# Patient Record
Sex: FEMALE | Race: WHITE | Employment: FULL TIME | URBAN - METROPOLITAN AREA
[De-identification: names, ages, dates, MRNs, and addresses within clinical notes are randomized per-mention and may not be internally consistent; named-entity substitution may affect disease eponyms.]

---

## 2017-01-10 ENCOUNTER — HOSPITAL ENCOUNTER (OUTPATIENT)
Dept: MRI IMAGING | Facility: HOSPITAL | Age: 47
Discharge: HOME | End: 2017-01-10
Attending: SURGERY
Payer: COMMERCIAL

## 2017-01-10 DIAGNOSIS — Z12.39 BREAST CANCER SCREENING, HIGH RISK PATIENT: ICD-10-CM

## 2017-01-10 DIAGNOSIS — Z91.89 AT HIGH RISK FOR BREAST CANCER: ICD-10-CM

## 2017-01-10 PROCEDURE — A9575 INJ GADOTERATE MEGLUMI 0.1ML: HCPCS | Performed by: SURGERY

## 2017-01-10 PROCEDURE — 77059 MRI BREAST (W+WO) W/CAD BILAT (CPT=77059/0159T): CPT

## 2017-01-10 PROCEDURE — 0159T MRI BREAST (W+WO) W/CAD BILAT (CPT=77059/0159T): CPT

## 2017-01-16 ENCOUNTER — TELEPHONE (OUTPATIENT)
Dept: SURGERY | Facility: CLINIC | Age: 47
End: 2017-01-16

## 2017-01-16 DIAGNOSIS — R92.8 ABNORMAL MRI, BREAST: Primary | ICD-10-CM

## 2017-01-20 ENCOUNTER — TELEPHONE (OUTPATIENT)
Dept: FAMILY MEDICINE CLINIC | Facility: CLINIC | Age: 47
End: 2017-01-20

## 2017-01-20 ENCOUNTER — TELEPHONE (OUTPATIENT)
Dept: SURGERY | Facility: CLINIC | Age: 47
End: 2017-01-20

## 2017-01-20 NOTE — TELEPHONE ENCOUNTER
Pt states that she gets shortness of breath when putting on her shoes. Per pt she has had gained weight and feels full. Pt states also at times it feels like she is being choked. Transferred call to [employer].

## 2017-01-20 NOTE — TELEPHONE ENCOUNTER
Pt states that she gained about 20 lbs within the last 2 weeks. She states that she has been eating better too. Denies swelling to extremities, constipation. She says her throat feels enlarged. She does get sob when putting her shoes on.  Pt declined SD ravinder

## 2017-01-23 ENCOUNTER — TELEPHONE (OUTPATIENT)
Dept: SURGERY | Facility: CLINIC | Age: 47
End: 2017-01-23

## 2017-01-23 ENCOUNTER — HOSPITAL ENCOUNTER (OUTPATIENT)
Dept: ULTRASOUND IMAGING | Facility: HOSPITAL | Age: 47
Discharge: HOME | End: 2017-01-23
Attending: SURGERY
Payer: COMMERCIAL

## 2017-01-23 DIAGNOSIS — R92.8 ABNORMAL MAMMOGRAM: ICD-10-CM

## 2017-01-23 PROCEDURE — 76642 ULTRASOUND BREAST LIMITED: CPT

## 2017-01-23 NOTE — TELEPHONE ENCOUNTER
Third attempt to reach pt.  [email or web address]    Need to notify pt of the following from Dr. Annalise Hussein: Please let the patient know that the MRI overall looks good but there is one area on the RIght that is hard to see so we need an US of the area to clarify moving for

## 2017-01-23 NOTE — TELEPHONE ENCOUNTER
Name & date of birth verified. Pt notified that MRI \"looks good,\" but area on the right \"hard to see. \" Pt states that she is in fact on the way to have us done at time of this call.  Pt verbalized understanding, and denies questions/concerns at this mauricio

## 2017-01-23 NOTE — TELEPHONE ENCOUNTER
Name & date of birth verified. Pt notified of benign ultrasound results, and plan of care is to return for follow-up w/ Dr. Castillo Rahman for clinical exam before next imaging is due in July 2017.  Pt verbalized understanding, and denies questions/concerns at this

## 2017-01-25 ENCOUNTER — OFFICE VISIT (OUTPATIENT)
Dept: FAMILY MEDICINE CLINIC | Facility: CLINIC | Age: 47
End: 2017-01-25

## 2017-01-25 VITALS
WEIGHT: 241 LBS | HEIGHT: 66.5 IN | BODY MASS INDEX: 38.28 KG/M2 | SYSTOLIC BLOOD PRESSURE: 129 MMHG | HEART RATE: 88 BPM | DIASTOLIC BLOOD PRESSURE: 81 MMHG | TEMPERATURE: 98 F

## 2017-01-25 DIAGNOSIS — F41.9 ANXIETY AND DEPRESSION: ICD-10-CM

## 2017-01-25 DIAGNOSIS — I73.00 RAYNAUD'S PHENOMENON WITHOUT GANGRENE: ICD-10-CM

## 2017-01-25 DIAGNOSIS — Z80.3 FH: BREAST CANCER: ICD-10-CM

## 2017-01-25 DIAGNOSIS — F32.A ANXIETY AND DEPRESSION: ICD-10-CM

## 2017-01-25 DIAGNOSIS — Z01.419 ENCOUNTER FOR GYNECOLOGICAL EXAMINATION: ICD-10-CM

## 2017-01-25 DIAGNOSIS — Z11.3 SCREENING FOR STD (SEXUALLY TRANSMITTED DISEASE): ICD-10-CM

## 2017-01-25 DIAGNOSIS — Z00.00 WELL ADULT EXAM: Primary | ICD-10-CM

## 2017-01-25 DIAGNOSIS — R42 VERTIGO: ICD-10-CM

## 2017-01-25 DIAGNOSIS — E66.9 OBESITY (BMI 30-39.9): ICD-10-CM

## 2017-01-25 DIAGNOSIS — E07.89 THYROID FULLNESS: ICD-10-CM

## 2017-01-25 PROCEDURE — 99396 PREV VISIT EST AGE 40-64: CPT | Performed by: FAMILY MEDICINE

## 2017-01-25 RX ORDER — ESCITALOPRAM OXALATE 20 MG/1
20 TABLET ORAL DAILY
Qty: 90 TABLET | Refills: 0 | Status: SHIPPED | OUTPATIENT
Start: 2017-01-25 | End: 2017-11-21

## 2017-01-25 RX ORDER — ESCITALOPRAM OXALATE 20 MG/1
TABLET ORAL
Qty: 90 TABLET | Refills: 0 | OUTPATIENT
Start: 2017-01-25

## 2017-01-25 RX ORDER — MECLIZINE HYDROCHLORIDE 25 MG/1
25 TABLET ORAL 3 TIMES DAILY PRN
Qty: 30 TABLET | Refills: 0 | Status: SHIPPED | OUTPATIENT
Start: 2017-01-25 | End: 2018-04-07

## 2017-01-25 NOTE — PROGRESS NOTES
HPI:   Helio Miller is a 40year old female who presents for a complete physical exam. Symptoms: periods are regular.    Vertigo back      Wt Readings from Last 6 Encounters:  01/25/17 : 241 lb (109.317 kg)  06/29/16 : 234 lb (106.142 kg)  11/07/15 : 221 l Smokeless Status: Never Used                        Alcohol Use: Yes                Comment: socially    Occ: . : n. Children: 2 daughters.       Immunization History   Administered Date(s) Administered   • Influenza Vaccine, No Preserv, 3YR + 11/10/ PLAN:   Tho Kent is a 40year old female who presents for a complete physical exam.  Pap and pelvic done. Self breast exam reviewed. Health maintenance, will check fasting Lipids, CMP, TSH and CBC.  Pt info handouts given for: exercise, low fat diet an

## 2017-01-25 NOTE — TELEPHONE ENCOUNTER
Pt was given script on 12/7/17 #90.  Pt will see  today  Refill Protocol Appointment Criteria  · Appointment scheduled in the past 6 months or in the next 3 months  Recent Visits       Provider Department Primary Dx    4 months ago Mayra Olsen MD E

## 2017-01-25 NOTE — PROGRESS NOTES
HPI:   Kerry Esquivel is a 40year old female who presents for a complete physical exam. Symptoms: {MPN GYNE ROS:29950}.      Wt Readings from Last 6 Encounters:  01/25/17 : 241 lb (109.317 kg)  06/29/16 : 234 lb (106.142 kg)  11/07/15 : 221 lb (100.245 kg) Never Used                        Alcohol Use: Yes                Comment: socially    Occ: ***. : ***. Children: ***.       Immunization History   Administered Date(s) Administered   • Influenza Vaccine, No Preserv, 3YR + 11/10/2014       Annual Phy is a 40year old female who presents for a complete physical exam. *** Pap and pelvic done. Self breast exam reviewed. Health maintenance, will check fasting Lipids, CMP, TSH and CBC.  Pt info handouts given for: exercise, low fat diet and breast self-exam.

## 2017-01-26 LAB
C TRACH DNA SPEC QL NAA+PROBE: NEGATIVE
N GONORRHOEA DNA SPEC QL NAA+PROBE: NEGATIVE

## 2017-01-27 LAB — LAST PAP RESULT: NORMAL

## 2017-01-28 ENCOUNTER — LAB ENCOUNTER (OUTPATIENT)
Dept: LAB | Age: 47
End: 2017-01-28
Attending: FAMILY MEDICINE
Payer: COMMERCIAL

## 2017-01-28 ENCOUNTER — HOSPITAL ENCOUNTER (OUTPATIENT)
Dept: ULTRASOUND IMAGING | Age: 47
Discharge: HOME | End: 2017-01-28
Attending: FAMILY MEDICINE
Payer: COMMERCIAL

## 2017-01-28 DIAGNOSIS — E07.89 THYROID FULLNESS: ICD-10-CM

## 2017-01-28 DIAGNOSIS — I73.00 RAYNAUD'S PHENOMENON WITHOUT GANGRENE: ICD-10-CM

## 2017-01-28 DIAGNOSIS — E66.9 OBESITY (BMI 30-39.9): ICD-10-CM

## 2017-01-28 DIAGNOSIS — Z00.00 WELL ADULT EXAM: ICD-10-CM

## 2017-01-28 LAB
ALT SERPL-CCNC: 16 U/L (ref 14–54)
ANION GAP SERPL CALC-SCNC: 7 MMOL/L (ref 0–18)
AST SERPL-CCNC: 15 U/L (ref 15–41)
BASOPHILS # BLD: 0.1 K/UL (ref 0–0.2)
BASOPHILS NFR BLD: 1 %
BUN SERPL-MCNC: 12 MG/DL (ref 8–20)
BUN/CREAT SERPL: 19.4 (ref 10–20)
CALCIUM SERPL-MCNC: 8.8 MG/DL (ref 8.5–10.5)
CHLORIDE SERPL-SCNC: 108 MMOL/L (ref 95–110)
CHOLEST SERPL-MCNC: 182 MG/DL (ref 110–200)
CO2 SERPL-SCNC: 24 MMOL/L (ref 22–32)
CREAT SERPL-MCNC: 0.62 MG/DL (ref 0.5–1.5)
EOSINOPHIL # BLD: 0.2 K/UL (ref 0–0.7)
EOSINOPHIL NFR BLD: 2 %
ERYTHROCYTE [DISTWIDTH] IN BLOOD BY AUTOMATED COUNT: 14.1 % (ref 11–15)
GLUCOSE SERPL-MCNC: 97 MG/DL (ref 70–99)
HCT VFR BLD AUTO: 39.4 % (ref 35–48)
HDLC SERPL-MCNC: 45 MG/DL
HGB BLD-MCNC: 13.2 G/DL (ref 12–16)
LDLC SERPL CALC-MCNC: 92 MG/DL (ref 0–99)
LYMPHOCYTES # BLD: 1.7 K/UL (ref 1–4)
LYMPHOCYTES NFR BLD: 19 %
MCH RBC QN AUTO: 30 PG (ref 27–32)
MCHC RBC AUTO-ENTMCNC: 33.5 G/DL (ref 32–37)
MCV RBC AUTO: 89.4 FL (ref 80–100)
MONOCYTES # BLD: 0.6 K/UL (ref 0–1)
MONOCYTES NFR BLD: 6 %
NEUTROPHILS # BLD AUTO: 6.4 K/UL (ref 1.8–7.7)
NEUTROPHILS NFR BLD: 71 %
NONHDLC SERPL-MCNC: 137 MG/DL
OSMOLALITY UR CALC.SUM OF ELEC: 288 MOSM/KG (ref 275–295)
PLATELET # BLD AUTO: 306 K/UL (ref 140–400)
PMV BLD AUTO: 7.9 FL (ref 7.4–10.3)
POTASSIUM SERPL-SCNC: 4.1 MMOL/L (ref 3.3–5.1)
RBC # BLD AUTO: 4.41 M/UL (ref 3.7–5.4)
SODIUM SERPL-SCNC: 139 MMOL/L (ref 136–144)
TRIGL SERPL-MCNC: 226 MG/DL (ref 1–149)
TSH SERPL-ACNC: 1.05 UIU/ML (ref 0.34–5.6)
VIT B12 SERPL-MCNC: 229 PG/ML (ref 181–914)
WBC # BLD AUTO: 8.9 K/UL (ref 4–11)

## 2017-01-28 PROCEDURE — 86038 ANTINUCLEAR ANTIBODIES: CPT

## 2017-01-28 PROCEDURE — 80048 BASIC METABOLIC PNL TOTAL CA: CPT

## 2017-01-28 PROCEDURE — 83036 HEMOGLOBIN GLYCOSYLATED A1C: CPT

## 2017-01-28 PROCEDURE — 76536 US EXAM OF HEAD AND NECK: CPT

## 2017-01-28 PROCEDURE — 82306 VITAMIN D 25 HYDROXY: CPT

## 2017-01-28 PROCEDURE — 80061 LIPID PANEL: CPT

## 2017-01-28 PROCEDURE — 84443 ASSAY THYROID STIM HORMONE: CPT

## 2017-01-28 PROCEDURE — 82607 VITAMIN B-12: CPT

## 2017-01-28 PROCEDURE — 36415 COLL VENOUS BLD VENIPUNCTURE: CPT

## 2017-01-28 PROCEDURE — 85025 COMPLETE CBC W/AUTO DIFF WBC: CPT

## 2017-01-28 PROCEDURE — 84450 TRANSFERASE (AST) (SGOT): CPT

## 2017-01-28 PROCEDURE — 84460 ALANINE AMINO (ALT) (SGPT): CPT

## 2017-01-29 LAB — HBA1C MFR BLD: 5 % (ref 4–6)

## 2017-01-30 LAB
25(OH)D3 SERPL-MCNC: 22.5 NG/ML
HPV I/H RISK 1 DNA SPEC QL NAA+PROBE: NEGATIVE

## 2017-01-31 LAB — ANA SER QL: NEGATIVE

## 2017-02-02 PROBLEM — E78.1 HYPERTRIGLYCERIDEMIA: Status: ACTIVE | Noted: 2017-02-02

## 2017-02-02 PROBLEM — E53.8 VITAMIN B12 DEFICIENCY: Status: ACTIVE | Noted: 2017-02-02

## 2017-02-02 PROBLEM — E55.9 VITAMIN D DEFICIENCY: Status: ACTIVE | Noted: 2017-02-02

## 2017-03-18 RX ORDER — ESCITALOPRAM OXALATE 20 MG/1
TABLET ORAL
Qty: 90 TABLET | Refills: 0 | Status: SHIPPED | OUTPATIENT
Start: 2017-03-18 | End: 2017-06-24

## 2017-03-18 NOTE — TELEPHONE ENCOUNTER
Pending Prescriptions Disp Refills    ESCITALOPRAM 20 MG Oral Tab [Pharmacy Med Name: ESCITALOPRAM 20 MG TABLET] 90 tablet 0     Sig: TAKE 1 TABLET BY MOUTH ONCE DAILY.            Refill Protocol Appointment Criteria  · Appointment scheduled in the past 6

## 2017-06-04 NOTE — TELEPHONE ENCOUNTER
rx needs to be phoned in if approved.   Last refilled on 9/8/16 #30 #1RF    ·   Recent Visits       Provider Department Primary Dx    4 months ago Niurka Morfin MD [facility] Sumeet OLIVARES Well adult exam    8 months ago Niurka Morfin MD E

## 2017-06-05 RX ORDER — ALPRAZOLAM 0.25 MG/1
TABLET ORAL
Qty: 30 TABLET | Refills: 0 | OUTPATIENT
Start: 2017-06-05 | End: 2017-08-30

## 2017-06-21 ENCOUNTER — HOSPITAL ENCOUNTER (OUTPATIENT)
Facility: HOSPITAL | Age: 47
Discharge: HOME | End: 2017-06-21
Attending: SURGERY
Payer: COMMERCIAL

## 2017-06-21 ENCOUNTER — OFFICE VISIT (OUTPATIENT)
Dept: SURGERY | Facility: CLINIC | Age: 47
End: 2017-06-21

## 2017-06-21 VITALS
BODY MASS INDEX: 39.8 KG/M2 | WEIGHT: 250.63 LBS | HEART RATE: 102 BPM | DIASTOLIC BLOOD PRESSURE: 79 MMHG | SYSTOLIC BLOOD PRESSURE: 133 MMHG | HEIGHT: 66.5 IN

## 2017-06-21 DIAGNOSIS — Z12.39 BREAST CANCER SCREENING, HIGH RISK PATIENT: Primary | ICD-10-CM

## 2017-06-21 DIAGNOSIS — Z91.89 AT HIGH RISK FOR BREAST CANCER: ICD-10-CM

## 2017-06-21 PROCEDURE — 99211 OFF/OP EST MAY X REQ PHY/QHP: CPT | Performed by: SURGERY

## 2017-06-21 PROCEDURE — 99214 OFFICE O/P EST MOD 30 MIN: CPT | Performed by: SURGERY

## 2017-06-27 RX ORDER — ESCITALOPRAM OXALATE 20 MG/1
TABLET ORAL
Qty: 90 TABLET | Refills: 0 | Status: SHIPPED | OUTPATIENT
Start: 2017-06-27 | End: 2017-11-21

## 2017-06-27 NOTE — TELEPHONE ENCOUNTER
Refill Protocol Appointment Criteria: Refilled per protocol    · Appointment scheduled in the past 6 months or in the next 3 months  Recent Outpatient Visits            6 days ago Breast cancer screening, high risk patient    [facility]

## 2017-07-06 ENCOUNTER — TELEPHONE (OUTPATIENT)
Dept: FAMILY MEDICINE CLINIC | Facility: CLINIC | Age: 47
End: 2017-07-06

## 2017-07-08 NOTE — TELEPHONE ENCOUNTER
Dr. Claudia Canas, please see below and advise. Patient was seen 1/25/17 for physical. No tdap administration on file.

## 2017-07-11 NOTE — TELEPHONE ENCOUNTER
Patient verbalized understanding of AMA message. Patient will check her work schedule and call our office to schedule.

## 2017-07-29 ENCOUNTER — HOSPITAL ENCOUNTER (OUTPATIENT)
Dept: MAMMOGRAPHY | Facility: HOSPITAL | Age: 47
Discharge: HOME | End: 2017-07-29
Attending: SURGERY
Payer: COMMERCIAL

## 2017-07-29 DIAGNOSIS — Z12.39 BREAST CANCER SCREENING, HIGH RISK PATIENT: ICD-10-CM

## 2017-08-05 NOTE — PROGRESS NOTES
Breast Surgery Follow-Up    History of Present Illness:    Ms. Robert Varner is a 40year old woman who presented last in February 2015 with multiple concerns related to her Left breast. She reports that several years ago, she developed sudden onset of Lef surveillance in light of the family history of breast cancer. Presently she has no new clinical concerns today. She had a mammogram performed on June 30, 2016 which was reported as benign.   She did undergo MRI on January 10, 2017 for high risk screening prior to visit.      Allergies:       Latex                   Rash    Family History    Problem  Relation  Age of Onset    •  Heart Disorder  Father      •  Diabetes  Father      •  Hypertension  Father      •  Breast Cancer  Paternal Grandmother  37        symptoms, vomiting, dark or bloody stools, constipation, yellowing of the skin, indigestion, nausea, change in bowel habits, diarrhea, abdominal pain or vomiting blood.      Genitourinary:  The patient denies frequent urination, needing to get up at night t trachea is in the midline. Conjunctiva are clear, non-icteric. Breasts:  Her breasts are asymmetrical (Left>Right) with a cup size 40D.   Right breast[de-identified] The skin, nipple ,and areola appear normal. There is no skin dimpling with movement of the pectoralis in the UOQ of the Left breast consistent with her previous imaging findings which has been stable over time. This has been biopsied in the past with benign pathology and has been stable and/or smaller on exam than previous.  On exam today I found no clinica today.  I anticipate that we will plan for MRI in January 2018 per high risk screening protocol. However, I will call her with the results of the mammogram when they are available to discuss the optimal surveillance plan.   She has been encouraged to conta

## 2017-09-01 NOTE — TELEPHONE ENCOUNTER
Rx needs to be phoned in if approved  Last refilled on 6-5-17 #30 0RF    Refill Protocol Appointment Criteria  · Appointment scheduled in the past 6 months or in the next 3 months  Recent Outpatient Visits            2 months ago Breast cancer screening, h

## 2017-09-02 RX ORDER — ALPRAZOLAM 0.25 MG/1
TABLET ORAL
Qty: 30 TABLET | Refills: 0 | OUTPATIENT
Start: 2017-09-02 | End: 2017-11-21

## 2017-09-23 ENCOUNTER — HOSPITAL ENCOUNTER (OUTPATIENT)
Dept: MAMMOGRAPHY | Facility: HOSPITAL | Age: 47
Discharge: HOME | End: 2017-09-23
Attending: SURGERY
Payer: COMMERCIAL

## 2017-09-23 PROCEDURE — 77063 BREAST TOMOSYNTHESIS BI: CPT | Performed by: SURGERY

## 2017-09-23 PROCEDURE — 77067 SCR MAMMO BI INCL CAD: CPT | Performed by: SURGERY

## 2017-09-26 DIAGNOSIS — Z12.39 BREAST CANCER SCREENING, HIGH RISK PATIENT: ICD-10-CM

## 2017-09-26 DIAGNOSIS — Z91.89 AT HIGH RISK FOR BREAST CANCER: Primary | ICD-10-CM

## 2017-11-21 ENCOUNTER — NURSE TRIAGE (OUTPATIENT)
Dept: OTHER | Age: 47
End: 2017-11-21

## 2017-11-21 ENCOUNTER — OFFICE VISIT (OUTPATIENT)
Dept: FAMILY MEDICINE CLINIC | Facility: CLINIC | Age: 47
End: 2017-11-21

## 2017-11-21 VITALS
DIASTOLIC BLOOD PRESSURE: 75 MMHG | TEMPERATURE: 99 F | HEIGHT: 66.5 IN | HEART RATE: 81 BPM | SYSTOLIC BLOOD PRESSURE: 113 MMHG

## 2017-11-21 DIAGNOSIS — F41.9 ANXIETY AND DEPRESSION: ICD-10-CM

## 2017-11-21 DIAGNOSIS — M62.838 MUSCLE SPASMS OF NECK: ICD-10-CM

## 2017-11-21 DIAGNOSIS — K52.9 GASTROENTERITIS: Primary | ICD-10-CM

## 2017-11-21 DIAGNOSIS — F32.A ANXIETY AND DEPRESSION: ICD-10-CM

## 2017-11-21 PROCEDURE — 99212 OFFICE O/P EST SF 10 MIN: CPT | Performed by: FAMILY MEDICINE

## 2017-11-21 PROCEDURE — 99214 OFFICE O/P EST MOD 30 MIN: CPT | Performed by: FAMILY MEDICINE

## 2017-11-21 RX ORDER — DIPHENOXYLATE HYDROCHLORIDE AND ATROPINE SULFATE 2.5; .025 MG/1; MG/1
1 TABLET ORAL 4 TIMES DAILY PRN
Qty: 20 TABLET | Refills: 0 | Status: SHIPPED | OUTPATIENT
Start: 2017-11-21 | End: 2017-12-02

## 2017-11-21 RX ORDER — CYCLOBENZAPRINE HCL 10 MG
10 TABLET ORAL 3 TIMES DAILY PRN
Qty: 30 TABLET | Refills: 0 | Status: SHIPPED | OUTPATIENT
Start: 2017-11-21 | End: 2018-04-07

## 2017-11-21 RX ORDER — ALPRAZOLAM 0.25 MG/1
0.25 TABLET ORAL 3 TIMES DAILY PRN
Qty: 30 TABLET | Refills: 0 | Status: SHIPPED | OUTPATIENT
Start: 2017-11-21 | End: 2018-04-07

## 2017-11-21 RX ORDER — DIPHENOXYLATE HYDROCHLORIDE AND ATROPINE SULFATE 2.5; .025 MG/1; MG/1
2 TABLET ORAL 4 TIMES DAILY PRN
Qty: 20 TABLET | Refills: 0 | Status: SHIPPED | OUTPATIENT
Start: 2017-11-21 | End: 2017-11-21

## 2017-11-21 NOTE — PROGRESS NOTES
Anxiety and depression  Now I can control my anger. Before I would get really upset. On the ecitralopram\"    C/o neck pain left sided  Woke up with it. Exam  Neck tender left side  Good rom no restirction  Pos muscle spasm to mid thoracic.     Dress wa

## 2017-11-21 NOTE — TELEPHONE ENCOUNTER
Action Requested: Summary for Provider     []  Critical Lab, Recommendations Needed  [x] Need Additional Advice  []   FYI    [x]   Need Orders  [x] Need Medications Sent to Pharmacy  []  Other     SUMMARY: Declines Md appointment, requesting any medication

## 2017-11-21 NOTE — TELEPHONE ENCOUNTER
She definitely should be seen. May need antibiotics.  To have diarrhea that many times and that long may not just be viral.

## 2017-12-02 ENCOUNTER — HOSPITAL ENCOUNTER (OUTPATIENT)
Dept: CT IMAGING | Age: 47
Discharge: HOME | End: 2017-12-02
Attending: FAMILY MEDICINE
Payer: COMMERCIAL

## 2017-12-02 ENCOUNTER — LAB ENCOUNTER (OUTPATIENT)
Dept: LAB | Age: 47
End: 2017-12-02
Attending: FAMILY MEDICINE
Payer: COMMERCIAL

## 2017-12-02 ENCOUNTER — OFFICE VISIT (OUTPATIENT)
Dept: FAMILY MEDICINE CLINIC | Facility: CLINIC | Age: 47
End: 2017-12-02

## 2017-12-02 VITALS
HEIGHT: 66.5 IN | BODY MASS INDEX: 39.7 KG/M2 | WEIGHT: 250 LBS | TEMPERATURE: 98 F | HEART RATE: 102 BPM | SYSTOLIC BLOOD PRESSURE: 107 MMHG | DIASTOLIC BLOOD PRESSURE: 75 MMHG

## 2017-12-02 DIAGNOSIS — R10.9 ABDOMINAL PAIN, UNSPECIFIED ABDOMINAL LOCATION: ICD-10-CM

## 2017-12-02 DIAGNOSIS — R10.10 PAIN OF UPPER ABDOMEN: ICD-10-CM

## 2017-12-02 DIAGNOSIS — R10.9 ABDOMINAL CRAMPS: ICD-10-CM

## 2017-12-02 DIAGNOSIS — R10.31 RIGHT LOWER QUADRANT PAIN: ICD-10-CM

## 2017-12-02 DIAGNOSIS — R10.9 ABDOMINAL PAIN, UNSPECIFIED ABDOMINAL LOCATION: Primary | ICD-10-CM

## 2017-12-02 DIAGNOSIS — R19.7 DIARRHEA, UNSPECIFIED TYPE: ICD-10-CM

## 2017-12-02 PROCEDURE — 83690 ASSAY OF LIPASE: CPT

## 2017-12-02 PROCEDURE — 74177 CT ABD & PELVIS W/CONTRAST: CPT | Performed by: FAMILY MEDICINE

## 2017-12-02 PROCEDURE — 99212 OFFICE O/P EST SF 10 MIN: CPT | Performed by: FAMILY MEDICINE

## 2017-12-02 PROCEDURE — 80053 COMPREHEN METABOLIC PANEL: CPT

## 2017-12-02 PROCEDURE — 82565 ASSAY OF CREATININE: CPT

## 2017-12-02 PROCEDURE — 85025 COMPLETE CBC W/AUTO DIFF WBC: CPT

## 2017-12-02 PROCEDURE — 36415 COLL VENOUS BLD VENIPUNCTURE: CPT

## 2017-12-02 PROCEDURE — 99214 OFFICE O/P EST MOD 30 MIN: CPT | Performed by: FAMILY MEDICINE

## 2017-12-02 PROCEDURE — 81000 URINALYSIS NONAUTO W/SCOPE: CPT | Performed by: FAMILY MEDICINE

## 2017-12-02 NOTE — PROGRESS NOTES
HPI:    Patient ID: Concetta Salter is a 56year old female. HPI     Patient with complains of abdominal pain that she has had for the last 2 weeks. Patient states she started with diarrhea about 2 weeks ago and would have abdominal cramps.   States sympt needed for Dizziness. Disp: 30 tablet Rfl: 0     Allergies:  Latex                   Rash   PHYSICAL EXAM:   Physical Exam   Constitutional: She appears well-developed and well-nourished.    Cardiovascular: Normal rate, regular rhythm and normal heart sound in this encounter    Imaging & Referrals:  CT ABDOMEN + PELVIS(CONTRAST ONLY) ([id number])       NR#[id number]

## 2018-01-02 ENCOUNTER — NURSE TRIAGE (OUTPATIENT)
Dept: OTHER | Age: 48
End: 2018-01-02

## 2018-01-02 NOTE — TELEPHONE ENCOUNTER
Action Requested: Summary for Provider     []  Critical Lab, Recommendations Needed  [] Need Additional Advice  []   FYI    []   Need Orders  [] Need Medications Sent to Pharmacy  []  Other     SUMMARY:  Pt is requesting antibiotics and steroids to pharmac

## 2018-01-02 NOTE — TELEPHONE ENCOUNTER
Advised patient of Dr. Rekha Kitchen note. Patient verbalized understanding. Offered patient last appointment available today, patient declined and will either [facility] or urgent care.

## 2018-04-07 ENCOUNTER — OFFICE VISIT (OUTPATIENT)
Dept: FAMILY MEDICINE CLINIC | Facility: CLINIC | Age: 48
End: 2018-04-07

## 2018-04-07 VITALS
SYSTOLIC BLOOD PRESSURE: 115 MMHG | BODY MASS INDEX: 39.54 KG/M2 | TEMPERATURE: 99 F | HEART RATE: 86 BPM | HEIGHT: 66.5 IN | WEIGHT: 249 LBS | DIASTOLIC BLOOD PRESSURE: 82 MMHG

## 2018-04-07 DIAGNOSIS — Z80.3 FH: BREAST CANCER IN FIRST DEGREE RELATIVE: ICD-10-CM

## 2018-04-07 DIAGNOSIS — F41.9 ANXIETY AND DEPRESSION: ICD-10-CM

## 2018-04-07 DIAGNOSIS — E66.9 OBESITY (BMI 30-39.9): ICD-10-CM

## 2018-04-07 DIAGNOSIS — D22.5 NEVUS OF BUTTOCK: Primary | ICD-10-CM

## 2018-04-07 DIAGNOSIS — F32.A ANXIETY AND DEPRESSION: ICD-10-CM

## 2018-04-07 DIAGNOSIS — M72.2 PLANTAR FASCIITIS, BILATERAL: ICD-10-CM

## 2018-04-07 DIAGNOSIS — Z00.00 WELL ADULT EXAM: ICD-10-CM

## 2018-04-07 PROCEDURE — 99212 OFFICE O/P EST SF 10 MIN: CPT | Performed by: FAMILY MEDICINE

## 2018-04-07 PROCEDURE — 99214 OFFICE O/P EST MOD 30 MIN: CPT | Performed by: FAMILY MEDICINE

## 2018-04-07 RX ORDER — ALPRAZOLAM 0.25 MG/1
0.25 TABLET ORAL NIGHTLY PRN
Qty: 30 TABLET | Refills: 3 | Status: SHIPPED
Start: 2018-04-07 | End: 2018-10-12

## 2018-04-07 NOTE — PROGRESS NOTES
HPI:    Patient ID: Rojelio Courser is a 56year old female. HPI     Patient presents with: Follow - Up: mole  Musculoskeletal Problem: X 6 months     Patient here with complains of feeling like a bump in between her buttocks.   She states in the past she and normal heart sounds. Pulmonary/Chest: Effort normal and breath sounds normal.   Musculoskeletal: She exhibits no edema, tenderness or deformity. Skin: Rash noted. Examined into her gluteal area. He does have a scar from a prior biopsy.   No mole Encounter      ALT(SGPT) [E]      AST (SGOT) [E]      Basic Metabolic Panel (8) [E]      CBC W Differential W Platelet [E]      Lipid Panel [E]      TSH W Reflex To Free T4 [E]      Vitamin D, 25-Hydroxy [E]    Meds This Visit:  Signed Prescriptions Disp R

## 2018-04-24 ENCOUNTER — NURSE TRIAGE (OUTPATIENT)
Dept: OTHER | Age: 48
End: 2018-04-24

## 2018-04-24 NOTE — TELEPHONE ENCOUNTER
Spoke with patient and informed of AMA message. Patient is interested in trying the Sertraline. Advised of AMA directions and to call back for any questions or concerns. Patient verbalized understanding and agreement. Rx sent as directed.

## 2018-04-24 NOTE — TELEPHONE ENCOUNTER
Can try sertraline if ok with patient  Start sertraline 50mg daily, disp 60, increase to 2 tabs daily if no improvement in 2 weeks  Follow up 3 weeks

## 2018-04-24 NOTE — TELEPHONE ENCOUNTER
Action Requested: Summary for Provider     []  Critical Lab, Recommendations Needed  [x] Need Additional Advice  []   FYI    []   Need Orders  [x] Need Medications Sent to Pharmacy  []  Other     SUMMARY:,Dr. Lyndsey Shelton, patient called and asked if you would cons

## 2018-05-21 NOTE — TELEPHONE ENCOUNTER
Pt called in to schedule her f/u appt (6/2). Pt also states that she had to increase her dosage to 100MG, per directions, and is now close to being out of medication. Pt requesting refill.

## 2018-05-22 NOTE — TELEPHONE ENCOUNTER
PLEASE ADVISE ON REFILL REQUEST    Refill Protocol Appointment Criteria  · Appointment scheduled in the past 6 months or in the next 3 months  Recent Outpatient Visits            1 month ago Dayo guevara [facility], [facility], [address], [address]

## 2018-06-30 ENCOUNTER — LAB ENCOUNTER (OUTPATIENT)
Dept: LAB | Age: 48
End: 2018-06-30
Attending: FAMILY MEDICINE
Payer: COMMERCIAL

## 2018-06-30 ENCOUNTER — APPOINTMENT (OUTPATIENT)
Dept: LAB | Age: 48
End: 2018-06-30
Attending: FAMILY MEDICINE
Payer: COMMERCIAL

## 2018-06-30 DIAGNOSIS — Z00.00 WELL ADULT EXAM: ICD-10-CM

## 2018-06-30 DIAGNOSIS — R42 DIZZINESS: ICD-10-CM

## 2018-06-30 DIAGNOSIS — R53.83 FATIGUE, UNSPECIFIED TYPE: ICD-10-CM

## 2018-06-30 LAB
ALT SERPL-CCNC: 21 U/L (ref 14–54)
ANION GAP SERPL CALC-SCNC: 6 MMOL/L (ref 0–18)
AST SERPL-CCNC: 19 U/L (ref 15–41)
BASOPHILS # BLD: 0.1 K/UL (ref 0–0.2)
BASOPHILS NFR BLD: 1 %
BUN SERPL-MCNC: 9 MG/DL (ref 8–20)
BUN/CREAT SERPL: 17.3 (ref 10–20)
CALCIUM SERPL-MCNC: 8.7 MG/DL (ref 8.5–10.5)
CHLORIDE SERPL-SCNC: 107 MMOL/L (ref 95–110)
CHOLEST SERPL-MCNC: 202 MG/DL (ref 110–200)
CO2 SERPL-SCNC: 23 MMOL/L (ref 22–32)
CREAT SERPL-MCNC: 0.52 MG/DL (ref 0.5–1.5)
EOSINOPHIL # BLD: 0.2 K/UL (ref 0–0.7)
EOSINOPHIL NFR BLD: 2 %
ERYTHROCYTE [DISTWIDTH] IN BLOOD BY AUTOMATED COUNT: 14.6 % (ref 11–15)
GLUCOSE SERPL-MCNC: 89 MG/DL (ref 70–99)
HCT VFR BLD AUTO: 39.7 % (ref 35–48)
HDLC SERPL-MCNC: 45 MG/DL
HGB BLD-MCNC: 13.2 G/DL (ref 12–16)
LDLC SERPL CALC-MCNC: 113 MG/DL (ref 0–99)
LYMPHOCYTES # BLD: 2.2 K/UL (ref 1–4)
LYMPHOCYTES NFR BLD: 22 %
MCH RBC QN AUTO: 28.6 PG (ref 27–32)
MCHC RBC AUTO-ENTMCNC: 33.4 G/DL (ref 32–37)
MCV RBC AUTO: 85.5 FL (ref 80–100)
MONOCYTES # BLD: 0.7 K/UL (ref 0–1)
MONOCYTES NFR BLD: 7 %
NEUTROPHILS # BLD AUTO: 6.8 K/UL (ref 1.8–7.7)
NEUTROPHILS NFR BLD: 68 %
NONHDLC SERPL-MCNC: 157 MG/DL
OSMOLALITY UR CALC.SUM OF ELEC: 280 MOSM/KG (ref 275–295)
PLATELET # BLD AUTO: 358 K/UL (ref 140–400)
PMV BLD AUTO: 7.7 FL (ref 7.4–10.3)
POTASSIUM SERPL-SCNC: 4.3 MMOL/L (ref 3.3–5.1)
RBC # BLD AUTO: 4.64 M/UL (ref 3.7–5.4)
SODIUM SERPL-SCNC: 136 MMOL/L (ref 136–144)
TRIGL SERPL-MCNC: 221 MG/DL (ref 1–149)
TSH SERPL-ACNC: 1.01 UIU/ML (ref 0.45–5.33)
VIT B12 SERPL-MCNC: 287 PG/ML (ref 181–914)
WBC # BLD AUTO: 10 K/UL (ref 4–11)

## 2018-06-30 PROCEDURE — 93005 ELECTROCARDIOGRAM TRACING: CPT

## 2018-06-30 PROCEDURE — 80061 LIPID PANEL: CPT

## 2018-06-30 PROCEDURE — 84450 TRANSFERASE (AST) (SGOT): CPT

## 2018-06-30 PROCEDURE — 84460 ALANINE AMINO (ALT) (SGPT): CPT

## 2018-06-30 PROCEDURE — 82306 VITAMIN D 25 HYDROXY: CPT

## 2018-06-30 PROCEDURE — 84443 ASSAY THYROID STIM HORMONE: CPT

## 2018-06-30 PROCEDURE — 36415 COLL VENOUS BLD VENIPUNCTURE: CPT

## 2018-06-30 PROCEDURE — 93010 ELECTROCARDIOGRAM REPORT: CPT | Performed by: FAMILY MEDICINE

## 2018-06-30 PROCEDURE — 85025 COMPLETE CBC W/AUTO DIFF WBC: CPT

## 2018-06-30 PROCEDURE — 80048 BASIC METABOLIC PNL TOTAL CA: CPT

## 2018-06-30 PROCEDURE — 82607 VITAMIN B-12: CPT

## 2018-06-30 NOTE — PROGRESS NOTES
HPI:    Patient ID: Taylor Tello is a 56year old female. HPI   Patient presents with: Follow - Up: anxiety and antidepression   Vertigo: X 1 week     feeling mood better. Taking sertraline 100 mg daily. Has hx of dizziness.   Feels her vertigo is ba work as well as EKG done today. Follow-up in 2 weeks for complete physical.  Patient has tried Antivert in the past for her vertigo but did not work. Encourage fluid intake and slow movements  If worsening call or follow-up or go to emergency room.

## 2018-07-02 LAB — 25(OH)D3 SERPL-MCNC: 22.7 NG/ML

## 2018-07-03 ENCOUNTER — NURSE TRIAGE (OUTPATIENT)
Dept: OTHER | Age: 48
End: 2018-07-03

## 2018-07-03 RX ORDER — CYCLOBENZAPRINE HCL 10 MG
10 TABLET ORAL 3 TIMES DAILY PRN
Qty: 30 TABLET | Refills: 0 | Status: SHIPPED | OUTPATIENT
Start: 2018-07-03 | End: 2018-12-18

## 2018-07-03 NOTE — TELEPHONE ENCOUNTER
Muscle relaxer sent in. Caution sedation with the medication. If symptoms do not improve recommend follow-up.

## 2018-07-03 NOTE — TELEPHONE ENCOUNTER
Spoke with patient and informed her of Dr. Mckenzie Rivers orders and plan of care and not to operate heavy machinery after taking the new medication. Patient voiced understanding and agreed with the plan of care.

## 2018-07-03 NOTE — TELEPHONE ENCOUNTER
Action Requested: Summary for Provider     []  Critical Lab, Recommendations Needed  [] Need Additional Advice  []   FYI    []   Need Orders  [] Need Medications Sent to Pharmacy  []  Other     SUMMARY:  Pt stated that she was in to see you on 6/30

## 2018-09-01 NOTE — TELEPHONE ENCOUNTER
To reception staff, pls call pt for f/u appt.             Pending Prescriptions Disp Refills    SERTRALINE HCL 50 MG Oral Tab [Pharmacy Med Name: SERTRALINE HCL 50 MG TABLET] 180 tablet 0     Sig: TAKE ONE TABLET BY MOUTH DAILY AND INCREASE TO TWO TABLETS D

## 2018-09-05 ENCOUNTER — TELEPHONE (OUTPATIENT)
Dept: FAMILY MEDICINE CLINIC | Facility: CLINIC | Age: 48
End: 2018-09-05

## 2018-09-05 NOTE — TELEPHONE ENCOUNTER
Medication Detail      Disp Refills Start End    Sertraline HCl 100 MG Oral Tab 90 tablet 1 6/30/2018     Sig - Route:  Take 1 tablet (100 mg total) by mouth daily. - Oral    E-Prescribing Status: Receipt confirmed by pharmacy (6/30/2018 10:55 AM CDT)

## 2018-09-05 NOTE — TELEPHONE ENCOUNTER
Current Outpatient Prescriptions:   •  Sertraline HCl 100 MG Oral Tab, Take 1 tablet (100 mg total) by mouth daily. , Disp: 90 tablet, Rfl: 1  •

## 2018-09-13 ENCOUNTER — OFFICE VISIT (OUTPATIENT)
Dept: FAMILY MEDICINE CLINIC | Facility: CLINIC | Age: 48
End: 2018-09-13
Payer: COMMERCIAL

## 2018-09-13 VITALS
DIASTOLIC BLOOD PRESSURE: 74 MMHG | BODY MASS INDEX: 39.86 KG/M2 | HEART RATE: 78 BPM | HEIGHT: 66.5 IN | WEIGHT: 251 LBS | SYSTOLIC BLOOD PRESSURE: 118 MMHG | TEMPERATURE: 98 F

## 2018-09-13 DIAGNOSIS — E53.8 VITAMIN B12 DEFICIENCY: ICD-10-CM

## 2018-09-13 DIAGNOSIS — Z01.419 ENCOUNTER FOR GYNECOLOGICAL EXAMINATION: ICD-10-CM

## 2018-09-13 DIAGNOSIS — Z00.00 WELL ADULT EXAM: Primary | ICD-10-CM

## 2018-09-13 DIAGNOSIS — E55.9 VITAMIN D DEFICIENCY: ICD-10-CM

## 2018-09-13 DIAGNOSIS — F32.A ANXIETY AND DEPRESSION: ICD-10-CM

## 2018-09-13 DIAGNOSIS — Z80.3 FH: BREAST CANCER: ICD-10-CM

## 2018-09-13 DIAGNOSIS — E66.9 OBESITY (BMI 30-39.9): ICD-10-CM

## 2018-09-13 DIAGNOSIS — F41.9 ANXIETY AND DEPRESSION: ICD-10-CM

## 2018-09-13 PROCEDURE — 90471 IMMUNIZATION ADMIN: CPT | Performed by: FAMILY MEDICINE

## 2018-09-13 PROCEDURE — 99396 PREV VISIT EST AGE 40-64: CPT | Performed by: FAMILY MEDICINE

## 2018-09-13 PROCEDURE — 90715 TDAP VACCINE 7 YRS/> IM: CPT | Performed by: FAMILY MEDICINE

## 2018-09-13 NOTE — PROGRESS NOTES
HPI:   Tushar Darling is a 56year old female who presents for a complete physical exam. Symptoms: periods are regular. C/o weight gain  Feels she is eating  But no time to exercise.   Feels tired    Wt Readings from Last 6 Encounters:  09/13/18 : 251 lb Former Smoker        Packs/day: 0.50        Years: 10.00        Pack years: 5        Types: Cigarettes      Smokeless tobacco: Never Used    Alcohol use: Yes      Comment: socially    Drug use: No      Comment: never    Occ: . : n. Children: 2. edema  NEURO: Oriented times three,cranial nerves are intact,motor and sensory are grossly intact    ASSESSMENT AND PLAN:   Rojelio Courser is a 56year old female who presents for a complete physical exam. Self breast exam reviewed.  Health maintenance, will

## 2018-09-18 ENCOUNTER — HOSPITAL ENCOUNTER (OUTPATIENT)
Dept: MRI IMAGING | Age: 48
Discharge: HOME | End: 2018-09-18
Attending: SURGERY
Payer: COMMERCIAL

## 2018-09-18 DIAGNOSIS — Z12.39 BREAST CANCER SCREENING, HIGH RISK PATIENT: ICD-10-CM

## 2018-09-18 DIAGNOSIS — Z91.89 AT HIGH RISK FOR BREAST CANCER: ICD-10-CM

## 2018-09-18 PROCEDURE — A9575 INJ GADOTERATE MEGLUMI 0.1ML: HCPCS | Performed by: SURGERY

## 2018-09-18 PROCEDURE — 0159T MRI BREAST (W+WO) W/CAD BILAT (CPT=77059/0159T): CPT | Performed by: SURGERY

## 2018-09-18 PROCEDURE — 77059 MRI BREAST (W+WO) W/CAD BILAT (CPT=77059/0159T): CPT | Performed by: SURGERY

## 2018-09-20 DIAGNOSIS — R92.8 ABNORMAL MRI, BREAST: Primary | ICD-10-CM

## 2018-09-21 ENCOUNTER — TELEPHONE (OUTPATIENT)
Dept: SURGERY | Facility: CLINIC | Age: 48
End: 2018-09-21

## 2018-09-21 NOTE — TELEPHONE ENCOUNTER
Reviewed Dr Amy Maher message regarding MRI findings, and recommendation for additional imaging, mammogram and US. Pt verbalized understanding, and will call to schedule.

## 2018-10-03 ENCOUNTER — HOSPITAL ENCOUNTER (OUTPATIENT)
Dept: MAMMOGRAPHY | Facility: HOSPITAL | Age: 48
Discharge: HOME | End: 2018-10-03
Attending: SURGERY
Payer: COMMERCIAL

## 2018-10-03 ENCOUNTER — HOSPITAL ENCOUNTER (OUTPATIENT)
Dept: ULTRASOUND IMAGING | Facility: HOSPITAL | Age: 48
Discharge: HOME | End: 2018-10-03
Attending: SURGERY
Payer: COMMERCIAL

## 2018-10-03 DIAGNOSIS — R92.8 ABNORMAL MRI, BREAST: Primary | ICD-10-CM

## 2018-10-03 DIAGNOSIS — R92.8 ABNORMAL MRI, BREAST: ICD-10-CM

## 2018-10-03 PROCEDURE — 77066 DX MAMMO INCL CAD BI: CPT | Performed by: SURGERY

## 2018-10-03 PROCEDURE — 77062 BREAST TOMOSYNTHESIS BI: CPT | Performed by: SURGERY

## 2018-10-03 PROCEDURE — 76642 ULTRASOUND BREAST LIMITED: CPT | Performed by: SURGERY

## 2018-10-03 NOTE — IMAGING NOTE
Discussed recommended breast biopsy with patient. Ms Marques Lowry was recommended for bilateral breast mri biopsy by Dr Scar Hickey  Reviewed pertinent patient history, family history of cancer, and patient medications.   Patient reports taking the following medication local numbing medication into the area and then use a needle to collect cells from the site, which will be sent to pathology. A marker, or clip, will then be placed in the biopsied area.   This marker is placed so the biopsy site is able to be accurately l themselves home if they wish. Educated patient that some soreness may occur after biopsy. Discussed use of a supportive bra and ice packs after procedure, to decrease soreness.     Discussed with patient no swimming, bathing, or submerging underwater  u

## 2018-10-04 ENCOUNTER — OFFICE VISIT (OUTPATIENT)
Dept: SURGERY | Facility: CLINIC | Age: 48
End: 2018-10-04
Payer: COMMERCIAL

## 2018-10-04 VITALS
HEIGHT: 66.5 IN | DIASTOLIC BLOOD PRESSURE: 90 MMHG | HEART RATE: 87 BPM | WEIGHT: 254.06 LBS | SYSTOLIC BLOOD PRESSURE: 140 MMHG | BODY MASS INDEX: 40.35 KG/M2 | OXYGEN SATURATION: 96 % | RESPIRATION RATE: 18 BRPM

## 2018-10-04 DIAGNOSIS — R53.82 CHRONIC FATIGUE: ICD-10-CM

## 2018-10-04 DIAGNOSIS — E66.01 MORBID OBESITY WITH BMI OF 40.0-44.9, ADULT (HCC): ICD-10-CM

## 2018-10-04 DIAGNOSIS — F32.9 REACTIVE DEPRESSION: ICD-10-CM

## 2018-10-04 DIAGNOSIS — E78.2 HYPERCHOLESTEROLEMIA WITH HYPERTRIGLYCERIDEMIA: Primary | ICD-10-CM

## 2018-10-04 DIAGNOSIS — R60.0 LOWER EXTREMITY EDEMA: ICD-10-CM

## 2018-10-04 DIAGNOSIS — E53.8 VITAMIN B12 DEFICIENCY: ICD-10-CM

## 2018-10-04 DIAGNOSIS — E55.9 VITAMIN D DEFICIENCY: ICD-10-CM

## 2018-10-04 DIAGNOSIS — R63.2 BINGE EATING: ICD-10-CM

## 2018-10-04 PROCEDURE — 99244 OFF/OP CNSLTJ NEW/EST MOD 40: CPT | Performed by: INTERNAL MEDICINE

## 2018-10-04 RX ORDER — HYDROCHLOROTHIAZIDE 12.5 MG/1
12.5 CAPSULE, GELATIN COATED ORAL DAILY
Qty: 30 CAPSULE | Refills: 1 | Status: SHIPPED | OUTPATIENT
Start: 2018-10-04 | End: 2018-11-01

## 2018-10-04 NOTE — PROGRESS NOTES
[facility] Consultation Note       Date of Consult:  10/4/2018    Patient:  Sammie Fox  :      1970  MRN:      [id number]    Referring Provider: Dr. Ileana Armendariz       Chief Complaint:  Patient presents with:   Follow - Up: non s History    Socioeconomic History      Marital status: Single      Spouse name: Not on file      Number of children: Not on file      Years of education: Not on file      Highest education level: Not on file    Social Needs      Financial resource strain: N Dinner   [employer], diet, pb sandwich, ham sandwich  sweet Chips, [place].  Dew, lean cuisine, salads  FF, pizza, chips   +sweet tooth  +salty  +eats quickly  +guilty  +etoh    Soda Drinker?: Yes  If yes, how much?:  diet    Number of restaurant or fast food neetu her current medication.     Lab Results   Component Value Date/Time    CHOLEST 202 (H) 06/30/2018 11:12 AM     (H) 06/30/2018 11:12 AM    HDL 45 06/30/2018 11:12 AM    TRIG 221 (H) 06/30/2018 11:12 AM         Encounter Diagnosis(ses):   Hypercholeste

## 2018-10-08 ENCOUNTER — HOSPITAL ENCOUNTER (OUTPATIENT)
Dept: MRI IMAGING | Facility: HOSPITAL | Age: 48
Discharge: HOME | End: 2018-10-08
Attending: SURGERY
Payer: COMMERCIAL

## 2018-10-08 ENCOUNTER — HOSPITAL ENCOUNTER (OUTPATIENT)
Dept: MAMMOGRAPHY | Facility: HOSPITAL | Age: 48
Discharge: HOME | End: 2018-10-08
Attending: SURGERY
Payer: COMMERCIAL

## 2018-10-08 VITALS
HEART RATE: 82 BPM | SYSTOLIC BLOOD PRESSURE: 129 MMHG | DIASTOLIC BLOOD PRESSURE: 91 MMHG | OXYGEN SATURATION: 98 % | RESPIRATION RATE: 16 BRPM

## 2018-10-08 DIAGNOSIS — R92.8 ABNORMAL MRI, BREAST: ICD-10-CM

## 2018-10-08 PROCEDURE — 88305 TISSUE EXAM BY PATHOLOGIST: CPT | Performed by: SURGERY

## 2018-10-08 PROCEDURE — A9575 INJ GADOTERATE MEGLUMI 0.1ML: HCPCS | Performed by: SURGERY

## 2018-10-08 PROCEDURE — 77066 DX MAMMO INCL CAD BI: CPT | Performed by: SURGERY

## 2018-10-08 PROCEDURE — 19085 BX BREAST 1ST LESION MR IMAG: CPT | Performed by: SURGERY

## 2018-10-08 PROCEDURE — 88360 TUMOR IMMUNOHISTOCHEM/MANUAL: CPT | Performed by: SURGERY

## 2018-10-08 PROCEDURE — 88341 IMHCHEM/IMCYTCHM EA ADD ANTB: CPT | Performed by: SURGERY

## 2018-10-08 PROCEDURE — 88342 IMHCHEM/IMCYTCHM 1ST ANTB: CPT | Performed by: SURGERY

## 2018-10-08 PROCEDURE — 19086 BX BREAST ADD LESION MR IMAG: CPT | Performed by: SURGERY

## 2018-10-08 RX ORDER — LIDOCAINE HYDROCHLORIDE 10 MG/ML
INJECTION, SOLUTION EPIDURAL; INFILTRATION; INTRACAUDAL; PERINEURAL
Status: COMPLETED
Start: 2018-10-08 | End: 2018-10-08

## 2018-10-08 RX ORDER — LIDOCAINE HYDROCHLORIDE 10 MG/ML
1 INJECTION, SOLUTION EPIDURAL; INFILTRATION; INTRACAUDAL; PERINEURAL ONCE
Status: COMPLETED | OUTPATIENT
Start: 2018-10-08 | End: 2018-10-08

## 2018-10-08 RX ORDER — SODIUM CHLORIDE 9 MG/ML
INJECTION, SOLUTION INTRAVENOUS
Status: DISPENSED
Start: 2018-10-08 | End: 2018-10-09

## 2018-10-08 RX ORDER — LIDOCAINE HYDROCHLORIDE AND EPINEPHRINE 10; 10 MG/ML; UG/ML
INJECTION, SOLUTION INFILTRATION; PERINEURAL
Status: DISPENSED
Start: 2018-10-08 | End: 2018-10-09

## 2018-10-08 RX ORDER — LIDOCAINE HYDROCHLORIDE AND EPINEPHRINE 10; 10 MG/ML; UG/ML
10 INJECTION, SOLUTION INFILTRATION; PERINEURAL ONCE
Status: COMPLETED | OUTPATIENT
Start: 2018-10-08 | End: 2018-10-08

## 2018-10-08 NOTE — IMAGING NOTE
1300: Patient to Radiology holding area. IV started by MRI Tech Pawan,Rt Antecubital.    1346: Consent verified and obtained. 1410:   here procedure explained questions answered .     1412: Sites marked, Lateral Right and Lateral Left  Breas

## 2018-10-11 ENCOUNTER — TELEPHONE (OUTPATIENT)
Dept: SURGERY | Facility: CLINIC | Age: 48
End: 2018-10-11

## 2018-10-11 ENCOUNTER — OFFICE VISIT (OUTPATIENT)
Dept: SURGERY | Facility: CLINIC | Age: 48
End: 2018-10-11
Payer: COMMERCIAL

## 2018-10-11 VITALS
DIASTOLIC BLOOD PRESSURE: 86 MMHG | HEART RATE: 92 BPM | BODY MASS INDEX: 40 KG/M2 | TEMPERATURE: 99 F | SYSTOLIC BLOOD PRESSURE: 153 MMHG | RESPIRATION RATE: 20 BRPM | WEIGHT: 254 LBS

## 2018-10-11 DIAGNOSIS — D05.12 NEOPLASM OF LEFT BREAST, PRIMARY TUMOR STAGING CATEGORY TIS: DUCTAL CARCINOMA IN SITU (DCIS): Primary | ICD-10-CM

## 2018-10-11 PROCEDURE — 99215 OFFICE O/P EST HI 40 MIN: CPT | Performed by: SURGERY

## 2018-10-11 RX ORDER — CHOLECALCIFEROL (VITAMIN D3) 125 MCG
CAPSULE ORAL
COMMUNITY
End: 2019-01-05

## 2018-10-11 NOTE — TELEPHONE ENCOUNTER
LIA for patient regarding pathology results. Told patient that she needs to call back to discuss further management. Also conveyed that we would potentially be able to see her this afternoon in the [facility] office to discuss this in person.

## 2018-10-11 NOTE — PROGRESS NOTES
Breast Surgery Follow-Up    History of Present Illness:    Ms. Isra Pacheco is a 40year old woman who presented last in June 2017 with multiple concerns related to her Left breast. She reports that several years ago, she developed sudden onset of Left br in light of the family history of breast cancer. Presently she has no new clinical concerns today. She had a mammogram performed on June 30, 2016 which was reported as benign.   She did undergo MRI on January 10, 2017 for high risk screening protocol [facility])    • Vitamin D deficiency      Past Surgical History:   Procedure Laterality Date   •      • MARISOL BIOPSY STEREOTACTIC NODULE 2 SITE BILAT      negative   • TONSILLECTOMY     Left Breast Stereotactic biopsy in 3/2014-Benign    Gynecologica [facility] from [place] in July 2013 with her 2 daughters. She is  and was living in [place] previously.      Review of Systems:  General:    The patient denies, fever, chills, night sweats, fatigue, generalized weakness, change in appetite pain, stiff joints, neck pain, back pain or bone pain.     Neuropsychiatric:  There is no history of migraines or severe headaches, seizure/epilepsy, speech problems, coordination problems, trembling/tremors, fainting/black outs, dizziness, memory problems, breast but with obvious palpable dense tissue that is asymmetrical compared to the contralateral breast. The axillary tail is normal.    Abdomen:  The abdomen is soft, flat and non tender. The liver is not enlarged. There are no palpable masses.     Lymph N explained. I explained that for pure DCIS, systemic therapy is not required, although endocrine agents such as tamoxifen can be used in women with hormone sensitive disease in order to reduce the risk of local recurrence and future new  Primaries.     We re

## 2018-10-12 NOTE — TELEPHONE ENCOUNTER
Controlled medication pending for review. If approved needs to be called in or faxed by on-site staff.     Last Rx: 04/07/18  Patient is also requesting for Dr. Claudio Palma to take a look at the recent pathology results since that is the reason why she needs the

## 2018-10-14 RX ORDER — ALPRAZOLAM 0.25 MG/1
0.25 TABLET ORAL NIGHTLY PRN
Qty: 30 TABLET | Refills: 0 | OUTPATIENT
Start: 2018-10-14 | End: 2018-12-14

## 2018-10-20 RX ORDER — ALPRAZOLAM 0.25 MG/1
TABLET ORAL
Qty: 30 TABLET | OUTPATIENT
Start: 2018-10-20

## 2018-10-22 ENCOUNTER — TELEPHONE (OUTPATIENT)
Dept: HEMATOLOGY/ONCOLOGY | Facility: HOSPITAL | Age: 48
End: 2018-10-22

## 2018-10-22 DIAGNOSIS — D05.12 NEOPLASM OF LEFT BREAST, PRIMARY TUMOR STAGING CATEGORY TIS: DUCTAL CARCINOMA IN SITU (DCIS): Primary | ICD-10-CM

## 2018-10-22 DIAGNOSIS — D05.12 DUCTAL CARCINOMA IN SITU (DCIS) OF LEFT BREAST: Primary | ICD-10-CM

## 2018-10-22 NOTE — TELEPHONE ENCOUNTER
Phoned patient for care coordination and reinforcement of preoperative education. Patient is scheduled for a wire localized lumpectomy of her left breast on 10/29/18.   Reinforced to patient rationale for bracketed wire localization, as well as what to e

## 2018-10-24 RX ORDER — MULTIVITAMIN
1 CAPSULE ORAL DAILY
COMMUNITY

## 2018-10-25 ENCOUNTER — TELEPHONE (OUTPATIENT)
Dept: SURGERY | Facility: CLINIC | Age: 48
End: 2018-10-25

## 2018-10-25 NOTE — TELEPHONE ENCOUNTER
Pt phoned in to report that is is getting over a URI infection, but still has a productive cough.  The phlegm is now more clear/white, getting over the infection, but is worried about the MAC anesthesia if she has to cough during the procedure scheduled on

## 2018-10-29 ENCOUNTER — HOSPITAL ENCOUNTER (OUTPATIENT)
Facility: HOSPITAL | Age: 48
Setting detail: HOSPITAL OUTPATIENT SURGERY
Discharge: HOME | End: 2018-10-29
Attending: SURGERY | Admitting: SURGERY
Payer: COMMERCIAL

## 2018-10-29 ENCOUNTER — HOSPITAL ENCOUNTER (OUTPATIENT)
Dept: MAMMOGRAPHY | Facility: HOSPITAL | Age: 48
Discharge: HOME | End: 2018-10-29
Attending: SURGERY
Payer: COMMERCIAL

## 2018-10-29 ENCOUNTER — ANESTHESIA EVENT (OUTPATIENT)
Dept: SURGERY | Facility: HOSPITAL | Age: 48
End: 2018-10-29
Payer: COMMERCIAL

## 2018-10-29 ENCOUNTER — APPOINTMENT (OUTPATIENT)
Dept: MAMMOGRAPHY | Facility: HOSPITAL | Age: 48
End: 2018-10-29
Attending: SURGERY
Payer: COMMERCIAL

## 2018-10-29 ENCOUNTER — ANESTHESIA (OUTPATIENT)
Dept: SURGERY | Facility: HOSPITAL | Age: 48
End: 2018-10-29
Payer: COMMERCIAL

## 2018-10-29 VITALS
HEART RATE: 70 BPM | RESPIRATION RATE: 15 BRPM | SYSTOLIC BLOOD PRESSURE: 127 MMHG | BODY MASS INDEX: 40.65 KG/M2 | WEIGHT: 259 LBS | OXYGEN SATURATION: 100 % | HEIGHT: 67 IN | DIASTOLIC BLOOD PRESSURE: 75 MMHG | TEMPERATURE: 98 F

## 2018-10-29 VITALS
OXYGEN SATURATION: 97 % | HEART RATE: 73 BPM | SYSTOLIC BLOOD PRESSURE: 119 MMHG | DIASTOLIC BLOOD PRESSURE: 81 MMHG | RESPIRATION RATE: 18 BRPM

## 2018-10-29 DIAGNOSIS — D05.92 NEOPLASM OF LEFT BREAST WITH PRIMARY TUMOR STAGING CATEGORY TIS: ICD-10-CM

## 2018-10-29 DIAGNOSIS — D05.12 NEOPLASM OF LEFT BREAST, PRIMARY TUMOR STAGING CATEGORY TIS: DUCTAL CARCINOMA IN SITU (DCIS): ICD-10-CM

## 2018-10-29 PROCEDURE — 76098 X-RAY EXAM SURGICAL SPECIMEN: CPT | Performed by: SURGERY

## 2018-10-29 PROCEDURE — 10036 PLMT SFT TISS LOCLZJ DEV EA: CPT | Performed by: SURGERY

## 2018-10-29 PROCEDURE — 0HBU0ZZ EXCISION OF LEFT BREAST, OPEN APPROACH: ICD-10-PCS | Performed by: SURGERY

## 2018-10-29 PROCEDURE — 19281 PERQ DEVICE BREAST 1ST IMAG: CPT | Performed by: SURGERY

## 2018-10-29 PROCEDURE — A4216 STERILE WATER/SALINE, 10 ML: HCPCS

## 2018-10-29 PROCEDURE — 81025 URINE PREGNANCY TEST: CPT

## 2018-10-29 PROCEDURE — 88360 TUMOR IMMUNOHISTOCHEM/MANUAL: CPT | Performed by: SURGERY

## 2018-10-29 PROCEDURE — 19282 PERQ DEVICE BREAST EA IMAG: CPT | Performed by: SURGERY

## 2018-10-29 PROCEDURE — 88307 TISSUE EXAM BY PATHOLOGIST: CPT | Performed by: SURGERY

## 2018-10-29 PROCEDURE — 10035 PLMT SFT TISS LOCLZJ DEV 1ST: CPT | Performed by: SURGERY

## 2018-10-29 RX ORDER — MORPHINE SULFATE 4 MG/ML
4 INJECTION, SOLUTION INTRAMUSCULAR; INTRAVENOUS EVERY 10 MIN PRN
Status: DISCONTINUED | OUTPATIENT
Start: 2018-10-29 | End: 2018-10-29

## 2018-10-29 RX ORDER — CEFAZOLIN SODIUM/WATER 2 G/20 ML
2 SYRINGE (ML) INTRAVENOUS ONCE
Status: COMPLETED | OUTPATIENT
Start: 2018-10-29 | End: 2018-10-29

## 2018-10-29 RX ORDER — ACETAMINOPHEN 500 MG
1000 TABLET ORAL ONCE
Status: COMPLETED | OUTPATIENT
Start: 2018-10-29 | End: 2018-10-29

## 2018-10-29 RX ORDER — HYDROCODONE BITARTRATE AND ACETAMINOPHEN 5; 325 MG/1; MG/1
2 TABLET ORAL AS NEEDED
Status: DISCONTINUED | OUTPATIENT
Start: 2018-10-29 | End: 2018-10-29

## 2018-10-29 RX ORDER — LIDOCAINE HYDROCHLORIDE 10 MG/ML
10 INJECTION, SOLUTION EPIDURAL; INFILTRATION; INTRACAUDAL; PERINEURAL ONCE
Status: DISCONTINUED | OUTPATIENT
Start: 2018-10-29 | End: 2018-10-31

## 2018-10-29 RX ORDER — METOCLOPRAMIDE 10 MG/1
10 TABLET ORAL ONCE
Status: DISCONTINUED | OUTPATIENT
Start: 2018-10-29 | End: 2018-10-29 | Stop reason: HOSPADM

## 2018-10-29 RX ORDER — HYDROCODONE BITARTRATE AND ACETAMINOPHEN 5; 325 MG/1; MG/1
1 TABLET ORAL AS NEEDED
Status: DISCONTINUED | OUTPATIENT
Start: 2018-10-29 | End: 2018-10-29

## 2018-10-29 RX ORDER — BUPIVACAINE HYDROCHLORIDE 5 MG/ML
INJECTION, SOLUTION EPIDURAL; INTRACAUDAL AS NEEDED
Status: DISCONTINUED | OUTPATIENT
Start: 2018-10-29 | End: 2018-10-29 | Stop reason: HOSPADM

## 2018-10-29 RX ORDER — FAMOTIDINE 20 MG/1
20 TABLET ORAL ONCE
Status: COMPLETED | OUTPATIENT
Start: 2018-10-29 | End: 2018-10-29

## 2018-10-29 RX ORDER — NALOXONE HYDROCHLORIDE 0.4 MG/ML
80 INJECTION, SOLUTION INTRAMUSCULAR; INTRAVENOUS; SUBCUTANEOUS AS NEEDED
Status: DISCONTINUED | OUTPATIENT
Start: 2018-10-29 | End: 2018-10-29

## 2018-10-29 RX ORDER — SODIUM CHLORIDE, SODIUM LACTATE, POTASSIUM CHLORIDE, CALCIUM CHLORIDE 600; 310; 30; 20 MG/100ML; MG/100ML; MG/100ML; MG/100ML
INJECTION, SOLUTION INTRAVENOUS CONTINUOUS
Status: DISCONTINUED | OUTPATIENT
Start: 2018-10-29 | End: 2018-10-29

## 2018-10-29 RX ORDER — LIDOCAINE HYDROCHLORIDE AND EPINEPHRINE 10; 10 MG/ML; UG/ML
INJECTION, SOLUTION INFILTRATION; PERINEURAL AS NEEDED
Status: DISCONTINUED | OUTPATIENT
Start: 2018-10-29 | End: 2018-10-29 | Stop reason: HOSPADM

## 2018-10-29 RX ORDER — HYDROCODONE BITARTRATE AND ACETAMINOPHEN 5; 325 MG/1; MG/1
1-2 TABLET ORAL EVERY 6 HOURS PRN
Qty: 35 TABLET | Refills: 0 | Status: SHIPPED | OUTPATIENT
Start: 2018-10-29 | End: 2018-11-07

## 2018-10-29 RX ORDER — MORPHINE SULFATE 4 MG/ML
2 INJECTION, SOLUTION INTRAMUSCULAR; INTRAVENOUS EVERY 10 MIN PRN
Status: DISCONTINUED | OUTPATIENT
Start: 2018-10-29 | End: 2018-10-29

## 2018-10-29 RX ORDER — MORPHINE SULFATE 10 MG/ML
6 INJECTION, SOLUTION INTRAMUSCULAR; INTRAVENOUS EVERY 10 MIN PRN
Status: DISCONTINUED | OUTPATIENT
Start: 2018-10-29 | End: 2018-10-29

## 2018-10-29 RX ORDER — LIDOCAINE HYDROCHLORIDE 10 MG/ML
INJECTION, SOLUTION EPIDURAL; INFILTRATION; INTRACAUDAL; PERINEURAL
Status: DISCONTINUED
Start: 2018-10-29 | End: 2018-10-29

## 2018-10-29 RX ORDER — MIDAZOLAM HYDROCHLORIDE 1 MG/ML
INJECTION INTRAMUSCULAR; INTRAVENOUS AS NEEDED
Status: DISCONTINUED | OUTPATIENT
Start: 2018-10-29 | End: 2018-10-29 | Stop reason: SURG

## 2018-10-29 RX ORDER — ONDANSETRON 2 MG/ML
4 INJECTION INTRAMUSCULAR; INTRAVENOUS ONCE AS NEEDED
Status: DISCONTINUED | OUTPATIENT
Start: 2018-10-29 | End: 2018-10-29

## 2018-10-29 RX ADMIN — CEFAZOLIN SODIUM/WATER 2 G: 2 G/20 ML SYRINGE (ML) INTRAVENOUS at 11:54:00

## 2018-10-29 RX ADMIN — SODIUM CHLORIDE, SODIUM LACTATE, POTASSIUM CHLORIDE, CALCIUM CHLORIDE: 600; 310; 30; 20 INJECTION, SOLUTION INTRAVENOUS at 12:54:00

## 2018-10-29 RX ADMIN — MIDAZOLAM HYDROCHLORIDE 2 MG: 1 INJECTION INTRAMUSCULAR; INTRAVENOUS at 11:54:00

## 2018-10-29 NOTE — H&P
History of Present Illness:    Ms. Isra Pacheco is a 40year old woman who presented last in June 2017 with multiple concerns related to her Left breast. She reports that several years ago, she developed sudden onset of Left breast swelling.  This was asso history of breast cancer. Presently she has no new clinical concerns today. She had a mammogram performed on June 30, 2016 which was reported as benign.   She did undergo MRI on January 10, 2017 for high risk screening protocol which demonstrated what ravinder deficiency              Past Surgical History:   Procedure Laterality Date   •        • MARISOL BIOPSY STEREOTACTIC NODULE 2 SITE BILAT        negative   • TONSILLECTOMY       Left Breast Stereotactic biopsy in 3/2014-Benign     Gynecological Hist She moved to [facility] from [place] in July 2013 with her 2 daughters.  She is  and was living in [place] previously.      Review of Systems:  General:    The patient denies, fever, chills, night sweats, fatigue, generalized weakness, boudreaux muscle aches/pain, joint pain, stiff joints, neck pain, back pain or bone pain.     Neuropsychiatric:  There is no history of migraines or severe headaches, seizure/epilepsy, speech problems, coordination problems, trembling/tremors, fainting/black outs, d are no dominant masses in the breast but with obvious palpable dense tissue that is asymmetrical compared to the contralateral breast. The axillary tail is normal.     Abdomen:  The abdomen is soft, flat and non tender. The liver is not enlarged.  There are treatment. The reasons for this were explained.  I explained that for pure DCIS, systemic therapy is not required, although endocrine agents such as tamoxifen can be used in women with hormone sensitive disease in order to reduce the risk of local recurrenc

## 2018-10-29 NOTE — ANESTHESIA POSTPROCEDURE EVALUATION
Patient: Phyllistine Norden    Procedure Summary     Date:  10/29/18 Room / Location:  [address] [place] [address] MAIN OR 17 / [address] [place] [address] MAIN OR    Anesthesia Start:  4868 Anesthesia Stop:  8679    Procedure:  BREAST BIOPSY NEEDLE LOCALIZATION (Left Breast) Diagnosis:       Neoplasm o

## 2018-10-29 NOTE — IMAGING NOTE
Pt  To mammography department  Arrived late talked with Sunny crawley rn Pt was not ready and transport was sent away  At [facility] scouts completed by  [facility] mammography technologist 1liter lr with 850 ml noted in bag  Iv lr with     [phone]

## 2018-10-29 NOTE — ANESTHESIA PREPROCEDURE EVALUATION
Anesthesia PreOp Note    HPI:     Ryan Wolf is a 53year old female who presents for preoperative consultation requested by: Kathi Humphries MD    Date of Surgery: 10/29/2018    Procedure(s):  BREAST BIOPSY NEEDLE LOCALIZATION  Indication: Neop 2014    negative   • SINUS SURGERY       • TONSILLECTOMY           Medications Prior to Admission:  Multiple Vitamin (MULTIVITAMINS) Oral Cap Take 1 capsule by mouth daily.  Disp:  Rfl:  10/25/2018   ALPRAZolam 0.25 MG Oral Tab Take 1 tablet (0.25 mg total) file      Highest education level: Not on file    Social Needs      Financial resource strain: Not on file      Food insecurity - worry: Not on file      Food insecurity - inability: Not on file      Transportation needs - medical: Not on file      Transpo history of anesthetic complications   Airway   Mallampati: I  TM distance: >3 FB  Neck ROM: full  Dental      Pulmonary - normal exam   Cardiovascular   Exercise tolerance: good    Rhythm: regular  Rate: normal    Neuro/Psych      GI/Hepatic/Renal      End

## 2018-10-29 NOTE — BRIEF OP NOTE
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 1

## 2018-10-29 NOTE — PROCEDURES
[facility] HOSP - [facility]  Procedure Note    Dallas Olszewski Patient Status:  Outpatient    1970 MRN [id number]   Location [address] [facility] Attending Apple Mckeon MD   Hosp Day # 0 PCP Artis Bell MD     Procedure:

## 2018-10-30 NOTE — OPERATIVE REPORT
[facility]    PATIENT'S NAME: Rosario Villalobos JULI   ATTENDING PHYSICIAN: Coleman Valdovinos. Chantal Gil MD   OPERATING PHYSICIAN: Coleman Valdovinos.  Chantal Gil MD   PATIENT ACCOUNT#:   [id number]    LOCATION:  [facility]  MEDICAL RECORD #:   [id number] breast.  She was then transferred to the operating room and placed in supine position. She was properly padded and secured. She was given a dose of IV antibiotics, and sequential compression devices were applied to her legs for DVT prophylaxis.   Monitore to the level of pectoralis fascia with a running 3-0 PDS suture. The wound was irrigated with warm sterile saline. Hemostasis assured with an interrupted 3-0 Vicryl for deep layer and a running 4-0 subcuticular Monocryl for the skin.   Mastisol and Steri-

## 2018-10-31 NOTE — PROGRESS NOTES
Pathology results noted. Patient calculated for likelihood of positive SLN as 19% based on the [facility] algorithm.

## 2018-11-01 ENCOUNTER — OFFICE VISIT (OUTPATIENT)
Dept: SURGERY | Facility: CLINIC | Age: 48
End: 2018-11-01
Payer: COMMERCIAL

## 2018-11-01 ENCOUNTER — TELEPHONE (OUTPATIENT)
Dept: SURGERY | Facility: CLINIC | Age: 48
End: 2018-11-01

## 2018-11-01 ENCOUNTER — APPOINTMENT (OUTPATIENT)
Dept: RADIATION ONCOLOGY | Facility: HOSPITAL | Age: 48
End: 2018-11-01
Attending: RADIOLOGY
Payer: COMMERCIAL

## 2018-11-01 VITALS
WEIGHT: 258.06 LBS | BODY MASS INDEX: 40.98 KG/M2 | HEART RATE: 79 BPM | SYSTOLIC BLOOD PRESSURE: 134 MMHG | OXYGEN SATURATION: 97 % | DIASTOLIC BLOOD PRESSURE: 82 MMHG | HEIGHT: 66.5 IN | RESPIRATION RATE: 18 BRPM

## 2018-11-01 DIAGNOSIS — E66.01 MORBID OBESITY WITH BMI OF 40.0-44.9, ADULT (HCC): ICD-10-CM

## 2018-11-01 DIAGNOSIS — R60.0 LOWER EXTREMITY EDEMA: ICD-10-CM

## 2018-11-01 DIAGNOSIS — R63.2 BINGE EATING: ICD-10-CM

## 2018-11-01 DIAGNOSIS — R53.82 CHRONIC FATIGUE: ICD-10-CM

## 2018-11-01 DIAGNOSIS — E78.2 HYPERCHOLESTEROLEMIA WITH HYPERTRIGLYCERIDEMIA: Primary | ICD-10-CM

## 2018-11-01 DIAGNOSIS — F43.9 STRESS: ICD-10-CM

## 2018-11-01 PROCEDURE — 99214 OFFICE O/P EST MOD 30 MIN: CPT | Performed by: INTERNAL MEDICINE

## 2018-11-01 RX ORDER — TOPIRAMATE 25 MG/1
25 TABLET ORAL 2 TIMES DAILY
Qty: 60 TABLET | Refills: 1 | Status: SHIPPED | OUTPATIENT
Start: 2018-11-01 | End: 2018-12-13

## 2018-11-01 RX ORDER — HYDROCHLOROTHIAZIDE 12.5 MG/1
12.5 CAPSULE, GELATIN COATED ORAL DAILY
Qty: 30 CAPSULE | Refills: 1 | Status: SHIPPED | OUTPATIENT
Start: 2018-11-01 | End: 2019-01-23

## 2018-11-01 NOTE — PROGRESS NOTES
[address], [address]  Dept: [phone]       Patient:  Olvin Gorman  :      1970  MRN:      [id number]    Chief Complaint:  Patient presents hours as needed for Pain. Disp: 35 tablet Rfl: 0   Multiple Vitamin (MULTIVITAMINS) Oral Cap Take 1 capsule by mouth daily. Disp:  Rfl:    ALPRAZolam 0.25 MG Oral Tab Take 1 tablet (0.25 mg total) by mouth nightly as needed for Anxiety.  Disp: 30 tablet Rfl Procedure Laterality Date   • BENIGN BIOPSY LEFT     • BENIGN BIOPSY RIGHT     • BREAST BIOPSY NEEDLE LOCALIZATION Left 10/29/2018    Performed by Cristino Reid MD at [facility] OR   •      • MARISOL BIOPSY STEREOTACTIC NODULE 2 SITE BILAT  2014 Constitutional: positive for fatigue  Respiratory: positive for dyspnea on exertion  Cardiovascular: negative  Gastrointestinal: positive for reflux symptoms  Musculoskeletal:positive for arthralgias and back pain  Neurological: negative  Behavioral/Psyc of 40.0-44.9, adult ([facility]  -     PSYCHOLOGY - INTERNAL    Stress  -     PSYCHOLOGY - INTERNAL    Binge eating    Lower extremity edema  -     hydrochlorothiazide 12.5 MG Oral Cap; Take 1 capsule (12.5 mg total) by mouth daily.     Other orders  -     topira

## 2018-11-01 NOTE — TELEPHONE ENCOUNTER
I contacted the patient to review Dr Vgea Solid email regarding her pathology. Pt reports that she is feeling better today, with still some pain. Using the norco and advil.  Reviewed to monitor for constipation, with taking the norco.  She did have a bad

## 2018-11-07 ENCOUNTER — OFFICE VISIT (OUTPATIENT)
Dept: HEMATOLOGY/ONCOLOGY | Facility: HOSPITAL | Age: 48
End: 2018-11-07
Attending: INTERNAL MEDICINE
Payer: COMMERCIAL

## 2018-11-07 ENCOUNTER — OFFICE VISIT (OUTPATIENT)
Dept: SURGERY | Facility: CLINIC | Age: 48
End: 2018-11-07
Payer: COMMERCIAL

## 2018-11-07 VITALS
TEMPERATURE: 98 F | BODY MASS INDEX: 40.18 KG/M2 | WEIGHT: 253 LBS | SYSTOLIC BLOOD PRESSURE: 133 MMHG | DIASTOLIC BLOOD PRESSURE: 83 MMHG | HEART RATE: 99 BPM | RESPIRATION RATE: 16 BRPM | HEIGHT: 66.5 IN

## 2018-11-07 VITALS
TEMPERATURE: 98 F | HEIGHT: 66.5 IN | HEART RATE: 99 BPM | BODY MASS INDEX: 40.18 KG/M2 | DIASTOLIC BLOOD PRESSURE: 83 MMHG | WEIGHT: 253 LBS | SYSTOLIC BLOOD PRESSURE: 133 MMHG | RESPIRATION RATE: 16 BRPM

## 2018-11-07 DIAGNOSIS — C50.412 MALIGNANT NEOPLASM OF UPPER-OUTER QUADRANT OF LEFT BREAST IN FEMALE, ESTROGEN RECEPTOR POSITIVE (HCC): Primary | ICD-10-CM

## 2018-11-07 DIAGNOSIS — Z17.0 MALIGNANT NEOPLASM OF UPPER-OUTER QUADRANT OF LEFT BREAST IN FEMALE, ESTROGEN RECEPTOR POSITIVE (HCC): Primary | ICD-10-CM

## 2018-11-07 DIAGNOSIS — D05.12 NEOPLASM OF LEFT BREAST, PRIMARY TUMOR STAGING CATEGORY TIS: DUCTAL CARCINOMA IN SITU (DCIS): ICD-10-CM

## 2018-11-07 PROBLEM — C50.912 INFILTRATING DUCTAL CARCINOMA OF LEFT BREAST (HCC): Status: ACTIVE | Noted: 2018-11-07

## 2018-11-07 PROCEDURE — 99024 POSTOP FOLLOW-UP VISIT: CPT | Performed by: SURGERY

## 2018-11-07 PROCEDURE — 99245 OFF/OP CONSLTJ NEW/EST HI 55: CPT | Performed by: INTERNAL MEDICINE

## 2018-11-07 RX ORDER — HYDROCODONE BITARTRATE AND ACETAMINOPHEN 5; 325 MG/1; MG/1
1-2 TABLET ORAL EVERY 6 HOURS PRN
Qty: 35 TABLET | Refills: 0 | Status: SHIPPED | OUTPATIENT
Start: 2018-11-07 | End: 2018-11-20

## 2018-11-07 NOTE — PROGRESS NOTES
Breast Surgery Post-Operative Visit    Diagnosis: Left breast cancer status post lumpectomy on October 29, 2018.     Stage: Cancer Staging  Malignant neoplasm of upper-outer quadrant of left breast in female, estrogen receptor positive ([facility]  Staging form: symptoms and was consistent with dense breast tissue corresponding to the asymmetry seen on mammogram with a visible biopsy tract for which a 3 month surveillance was recommended to document stability.  She returned on February 6, 2015 for the Left breast s focus of ductal carcinoma in situ that was ER/RI positive. Of note, the patient did undergo a comprehensive genetic evaluation in May 2015 that was negative. She elected for breast conserving therapy which took place last week without complication.   She Tab Take 1 tablet (0.25 mg total) by mouth nightly as needed for Anxiety. Disp: 30 tablet Rfl: 0   Melatonin 5 MG Oral Tab Take by mouth. Disp:  Rfl:    Sertraline HCl 100 MG Oral Tab Take 1 tablet (100 mg total) by mouth daily.  Disp: 90 tablet Rfl: 1 irregular heartbeat, fainting or near-fainting, difficulty breathing when lying flat, SOB/Coughing at night, swelling of the legs or chest pain while walking.     Breasts:  See history of present illness    Gastrointestinal:      There is no history of diff Physical Examination:   Examination shows that the surgical sites are clean, dry, and healing well.       Assessment:  52 y/o F with h/o benign Left breast biopsy and an increased risk for breast cancer based on multiple risk assessment models with new

## 2018-11-07 NOTE — PROGRESS NOTES
VU       Alanna Thomas is a 53year old female who is here today due to new diagnosis of Malignant neoplasm of upper-outer quadrant of left breast in female, estrogen receptor positive (hcc)  (primary encounter diagnosis)       Method of detection: 6, 2015 for the Left breast surveillance US which confirmed that the area related to her biopsy which was smaller compared to prior and thought to be representative of post-biopsy changes.       Since that time, she has been followed closely for high risk s elected for breast conservation therapy for the DCIS. She underwent a lumpectomy on 10/29/2018. Final pathology showed that the DCIS was associated with an incidental 4 mm focus of invasive ductal carcinoma, ER 60%, LA 35%, HER-2/josefa 1+, Ki-67 12%. intolerance. Genitourinary: Negative for dysuria, frequency, menstrual problem and urgency. Musculoskeletal: Positive for back pain and neck pain. Negative for arthralgias. Below chronic   Skin: Negative for rash.    Neurological: Positive for he History:   Procedure Laterality Date   • BENIGN BIOPSY LEFT     • BENIGN BIOPSY RIGHT     • BREAST BIOPSY NEEDLE LOCALIZATION Left 10/29/2018    Performed by Kerline Dalton MD at [facility] OR   •      • MARISOL BIOPSY STEREOTACTIC NODULE 2 SITE VALERIE primary   • Cancer Maternal Grandfather 79        prostate/bladder ca   • Heart Disorder Father    • Diabetes Father    • Hypertension Father    • Other (Other) Mother         cushings syndrome         PHYSICAL EXAM:    /83 (BP Location: Left arm, Pa person, place, and time. Gait normal.   Skin: No rash noted. No erythema. Psychiatric: She has a normal mood and affect. Her behavior is normal. Judgment and thought content normal.   Nursing note and vitals reviewed.           ASSESSMENT/PLAN:   Alyce reassured and will discuss further treatment plan upon next  appointment after she completes her sentinel lymph node biopsy. All questions answered to the best of my abilities. No orders of the defined types were placed in this encounter.       Results performed in 2017 in the right breast at 9 o'clock demonstrated 1.1 cm and 0.9 cm benign-appearing intramammary lymph nodes for which followup MRI was recommended given patient's high risk.  Documented family   history of breast carcinoma in sister age 52, ultrasound and provide an additional report                                  PLEASE NOTE: NORMAL MAMMOGRAM DOES NOT EXCLUDE THE POSSIBILITY OF BREAST CANCER. A CLINICALLY SUSPICIOUS PALPABLE LUMP SHOULD BE BIOPSIED.        For patients over the age of 36, area in the right axilla with pain and swelling. MRI demonstrated   subcutaneous and skin enhancement, question represent a ruptured sebaceous cyst, without suspicious mass like lesion.  A second-look ultrasound was performed which demonstrated no suspicio nodes   maintain fatty hilum but appear slightly more prominent in size compared to prior examination.      At 9 o'clock, a 9.1 cm posterior to the nipple, there is an oval circumscribed enhancing mass measuring 1.3 x 0.9 x 1.0 cm (74525/58, 107/471), large short-term followup breast MRI is recommended in 6 months. Continued subtle curvilinear enhancement without suspicious mass in the low right axilla, similar to prior exam, over the patient's palpable area of concern.   If the patient has no ongoing infe Roberto Henning MD on 10/03/2018 at 8:05          Addended by Roberto Henning MD on 10/3/2018  8:05 AM   Surgical Pathology                                Case: [id number]                                 Authorizing Provider:  Norman Burciaga MD      Co Positive, 85% (strong intensity)  Progesterone receptor (Leica, monoclonal, clone 16) status: Positive, 85% (strong intensity)     ASCO/CAP Scoring Criteria:  ER/PgR:    > 1% (Positive), Intensity of staining (weak, moderate, strong)  <1% (Negative) Intern left, 3. LEFT BREAST LUMPECTOMY MARGIN, DEEP MARGIN, CLIP MATOS TRUE                MARGIN                                                                                            D) - Breast, left, 4.   LEFT BREAST LUMPECTOMY MARGIN, MEDIAL MARGIN, CLIP or malignancy is identified. C. Left breast; deep margin; reexcision:   · Fibroadipose tissue with no significant pathologic changes. · No evidence of ductal epithelial atypia or malignancy is identified.      D. Left breast, medial margin; reexcision: paraffin embedded tissue (block A8):      Estrogen receptor (Leica, monoclonal, clone 6 F11) status: 68% (Positive, strong intensity)  Progesterone receptor (Leica, monoclonal, clone 16) status: 35% (Positive, moderate intensity)  HER-2/josefa (Leica, monoclo Nuclear Pleomorphism  Score 2    Mitotic Rate  Score 2 (4-7 mitoses per mm2)    Number of Mitoses per 10 High-Power Fields  4 mitotic figures   Diameter of Microscope Field in Millimeters (mm)  0.51 Millimeters (mm)   Overall Grade  Grade 2 (scores of 6

## 2018-11-08 ENCOUNTER — TELEPHONE (OUTPATIENT)
Dept: FAMILY MEDICINE CLINIC | Facility: CLINIC | Age: 48
End: 2018-11-08

## 2018-11-08 ENCOUNTER — TELEPHONE (OUTPATIENT)
Dept: HEMATOLOGY/ONCOLOGY | Facility: HOSPITAL | Age: 48
End: 2018-11-08

## 2018-11-08 ENCOUNTER — TELEPHONE (OUTPATIENT)
Dept: SURGERY | Facility: CLINIC | Age: 48
End: 2018-11-08

## 2018-11-08 DIAGNOSIS — Z12.11 COLON CANCER SCREENING: Primary | ICD-10-CM

## 2018-11-08 DIAGNOSIS — Z83.71 FH: COLON POLYPS: ICD-10-CM

## 2018-11-08 RX ORDER — VENLAFAXINE HYDROCHLORIDE 150 MG/1
150 CAPSULE, EXTENDED RELEASE ORAL DAILY
Qty: 30 CAPSULE | Refills: 0 | Status: SHIPPED | OUTPATIENT
Start: 2018-11-08 | End: 2018-12-08

## 2018-11-08 NOTE — TELEPHONE ENCOUNTER
Received a call from Dr. Pat Pineda. She discussed that patient would likely need to get on tamoxifen. She discussed that patient is currently on sertraline and this may decrease the effectiveness of tamoxifen.   She recommended Effexor which may also help with

## 2018-11-08 NOTE — TELEPHONE ENCOUNTER
I contacted the patient to let her know we are working on a surgery date for her. I asked if she was open to consider [facility] for location. Pt prefers [place].   I let her know we would reach out when we had more info on surgery dates.

## 2018-11-08 NOTE — TELEPHONE ENCOUNTER
After appointment yesterday with Dr. Katia Acosta pt asked for a referral for a MD for a colonoscopy as discussed in appointment. Pt was informed that would call back after discussed with Dr. Katia Acosta. Pt called and notified that Dr. Katia Acosta will be calling her PCP regarding

## 2018-11-09 RX ORDER — SERTRALINE HYDROCHLORIDE 100 MG/1
100 TABLET, FILM COATED ORAL DAILY
COMMUNITY
End: 2019-01-05 | Stop reason: ALTCHOICE

## 2018-11-12 ENCOUNTER — HOSPITAL ENCOUNTER (OUTPATIENT)
Dept: NUCLEAR MEDICINE | Facility: HOSPITAL | Age: 48
Discharge: HOME | End: 2018-11-12
Attending: SURGERY
Payer: COMMERCIAL

## 2018-11-12 ENCOUNTER — HOSPITAL ENCOUNTER (OUTPATIENT)
Facility: HOSPITAL | Age: 48
Setting detail: HOSPITAL OUTPATIENT SURGERY
Discharge: HOME | End: 2018-11-12
Attending: SURGERY | Admitting: SURGERY
Payer: COMMERCIAL

## 2018-11-12 ENCOUNTER — ANESTHESIA EVENT (OUTPATIENT)
Dept: SURGERY | Facility: HOSPITAL | Age: 48
End: 2018-11-12
Payer: COMMERCIAL

## 2018-11-12 ENCOUNTER — ANESTHESIA (OUTPATIENT)
Dept: SURGERY | Facility: HOSPITAL | Age: 48
End: 2018-11-12
Payer: COMMERCIAL

## 2018-11-12 VITALS
OXYGEN SATURATION: 95 % | HEIGHT: 67 IN | HEART RATE: 80 BPM | TEMPERATURE: 97 F | BODY MASS INDEX: 39.46 KG/M2 | DIASTOLIC BLOOD PRESSURE: 76 MMHG | SYSTOLIC BLOOD PRESSURE: 130 MMHG | WEIGHT: 251.44 LBS | RESPIRATION RATE: 16 BRPM

## 2018-11-12 DIAGNOSIS — Z17.0 MALIGNANT NEOPLASM OF UPPER-OUTER QUADRANT OF LEFT BREAST IN FEMALE, ESTROGEN RECEPTOR POSITIVE (HCC): ICD-10-CM

## 2018-11-12 DIAGNOSIS — C50.412 MALIGNANT NEOPLASM OF UPPER-OUTER QUADRANT OF LEFT BREAST IN FEMALE, ESTROGEN RECEPTOR POSITIVE (HCC): ICD-10-CM

## 2018-11-12 PROCEDURE — 88342 IMHCHEM/IMCYTCHM 1ST ANTB: CPT | Performed by: SURGERY

## 2018-11-12 PROCEDURE — 07B60ZX EXCISION OF LEFT AXILLARY LYMPHATIC, OPEN APPROACH, DIAGNOSTIC: ICD-10-PCS | Performed by: SURGERY

## 2018-11-12 PROCEDURE — 88307 TISSUE EXAM BY PATHOLOGIST: CPT | Performed by: SURGERY

## 2018-11-12 PROCEDURE — 81025 URINE PREGNANCY TEST: CPT

## 2018-11-12 PROCEDURE — 78195 LYMPH SYSTEM IMAGING: CPT | Performed by: SURGERY

## 2018-11-12 PROCEDURE — 88341 IMHCHEM/IMCYTCHM EA ADD ANTB: CPT | Performed by: SURGERY

## 2018-11-12 RX ORDER — MIDAZOLAM HYDROCHLORIDE 1 MG/ML
INJECTION INTRAMUSCULAR; INTRAVENOUS AS NEEDED
Status: DISCONTINUED | OUTPATIENT
Start: 2018-11-12 | End: 2018-11-12 | Stop reason: SURG

## 2018-11-12 RX ORDER — ONDANSETRON 2 MG/ML
4 INJECTION INTRAMUSCULAR; INTRAVENOUS ONCE AS NEEDED
Status: DISCONTINUED | OUTPATIENT
Start: 2018-11-12 | End: 2018-11-12

## 2018-11-12 RX ORDER — NALOXONE HYDROCHLORIDE 0.4 MG/ML
80 INJECTION, SOLUTION INTRAMUSCULAR; INTRAVENOUS; SUBCUTANEOUS AS NEEDED
Status: ACTIVE | OUTPATIENT
Start: 2018-11-12 | End: 2018-11-12

## 2018-11-12 RX ORDER — SODIUM CHLORIDE, SODIUM LACTATE, POTASSIUM CHLORIDE, CALCIUM CHLORIDE 600; 310; 30; 20 MG/100ML; MG/100ML; MG/100ML; MG/100ML
INJECTION, SOLUTION INTRAVENOUS CONTINUOUS
Status: DISCONTINUED | OUTPATIENT
Start: 2018-11-12 | End: 2018-11-12

## 2018-11-12 RX ORDER — LIDOCAINE HYDROCHLORIDE 10 MG/ML
INJECTION, SOLUTION EPIDURAL; INFILTRATION; INTRACAUDAL; PERINEURAL AS NEEDED
Status: DISCONTINUED | OUTPATIENT
Start: 2018-11-12 | End: 2018-11-12 | Stop reason: SURG

## 2018-11-12 RX ORDER — ACETAMINOPHEN 500 MG
1000 TABLET ORAL ONCE
Status: COMPLETED | OUTPATIENT
Start: 2018-11-12 | End: 2018-11-12

## 2018-11-12 RX ORDER — METHYLENE BLUE 10 MG/ML
INJECTION INTRAVENOUS AS NEEDED
Status: DISCONTINUED | OUTPATIENT
Start: 2018-11-12 | End: 2018-11-12 | Stop reason: HOSPADM

## 2018-11-12 RX ORDER — ONDANSETRON 2 MG/ML
INJECTION INTRAMUSCULAR; INTRAVENOUS AS NEEDED
Status: DISCONTINUED | OUTPATIENT
Start: 2018-11-12 | End: 2018-11-12 | Stop reason: SURG

## 2018-11-12 RX ORDER — DEXAMETHASONE SODIUM PHOSPHATE 4 MG/ML
VIAL (ML) INJECTION AS NEEDED
Status: DISCONTINUED | OUTPATIENT
Start: 2018-11-12 | End: 2018-11-12 | Stop reason: SURG

## 2018-11-12 RX ORDER — HYDROCODONE BITARTRATE AND ACETAMINOPHEN 5; 325 MG/1; MG/1
1 TABLET ORAL AS NEEDED
Status: COMPLETED | OUTPATIENT
Start: 2018-11-12 | End: 2018-11-12

## 2018-11-12 RX ORDER — LIDOCAINE HYDROCHLORIDE AND EPINEPHRINE 10; 10 MG/ML; UG/ML
INJECTION, SOLUTION INFILTRATION; PERINEURAL AS NEEDED
Status: DISCONTINUED | OUTPATIENT
Start: 2018-11-12 | End: 2018-11-12 | Stop reason: HOSPADM

## 2018-11-12 RX ORDER — MORPHINE SULFATE 4 MG/ML
4 INJECTION, SOLUTION INTRAMUSCULAR; INTRAVENOUS EVERY 10 MIN PRN
Status: DISCONTINUED | OUTPATIENT
Start: 2018-11-12 | End: 2018-11-12

## 2018-11-12 RX ORDER — BUPIVACAINE HYDROCHLORIDE 5 MG/ML
INJECTION, SOLUTION EPIDURAL; INTRACAUDAL AS NEEDED
Status: DISCONTINUED | OUTPATIENT
Start: 2018-11-12 | End: 2018-11-12 | Stop reason: HOSPADM

## 2018-11-12 RX ORDER — MORPHINE SULFATE 4 MG/ML
2 INJECTION, SOLUTION INTRAMUSCULAR; INTRAVENOUS EVERY 10 MIN PRN
Status: DISCONTINUED | OUTPATIENT
Start: 2018-11-12 | End: 2018-11-12

## 2018-11-12 RX ORDER — CEFAZOLIN SODIUM/WATER 2 G/20 ML
2 SYRINGE (ML) INTRAVENOUS ONCE
Status: COMPLETED | OUTPATIENT
Start: 2018-11-12 | End: 2018-11-12

## 2018-11-12 RX ORDER — FAMOTIDINE 20 MG/1
20 TABLET ORAL ONCE
Status: COMPLETED | OUTPATIENT
Start: 2018-11-12 | End: 2018-11-12

## 2018-11-12 RX ORDER — HALOPERIDOL 5 MG/ML
0.25 INJECTION INTRAMUSCULAR ONCE AS NEEDED
Status: DISCONTINUED | OUTPATIENT
Start: 2018-11-12 | End: 2018-11-12

## 2018-11-12 RX ORDER — HYDROCODONE BITARTRATE AND ACETAMINOPHEN 5; 325 MG/1; MG/1
2 TABLET ORAL AS NEEDED
Status: COMPLETED | OUTPATIENT
Start: 2018-11-12 | End: 2018-11-12

## 2018-11-12 RX ORDER — MORPHINE SULFATE 10 MG/ML
6 INJECTION, SOLUTION INTRAMUSCULAR; INTRAVENOUS EVERY 10 MIN PRN
Status: DISCONTINUED | OUTPATIENT
Start: 2018-11-12 | End: 2018-11-12

## 2018-11-12 RX ADMIN — CEFAZOLIN SODIUM/WATER 2 G: 2 G/20 ML SYRINGE (ML) INTRAVENOUS at 10:25:00

## 2018-11-12 RX ADMIN — SODIUM CHLORIDE, SODIUM LACTATE, POTASSIUM CHLORIDE, CALCIUM CHLORIDE: 600; 310; 30; 20 INJECTION, SOLUTION INTRAVENOUS at 10:50:00

## 2018-11-12 RX ADMIN — SODIUM CHLORIDE, SODIUM LACTATE, POTASSIUM CHLORIDE, CALCIUM CHLORIDE: 600; 310; 30; 20 INJECTION, SOLUTION INTRAVENOUS at 10:05:00

## 2018-11-12 RX ADMIN — MIDAZOLAM HYDROCHLORIDE 2 MG: 1 INJECTION INTRAMUSCULAR; INTRAVENOUS at 10:06:00

## 2018-11-12 RX ADMIN — DEXAMETHASONE SODIUM PHOSPHATE 4 MG: 4 MG/ML VIAL (ML) INJECTION at 10:14:00

## 2018-11-12 RX ADMIN — MIDAZOLAM HYDROCHLORIDE 2 MG: 1 INJECTION INTRAMUSCULAR; INTRAVENOUS at 10:53:00

## 2018-11-12 RX ADMIN — LIDOCAINE HYDROCHLORIDE 50 MG: 10 INJECTION, SOLUTION EPIDURAL; INFILTRATION; INTRACAUDAL; PERINEURAL at 10:11:00

## 2018-11-12 RX ADMIN — ONDANSETRON 4 MG: 2 INJECTION INTRAMUSCULAR; INTRAVENOUS at 10:14:00

## 2018-11-12 NOTE — ANESTHESIA PREPROCEDURE EVALUATION
Anesthesia PreOp Note    HPI:     Eyal Murray is a 53year old female who presents for preoperative consultation requested by: Ellen Zapata MD    Date of Surgery: 11/12/2018    Procedure(s):  BREAST SENTINEL LYMPH NODE BIOPSY  Indication: [facility] [place] • Vitamin D deficiency        Past Surgical History:   Procedure Laterality Date   • BENIGN BIOPSY LEFT     • BENIGN BIOPSY RIGHT     • BREAST BIOPSY NEEDLE LOCALIZATION Left 10/29/2018    Performed by Ca Fernandez MD at [address] [place] [address] OR   •  Onset   • Breast Cancer Paternal Grandmother 37        d.  79   • Breast Cancer Sister 52   • Cancer Maternal Uncle [de-identified]        unknown primary   • Cancer Maternal Grandfather 79        prostate/bladder ca   • Heart Disorder Father    • Diabetes Father    • H is 5' 7\" and weight is 251 lb 7 oz. Her oral temperature is 98.6 °F (37 °C). Her blood pressure is 123/77 and her pulse is 69. Her respiration is 16 and oxygen saturation is 98%.     11/09/18  1529 11/12/18  0700   BP:  123/77   BP Location:  Right arm   P

## 2018-11-12 NOTE — OPERATIVE REPORT
[facility]    PATIENT'S NAME: Summer BUSTOS   ATTENDING PHYSICIAN: Carmella Duval. Gato Barger MD   OPERATING PHYSICIAN: Carmella Duval.  Gato Barger MD   PATIENT ACCOUNT#:   [id number]    LOCATION:  [facility] [address] PACU 11 [facility] 10  MEDICAL RECORD #:   [id number] node identification. She was then transferred to the operating room, placed in the supine position. She was properly padded and secured.   She was given a dose of IV antibiotics, and sequential compression devices were applied to her legs for DVT prophyla

## 2018-11-12 NOTE — ANESTHESIA PROCEDURE NOTES
ANESTHESIA INTUBATION  Date/Time: 11/12/2018 10:14 AM  Urgency: elective      General Information and Staff    Patient location during procedure: OR  Anesthesiologist: Lendel Habermann, MD  Resident/CRNA: Tyler Hall CRNA  Performed: CRNA     Indications

## 2018-11-12 NOTE — ANESTHESIA POSTPROCEDURE EVALUATION
Patient: Dallas Olszewski    Procedure Summary     Date:  11/12/18 Room / Location:  [address] [place] [address] MAIN OR 08 / 300 [facility] MAIN OR    Anesthesia Start:  6141 Anesthesia Stop:  1101    Procedure:  BREAST SENTINEL LYMPH NODE BIOPSY (Left Breast) Diagnosis:       Malignant

## 2018-11-12 NOTE — H&P
Diagnosis: Left breast cancer status post lumpectomy on October 29, 2018.     Stage: Cancer Staging  Malignant neoplasm of upper-outer quadrant of left breast in female, estrogen receptor positive ([facility]  Staging form: Breast, AJCC 8th Edition  - Pathologic dense breast tissue corresponding to the asymmetry seen on mammogram with a visible biopsy tract for which a 3 month surveillance was recommended to document stability.  She returned on February 6, 2015 for the Left breast surveillance US which confirmed th situ that was ER/LA positive. Of note, the patient did undergo a comprehensive genetic evaluation in May 2015 that was negative. She elected for breast conserving therapy which took place last week without complication.   She denies any fevers, chills, er Rfl: ALPRAZolam 0.25 MG Oral Tab Take 1 tablet (0.25 mg total) by mouth nightly as needed for Anxiety. Disp: 30 tablet Rfl: 0   Melatonin 5 MG Oral Tab Take by mouth.  Disp:  Rfl:    Sertraline HCl 100 MG Oral Tab Take 1 tablet (100 mg total) by mouth da chest pain, chest pressure/discomfort, palpitations, irregular heartbeat, fainting or near-fainting, difficulty breathing when lying flat, SOB/Coughing at night, swelling of the legs or chest pain while walking.     Breasts:  See history of present illness 6.5\")   Wt 114.8 kg (253 lb)   LMP 10/10/2018   BMI 40.23 kg/m²      Physical Examination:   Examination shows that the surgical sites are clean, dry, and healing well.       Assessment:  52 y/o F with h/o benign Left breast biopsy and an increased risk f estrogen receptor positive ([facility] [C50.412, Z17.0]    The above referenced H&P was reviewed by Mami Eddy MD on 11/12/2018, the patient was examined and no significant changes have occurred in the patient's condition since the H&P was performed.   I d

## 2018-11-12 NOTE — BRIEF OP NOTE
Pre-Operative Diagnosis: Malignant neoplasm of upper-outer quadrant of left breast in female, estrogen receptor positive ([facility] [C50.412, Z17.0]     Post-Operative Diagnosis: Malignant neoplasm of upper-outer quadrant of left breast in female, estrogen rece

## 2018-11-13 ENCOUNTER — TELEPHONE (OUTPATIENT)
Dept: SURGERY | Facility: CLINIC | Age: 48
End: 2018-11-13

## 2018-11-13 NOTE — TELEPHONE ENCOUNTER
I contacted the patient to let her know the pathology from SLN, was benign. Pt verbalized understanding. She is taking the vicodin for pain management. Confirmed her post op visit, and will call sooner if needed.

## 2018-11-20 ENCOUNTER — OFFICE VISIT (OUTPATIENT)
Dept: SURGERY | Facility: CLINIC | Age: 48
End: 2018-11-20
Payer: COMMERCIAL

## 2018-11-20 VITALS
SYSTOLIC BLOOD PRESSURE: 134 MMHG | TEMPERATURE: 99 F | HEART RATE: 90 BPM | OXYGEN SATURATION: 99 % | WEIGHT: 251.44 LBS | BODY MASS INDEX: 39 KG/M2 | RESPIRATION RATE: 16 BRPM | DIASTOLIC BLOOD PRESSURE: 88 MMHG

## 2018-11-20 DIAGNOSIS — Z17.0 MALIGNANT NEOPLASM OF UPPER-OUTER QUADRANT OF LEFT BREAST IN FEMALE, ESTROGEN RECEPTOR POSITIVE (HCC): Primary | ICD-10-CM

## 2018-11-20 DIAGNOSIS — C50.412 MALIGNANT NEOPLASM OF UPPER-OUTER QUADRANT OF LEFT BREAST IN FEMALE, ESTROGEN RECEPTOR POSITIVE (HCC): Primary | ICD-10-CM

## 2018-11-20 PROCEDURE — 99024 POSTOP FOLLOW-UP VISIT: CPT | Performed by: SURGERY

## 2018-11-20 RX ORDER — HYDROCODONE BITARTRATE AND ACETAMINOPHEN 5; 325 MG/1; MG/1
1-2 TABLET ORAL EVERY 6 HOURS PRN
Qty: 35 TABLET | Refills: 0 | Status: SHIPPED | OUTPATIENT
Start: 2018-11-20 | End: 2019-01-05 | Stop reason: ALTCHOICE

## 2018-11-21 ENCOUNTER — TELEPHONE (OUTPATIENT)
Dept: HEMATOLOGY/ONCOLOGY | Facility: HOSPITAL | Age: 48
End: 2018-11-21

## 2018-12-01 NOTE — PROGRESS NOTES
Breast Surgery Post-Operative Visit    Diagnosis: Left breast cancer status post lumpectomy on October 29, 2018 and now status post sentinel lymph node biopsy in November 12, 2018.     Stage: Cancer Staging  Malignant neoplasm of upper-outer quadrant of lef light of the patient's pain symptoms and was consistent with dense breast tissue corresponding to the asymmetry seen on mammogram with a visible biopsy tract for which a 3 month surveillance was recommended to document stability.  She returned on February 6 left breast demonstrated a 4 mm focus of ductal carcinoma in situ that was ER/WA positive. Of note, the patient did undergo a comprehensive genetic evaluation in May 2015 that was negative.   She elected for breast conserving therapy which took place witho Venlafaxine HCl ER (EFFEXOR XR) 150 MG Oral Capsule SR 24 Hr Take 1 capsule (150 mg total) by mouth daily. Disp: 30 capsule Rfl: 0   topiramate 25 MG Oral Tab Take 1 tablet (25 mg total) by mouth 2 (two) times daily.  Disp: 60 tablet Rfl: 1   Lisdexamfeta ringing in the ears, ear drainage, earaches, nasal congestion, nose bleeds, snoring, pain in mouth/throat, hoarseness, change in voice, facial trauma.     Respiratory:  The patient denies chronic cough, phlegm, hemoptysis, pleurisy/chest pain, pneumonia, as feeling of despair. +anxiety    Endocrine:     There is no history of poor/slow wound healing, weight loss/gain, fertility or hormone problems, cold intolerance, thyroid disease.      Allergic/Immunologic:  There is no history of hives, hay fever, angioedem

## 2018-12-06 ENCOUNTER — OFFICE VISIT (OUTPATIENT)
Dept: RADIATION ONCOLOGY | Facility: HOSPITAL | Age: 48
End: 2018-12-06
Attending: RADIOLOGY
Payer: COMMERCIAL

## 2018-12-06 VITALS
WEIGHT: 252.81 LBS | HEART RATE: 103 BPM | TEMPERATURE: 98 F | DIASTOLIC BLOOD PRESSURE: 94 MMHG | SYSTOLIC BLOOD PRESSURE: 155 MMHG | BODY MASS INDEX: 40 KG/M2 | RESPIRATION RATE: 16 BRPM | OXYGEN SATURATION: 96 %

## 2018-12-06 PROCEDURE — 99212 OFFICE O/P EST SF 10 MIN: CPT

## 2018-12-06 NOTE — PROGRESS NOTES
Nursing Consultation Note  Patient: Iban Arnett  YOB: 1970  Age: 53year old  Radiation Oncologist: Dr. Karen Landrum  Referring Physician: Kristofer Mauricio  Diagnosis:No diagnosis found.   Consult Date: 12/6/2018      Chemotherapy: no  Labs: (150 mg total) by mouth daily. Disp: 30 capsule Rfl: 0   topiramate 25 MG Oral Tab Take 1 tablet (25 mg total) by mouth 2 (two) times daily. Disp: 60 tablet Rfl: 1   hydrochlorothiazide 12.5 MG Oral Cap Take 1 capsule (12.5 mg total) by mouth daily.  Disp: Transportation needs - medical: Not on file      Transportation needs - non-medical: Not on file    Occupational History      Not on file    Tobacco Use      Smoking status: Former Smoker        Packs/day: 0.50        Years: 10.00        Pack years: 5 infection    Progress Toward Outcome:  Making progress    Pamphlets/Handouts Given to Patient:  Basic skin care.          Knowledge Deficit Plan Of Care:    Problem:  Knowledge Deficit    Problems related to:    Radiation therapy  Disease process    Interve

## 2018-12-06 NOTE — CONSULTS
RADIATION ONCOLOGY NOTE    DATE OF VISIT: 12/6/2018    DIAGNOSIS :  Stage IA pT1a, NO(sn)M0, G2, ER: Positive, KS: Positive, HER2: Negative), s/p lumpectomy for adjuvant hormonal therapy and XRT.     Dear Daniel East and colleagues,    Per Breast tumor additional previous biopsy site reaction.   · All specimen inked margins are free of malignancy (infiltrating tumor is seen at approximately 5 mm from the closest inferior margin) (ductal carcinoma in situ is seen focally at 2 mm from the closest inferior m evidence of atypical lymphoid infiltrates or metastatic carcinoma is identified. · Immunohistochemical stain for cytokeratin AE1/AE3 (with appropriate control reactivity) is negative, supporting the above diagnosis.      Comment:  The patient had a previou 100 MG Oral Tab Take 100 mg by mouth daily. Disp:  Rfl:    Venlafaxine HCl ER (EFFEXOR XR) 150 MG Oral Capsule SR 24 Hr Take 1 capsule (150 mg total) by mouth daily.  Disp: 30 capsule Rfl: 0   topiramate 25 MG Oral Tab Take 1 tablet (25 mg total) by mouth 2 mother. PHYSICAL EXAM  Blood pressure (!) 155/94, pulse 103, temperature 98.4 °F (36.9 °C), temperature source Oral, resp. rate 16, weight 114.7 kg (252 lb 12.8 oz), SpO2 96 %, not currently breastfeeding.   PERFORMANCE STATUS  ,  PAIN  GENERAL:  Pt is a Hospital          Received:            10/29/2018 01:56 PM                               Operating Room                                                             Pathologist:           Curtis Goldstein MD measuring 6 mm in greatest dimension. · Previous biopsy site area is seen adjacent to main tumor area with infiltrating carcinoma and ductal carcinoma in situ.   · Remaining breast tissue with fibrocystic changes, multifocal calcification, and additional p the presence of multifocal high-grade ductal carcinoma in situ with small focal area of invasive ductal carcinoma, grade 2, measuring 4 mm in greatest dimension and seen at approximately 5 mm from the closest inferior margin.   Multifocal areas of ductal ca controls are examined and showed appropriate reactivity.   Electronically signed

## 2018-12-08 NOTE — TELEPHONE ENCOUNTER
Dr Colleen Raza, please review refill request.  Per Med Record pt is taking Venlafaxine, per office notes she is taking Sertraline. Please advise.

## 2018-12-08 NOTE — TELEPHONE ENCOUNTER
Refill Protocol Appointment Criteria  · Appointment scheduled in the past 6 months or in the next 3 months  Recent Outpatient Visits            2 days ago     Reinaldo Sarah MD    Office Visit    2 weeks ago [facility] the [place]

## 2018-12-09 RX ORDER — VENLAFAXINE HYDROCHLORIDE 150 MG/1
CAPSULE, EXTENDED RELEASE ORAL
Qty: 90 CAPSULE | Refills: 0 | Status: SHIPPED | OUTPATIENT
Start: 2018-12-09 | End: 2019-01-05

## 2018-12-10 PROCEDURE — 77290 THER RAD SIMULAJ FIELD CPLX: CPT | Performed by: RADIOLOGY

## 2018-12-10 PROCEDURE — 77334 RADIATION TREATMENT AID(S): CPT | Performed by: RADIOLOGY

## 2018-12-12 RX ORDER — VENLAFAXINE HYDROCHLORIDE 150 MG/1
CAPSULE, EXTENDED RELEASE ORAL
Qty: 90 CAPSULE | Refills: 0 | OUTPATIENT
Start: 2018-12-12

## 2018-12-13 RX ORDER — TOPIRAMATE 25 MG/1
TABLET ORAL
Qty: 60 TABLET | Refills: 1 | Status: SHIPPED | OUTPATIENT
Start: 2018-12-13 | End: 2018-12-24

## 2018-12-13 NOTE — TELEPHONE ENCOUNTER
[id number] - [place], [place] - [facility] [address]  AT [address] [place] [address] 59, [phone], [phone]   Medication Detail      Disp Refills Start End    VENLAFAXINE HCL  MG Oral Capsule SR 24 Hr 90 capsule 0 12/9/2018     Sig: TAKE 1 CAPSULE B

## 2018-12-14 PROCEDURE — 77295 3-D RADIOTHERAPY PLAN: CPT | Performed by: RADIOLOGY

## 2018-12-14 PROCEDURE — 77334 RADIATION TREATMENT AID(S): CPT | Performed by: RADIOLOGY

## 2018-12-14 PROCEDURE — 77300 RADIATION THERAPY DOSE PLAN: CPT | Performed by: RADIOLOGY

## 2018-12-14 RX ORDER — ALPRAZOLAM 0.25 MG/1
0.25 TABLET ORAL NIGHTLY PRN
Qty: 30 TABLET | Refills: 0 | OUTPATIENT
Start: 2018-12-14 | End: 2019-02-16

## 2018-12-14 NOTE — TELEPHONE ENCOUNTER
Leigh Hernandez, for Dr. Amanda Stevens, please advise on refill. Patient asking for refill for anxiety medicine, last script 10/14/18, she says she needs it today please.     Please respond to pool: EM FM ADO LPN/CMA    Refill Protocol Appointment Criteria  · Appoint

## 2018-12-18 ENCOUNTER — TELEPHONE (OUTPATIENT)
Dept: OTHER | Age: 48
End: 2018-12-18

## 2018-12-18 RX ORDER — CYCLOBENZAPRINE HCL 10 MG
10 TABLET ORAL 3 TIMES DAILY PRN
Qty: 30 TABLET | Refills: 0 | Status: SHIPPED | OUTPATIENT
Start: 2018-12-18 | End: 2019-01-05 | Stop reason: ALTCHOICE

## 2018-12-18 NOTE — TELEPHONE ENCOUNTER
Spoke with patient--requesting muscle relaxer to pharmacy--\"I threw my back out this morning. I've been sick lately and between the coughing fits and bending over to pick something up, I hurt my back. She sent in a prescription before when this happened. \

## 2018-12-19 ENCOUNTER — TELEPHONE (OUTPATIENT)
Dept: OTHER | Age: 48
End: 2018-12-19

## 2018-12-19 NOTE — TELEPHONE ENCOUNTER
Sister Michelle Melton calling and states that MD discontinued Zoloft and was replaced with another medication but not working at all, patient is a breast cancer and requesting either put her back to Zoloft or change her medication. Sister not CHRIS.  States that she

## 2018-12-20 NOTE — TELEPHONE ENCOUNTER
I called patient to follow-up and see how patient was doing with the switch in medication. Left a message to call back regarding progress.

## 2018-12-21 NOTE — TELEPHONE ENCOUNTER
Dr Beverly Pacheco for Dr Diane Alaniz, please advise. See message below (Birdie's summary). Patient tried the flexeril, did not help. Redaya Situ is on med list, but she does not take it, it was for surgery. She is asking for something to help, muscle relaxant.

## 2018-12-21 NOTE — TELEPHONE ENCOUNTER
Dr Jordan Howard, please note. Patient has not yet switched the medication, she will after Stepan day. Current medication is working so well that she wanted to wait until after the holiday, she will on Wednesday.

## 2018-12-26 PROCEDURE — 77412 RADIATION TX DELIVERY LVL 3: CPT | Performed by: RADIOLOGY

## 2018-12-26 PROCEDURE — 77280 THER RAD SIMULAJ FIELD SMPL: CPT | Performed by: RADIOLOGY

## 2018-12-27 ENCOUNTER — OFFICE VISIT (OUTPATIENT)
Dept: SURGERY | Facility: CLINIC | Age: 48
End: 2018-12-27
Payer: COMMERCIAL

## 2018-12-27 VITALS
BODY MASS INDEX: 41.13 KG/M2 | RESPIRATION RATE: 16 BRPM | WEIGHT: 259 LBS | DIASTOLIC BLOOD PRESSURE: 88 MMHG | HEIGHT: 66.5 IN | HEART RATE: 85 BPM | SYSTOLIC BLOOD PRESSURE: 137 MMHG

## 2018-12-27 DIAGNOSIS — E53.8 VITAMIN B12 DEFICIENCY: ICD-10-CM

## 2018-12-27 DIAGNOSIS — R53.82 CHRONIC FATIGUE: ICD-10-CM

## 2018-12-27 DIAGNOSIS — E55.9 VITAMIN D DEFICIENCY: ICD-10-CM

## 2018-12-27 DIAGNOSIS — E66.01 MORBID OBESITY WITH BMI OF 40.0-44.9, ADULT (HCC): ICD-10-CM

## 2018-12-27 DIAGNOSIS — E78.2 HYPERCHOLESTEROLEMIA WITH HYPERTRIGLYCERIDEMIA: Primary | ICD-10-CM

## 2018-12-27 PROCEDURE — 99214 OFFICE O/P EST MOD 30 MIN: CPT | Performed by: INTERNAL MEDICINE

## 2018-12-27 PROCEDURE — 77412 RADIATION TX DELIVERY LVL 3: CPT | Performed by: RADIOLOGY

## 2018-12-27 RX ORDER — TOPIRAMATE 25 MG/1
25 TABLET ORAL 2 TIMES DAILY
Qty: 180 TABLET | Refills: 0 | Status: SHIPPED | OUTPATIENT
Start: 2018-12-27 | End: 2019-12-17 | Stop reason: ALTCHOICE

## 2018-12-28 PROCEDURE — 77336 RADIATION PHYSICS CONSULT: CPT | Performed by: RADIOLOGY

## 2018-12-28 PROCEDURE — 77331 SPECIAL RADIATION DOSIMETRY: CPT | Performed by: RADIOLOGY

## 2018-12-28 PROCEDURE — 77412 RADIATION TX DELIVERY LVL 3: CPT | Performed by: RADIOLOGY

## 2018-12-28 NOTE — TELEPHONE ENCOUNTER
Unable to leave voicemail (mailbox not set up), please transfer to Triage RN if pt returns call. Thank you.

## 2018-12-28 NOTE — PROGRESS NOTES
[address] [place] [address],[address]  Dept: [phone]       Patient:  Olvin Gorman  :      1970  MRN:      [id number]    Chief Complaint:  Patient presents HYDROcodone-acetaminophen 5-325 MG Oral Tab Take 1-2 tablets by mouth every 6 (six) hours as needed for Pain. Disp: 35 tablet Rfl: 0   Sertraline HCl 100 MG Oral Tab Take 100 mg by mouth daily.  Disp:  Rfl:    hydrochlorothiazide 12.5 MG Oral Cap Take 1 c file    Surgical History:    Past Surgical History:   Procedure Laterality Date   • BENIGN BIOPSY LEFT     • BENIGN BIOPSY RIGHT     • BREAST BIOPSY NEEDLE LOCALIZATION Left 10/29/2018    Performed by Birdie Buitrago MD at [facility] OR   • Reuben  22. intake, Identify triggers for eating and manage cues and Eat slowly and take 20 to 30 minutes to complete each meal    Exercise Goals Reviewed and Discussed    Needs to start walking    ROS:    Constitutional: positive for fatigue  Respiratory: positive fo Vyvanse  Refill at current dose    Tolerating topiramate, helps with cravings    Currently going through radiation therapy at [facility]      Diagnoses and all orders for this visit:    Hypercholesterolemia with hypertriglyceridemia    Morbid obesity with BM

## 2018-12-28 NOTE — TELEPHONE ENCOUNTER
Spoke with patient who reports due to the back pain she went to the ER on 12/25/18 and was prescribed prednisone, Tylenol #3, and diazepam. Patient reports the prednisone has helped and she will be seeing a chiropractor tomorrow 12/29/18.  Patient is reques

## 2018-12-29 NOTE — TELEPHONE ENCOUNTER
Pt informed of  MD recommendation, pt stated understanding. To reception staff, pls add pt on 1-5-19 at 9:30 am as I'm unable to double book, thanks. Laura Pearl

## 2018-12-31 ENCOUNTER — APPOINTMENT (OUTPATIENT)
Dept: RADIATION ONCOLOGY | Facility: HOSPITAL | Age: 48
End: 2018-12-31
Attending: RADIOLOGY
Payer: COMMERCIAL

## 2019-01-01 ENCOUNTER — APPOINTMENT (OUTPATIENT)
Dept: RADIATION ONCOLOGY | Facility: HOSPITAL | Age: 49
End: 2019-01-01
Attending: RADIOLOGY
Payer: COMMERCIAL

## 2019-01-02 ENCOUNTER — OFFICE VISIT (OUTPATIENT)
Dept: RADIATION ONCOLOGY | Facility: HOSPITAL | Age: 49
End: 2019-01-02
Attending: RADIOLOGY
Payer: COMMERCIAL

## 2019-01-02 PROCEDURE — 77412 RADIATION TX DELIVERY LVL 3: CPT | Performed by: RADIOLOGY

## 2019-01-02 NOTE — PROGRESS NOTES
[facility] Radiation Treatment Management Note 1-5    Patient:  Kristen Webb  Age:  53year old  Visit Diagnosis:  No diagnosis found.   Primary Rad/Onc:  Dr. Nora Alejandre Cross    Site Delivered Dose (Gy) Prescribed Dose (Gy) Fraction #

## 2019-01-03 PROCEDURE — 77412 RADIATION TX DELIVERY LVL 3: CPT | Performed by: RADIOLOGY

## 2019-01-04 PROCEDURE — 77336 RADIATION PHYSICS CONSULT: CPT | Performed by: RADIOLOGY

## 2019-01-04 PROCEDURE — 77417 THER RADIOLOGY PORT IMAGE(S): CPT | Performed by: RADIOLOGY

## 2019-01-04 PROCEDURE — 77412 RADIATION TX DELIVERY LVL 3: CPT | Performed by: RADIOLOGY

## 2019-01-05 ENCOUNTER — OFFICE VISIT (OUTPATIENT)
Dept: FAMILY MEDICINE CLINIC | Facility: CLINIC | Age: 49
End: 2019-01-05
Payer: COMMERCIAL

## 2019-01-05 VITALS
DIASTOLIC BLOOD PRESSURE: 92 MMHG | SYSTOLIC BLOOD PRESSURE: 142 MMHG | HEIGHT: 66.5 IN | HEART RATE: 91 BPM | WEIGHT: 259 LBS | BODY MASS INDEX: 41.13 KG/M2 | TEMPERATURE: 98 F

## 2019-01-05 DIAGNOSIS — R03.0 ELEVATED BLOOD PRESSURE READING: ICD-10-CM

## 2019-01-05 DIAGNOSIS — F32.A ANXIETY AND DEPRESSION: ICD-10-CM

## 2019-01-05 DIAGNOSIS — E66.01 MORBID OBESITY WITH BMI OF 40.0-44.9, ADULT (HCC): ICD-10-CM

## 2019-01-05 DIAGNOSIS — C50.412 MALIGNANT NEOPLASM OF UPPER-OUTER QUADRANT OF LEFT BREAST IN FEMALE, ESTROGEN RECEPTOR POSITIVE (HCC): Primary | ICD-10-CM

## 2019-01-05 DIAGNOSIS — S33.5XXD SPRAIN OF LOW BACK, SUBSEQUENT ENCOUNTER: ICD-10-CM

## 2019-01-05 DIAGNOSIS — Z17.0 MALIGNANT NEOPLASM OF UPPER-OUTER QUADRANT OF LEFT BREAST IN FEMALE, ESTROGEN RECEPTOR POSITIVE (HCC): Primary | ICD-10-CM

## 2019-01-05 DIAGNOSIS — F41.9 ANXIETY AND DEPRESSION: ICD-10-CM

## 2019-01-05 PROCEDURE — 99212 OFFICE O/P EST SF 10 MIN: CPT | Performed by: FAMILY MEDICINE

## 2019-01-05 PROCEDURE — 99214 OFFICE O/P EST MOD 30 MIN: CPT | Performed by: FAMILY MEDICINE

## 2019-01-05 RX ORDER — VENLAFAXINE HYDROCHLORIDE 150 MG/1
150 CAPSULE, EXTENDED RELEASE ORAL
Qty: 90 CAPSULE | Refills: 0 | Status: SHIPPED | OUTPATIENT
Start: 2019-01-05 | End: 2019-02-16

## 2019-01-05 RX ORDER — METAXALONE 800 MG/1
800 TABLET ORAL 3 TIMES DAILY
Qty: 60 TABLET | Refills: 0 | Status: SHIPPED | OUTPATIENT
Start: 2019-01-05 | End: 2019-04-18 | Stop reason: ALTCHOICE

## 2019-01-05 NOTE — PROGRESS NOTES
HPI:    Patient ID: Racquel Peace is a 53year old female. HPI   Patient presents with:  ER F/U: lower back pain pt states it feels better     Patient here following up from the ER.   She states she had thrown out her back and feels her back pain is Current Outpatient Medications:  Metaxalone 800 MG Oral Tab Take 1 tablet (800 mg total) by mouth 3 (three) times daily. Disp: 60 tablet Rfl: 0   Venlafaxine HCl  MG Oral Capsule SR 24 Hr Take 1 capsule (150 mg total) by mouth once daily.  Disp: 9 low-salt diet. Recommend follow-up in 1 month    5. Sprain of low back, subsequent encounter  Refilled muscle relaxer to be taken as needed. If no better will get x-ray of the back. No orders of the defined types were placed in this encounter.

## 2019-01-07 ENCOUNTER — OFFICE VISIT (OUTPATIENT)
Dept: RADIATION ONCOLOGY | Facility: HOSPITAL | Age: 49
End: 2019-01-07
Attending: RADIOLOGY
Payer: COMMERCIAL

## 2019-01-07 VITALS
RESPIRATION RATE: 16 BRPM | DIASTOLIC BLOOD PRESSURE: 88 MMHG | HEART RATE: 81 BPM | TEMPERATURE: 98 F | SYSTOLIC BLOOD PRESSURE: 135 MMHG

## 2019-01-07 PROCEDURE — 77412 RADIATION TX DELIVERY LVL 3: CPT | Performed by: RADIOLOGY

## 2019-01-07 NOTE — PROGRESS NOTES
[facility] Radiation Treatment Management Note 6-10    Patient:  Yohan Macias  Age:  53year old  Visit Diagnosis:  No diagnosis found.   Primary Rad/Onc:  Dr. Alireza Adkins Cross    Site Delivered Dose (Gy) Prescribed Dose (Gy) Fraction #

## 2019-01-08 PROCEDURE — 77412 RADIATION TX DELIVERY LVL 3: CPT | Performed by: RADIOLOGY

## 2019-01-09 ENCOUNTER — NURSE TRIAGE (OUTPATIENT)
Dept: OTHER | Age: 49
End: 2019-01-09

## 2019-01-09 DIAGNOSIS — R30.0 DYSURIA: Primary | ICD-10-CM

## 2019-01-09 PROCEDURE — 77412 RADIATION TX DELIVERY LVL 3: CPT | Performed by: RADIOLOGY

## 2019-01-09 RX ORDER — SULFAMETHOXAZOLE AND TRIMETHOPRIM 800; 160 MG/1; MG/1
1 TABLET ORAL 2 TIMES DAILY
Qty: 6 TABLET | Refills: 0 | Status: SHIPPED | OUTPATIENT
Start: 2019-01-09 | End: 2019-01-12

## 2019-01-09 NOTE — TELEPHONE ENCOUNTER
Action Requested: Summary for Provider     []  Critical Lab, Recommendations Needed  [] Need Additional Advice  []   FYI    []   Need Orders  [] Need Medications Sent to Pharmacy  []  Other     SUMMARY:Pt requesting RX-s/s burning with urination,odor, freq

## 2019-01-09 NOTE — TELEPHONE ENCOUNTER
Will send in antibiotics. Please have patient to give us a urine sample at the lab. If no better recommend follow-up in the clinic.

## 2019-01-10 PROCEDURE — 77412 RADIATION TX DELIVERY LVL 3: CPT | Performed by: RADIOLOGY

## 2019-01-10 PROCEDURE — 77307 TELETHX ISODOSE PLAN CPLX: CPT | Performed by: RADIOLOGY

## 2019-01-10 PROCEDURE — 77334 RADIATION TREATMENT AID(S): CPT | Performed by: RADIOLOGY

## 2019-01-10 PROCEDURE — 77290 THER RAD SIMULAJ FIELD CPLX: CPT | Performed by: RADIOLOGY

## 2019-01-11 ENCOUNTER — APPOINTMENT (OUTPATIENT)
Dept: LAB | Facility: HOSPITAL | Age: 49
End: 2019-01-11
Attending: FAMILY MEDICINE
Payer: COMMERCIAL

## 2019-01-11 LAB
BACTERIA UR QL AUTO: NEGATIVE /HPF
BILIRUB UR QL: NEGATIVE
CLARITY UR: CLEAR
COLOR UR: YELLOW
GLUCOSE UR-MCNC: NEGATIVE MG/DL
KETONES UR-MCNC: NEGATIVE MG/DL
LEUKOCYTE ESTERASE UR QL STRIP.AUTO: NEGATIVE
NITRITE UR QL STRIP.AUTO: POSITIVE
PH UR: 7 [PH] (ref 5–8)
PROT UR-MCNC: 30 MG/DL
RBC #/AREA URNS AUTO: 10 /HPF
SP GR UR STRIP: 1.02 (ref 1–1.03)
UROBILINOGEN UR STRIP-ACNC: <2
VIT C UR-MCNC: NEGATIVE MG/DL
WBC #/AREA URNS AUTO: 2 /HPF

## 2019-01-11 PROCEDURE — 81001 URINALYSIS AUTO W/SCOPE: CPT | Performed by: FAMILY MEDICINE

## 2019-01-11 PROCEDURE — 77412 RADIATION TX DELIVERY LVL 3: CPT | Performed by: RADIOLOGY

## 2019-01-11 PROCEDURE — 77417 THER RADIOLOGY PORT IMAGE(S): CPT | Performed by: RADIOLOGY

## 2019-01-11 PROCEDURE — 77336 RADIATION PHYSICS CONSULT: CPT | Performed by: RADIOLOGY

## 2019-01-14 ENCOUNTER — OFFICE VISIT (OUTPATIENT)
Dept: RADIATION ONCOLOGY | Facility: HOSPITAL | Age: 49
End: 2019-01-14
Attending: RADIOLOGY
Payer: COMMERCIAL

## 2019-01-14 VITALS
RESPIRATION RATE: 18 BRPM | DIASTOLIC BLOOD PRESSURE: 91 MMHG | HEART RATE: 80 BPM | SYSTOLIC BLOOD PRESSURE: 136 MMHG | TEMPERATURE: 98 F

## 2019-01-14 PROCEDURE — 77412 RADIATION TX DELIVERY LVL 3: CPT | Performed by: RADIOLOGY

## 2019-01-14 NOTE — PROGRESS NOTES
[facility] Radiation Treatment Management Note 11-15    Patient:  Nicolás Armstrong  Age:  53year old  Visit Diagnosis:  No diagnosis found.   Primary Rad/Onc:  Dr. Denis Marquis Cross    Site Delivered Dose (Gy) Prescribed Dose (Gy) Fraction #

## 2019-01-15 PROCEDURE — 77412 RADIATION TX DELIVERY LVL 3: CPT | Performed by: RADIOLOGY

## 2019-01-16 PROCEDURE — 77412 RADIATION TX DELIVERY LVL 3: CPT | Performed by: RADIOLOGY

## 2019-01-17 ENCOUNTER — HOSPITAL ENCOUNTER (EMERGENCY)
Facility: HOSPITAL | Age: 49
Discharge: HOME | End: 2019-01-17
Attending: EMERGENCY MEDICINE
Payer: COMMERCIAL

## 2019-01-17 VITALS
SYSTOLIC BLOOD PRESSURE: 124 MMHG | RESPIRATION RATE: 16 BRPM | BODY MASS INDEX: 39.24 KG/M2 | OXYGEN SATURATION: 96 % | TEMPERATURE: 98 F | HEART RATE: 93 BPM | WEIGHT: 250 LBS | DIASTOLIC BLOOD PRESSURE: 80 MMHG | HEIGHT: 67 IN

## 2019-01-17 DIAGNOSIS — L50.9 URTICARIA: Primary | ICD-10-CM

## 2019-01-17 LAB
ANION GAP SERPL CALC-SCNC: 8 MMOL/L (ref 0–18)
BASOPHILS # BLD: 0 K/UL (ref 0–0.2)
BASOPHILS NFR BLD: 0 %
BUN SERPL-MCNC: 7 MG/DL (ref 8–20)
BUN/CREAT SERPL: 11.3 (ref 10–20)
CALCIUM SERPL-MCNC: 8.5 MG/DL (ref 8.5–10.5)
CHLORIDE SERPL-SCNC: 105 MMOL/L (ref 95–110)
CO2 SERPL-SCNC: 21 MMOL/L (ref 22–32)
CREAT SERPL-MCNC: 0.62 MG/DL (ref 0.5–1.5)
EOSINOPHIL # BLD: 0.1 K/UL (ref 0–0.7)
EOSINOPHIL NFR BLD: 1 %
ERYTHROCYTE [DISTWIDTH] IN BLOOD BY AUTOMATED COUNT: 14.8 % (ref 11–15)
GLUCOSE SERPL-MCNC: 90 MG/DL (ref 70–99)
HCT VFR BLD AUTO: 39 % (ref 35–48)
HGB BLD-MCNC: 13.1 G/DL (ref 12–16)
LYMPHOCYTES # BLD: 1.3 K/UL (ref 1–4)
LYMPHOCYTES NFR BLD: 13 %
MCH RBC QN AUTO: 29 PG (ref 27–32)
MCHC RBC AUTO-ENTMCNC: 33.6 G/DL (ref 32–37)
MCV RBC AUTO: 86.2 FL (ref 80–100)
MONOCYTES # BLD: 0.4 K/UL (ref 0–1)
MONOCYTES NFR BLD: 5 %
NEUTROPHILS # BLD AUTO: 8 K/UL (ref 1.8–7.7)
NEUTROPHILS NFR BLD: 81 %
OSMOLALITY UR CALC.SUM OF ELEC: 276 MOSM/KG (ref 275–295)
PLATELET # BLD AUTO: 329 K/UL (ref 140–400)
PMV BLD AUTO: 6.6 FL (ref 7.4–10.3)
POTASSIUM SERPL-SCNC: 4 MMOL/L (ref 3.3–5.1)
RBC # BLD AUTO: 4.53 M/UL (ref 3.7–5.4)
SODIUM SERPL-SCNC: 134 MMOL/L (ref 136–144)
WBC # BLD AUTO: 9.9 K/UL (ref 4–11)

## 2019-01-17 PROCEDURE — 77412 RADIATION TX DELIVERY LVL 3: CPT | Performed by: RADIOLOGY

## 2019-01-17 PROCEDURE — 80048 BASIC METABOLIC PNL TOTAL CA: CPT | Performed by: EMERGENCY MEDICINE

## 2019-01-17 PROCEDURE — 99284 EMERGENCY DEPT VISIT MOD MDM: CPT

## 2019-01-17 PROCEDURE — 85025 COMPLETE CBC W/AUTO DIFF WBC: CPT | Performed by: EMERGENCY MEDICINE

## 2019-01-17 PROCEDURE — 96374 THER/PROPH/DIAG INJ IV PUSH: CPT

## 2019-01-17 RX ORDER — DIPHENHYDRAMINE HYDROCHLORIDE 50 MG/ML
25 INJECTION INTRAMUSCULAR; INTRAVENOUS ONCE
Status: COMPLETED | OUTPATIENT
Start: 2019-01-17 | End: 2019-01-17

## 2019-01-17 RX ORDER — PREDNISONE 20 MG/1
60 TABLET ORAL ONCE
Status: COMPLETED | OUTPATIENT
Start: 2019-01-17 | End: 2019-01-17

## 2019-01-17 RX ORDER — PREDNISONE 20 MG/1
20 TABLET ORAL DAILY
Qty: 5 TABLET | Refills: 0 | Status: SHIPPED | OUTPATIENT
Start: 2019-01-17 | End: 2019-01-22

## 2019-01-17 NOTE — ED PROVIDER NOTES
Patient Seen in: [facility] Emergency Department    History   No chief complaint on file.     Stated Complaint: Hives/ rash currently under radiation     HPI    64-year-old female with history of breast cancer status post lumpectomy and presently r SURGERY       • TONSILLECTOMY             Social History    Tobacco Use      Smoking status: Former Smoker        Packs/day: 0.50        Years: 10.00        Pack years: 5        Types: Cigarettes        Quit date: 10/24/2001        Years since quittin (*)     All other components within normal limits   CBC W/ DIFFERENTIAL - Abnormal; Notable for the following components:    MPV 6.6 (*)     Neutrophil Absolute 8.0 (*)     All other components within normal limits   CBC WITH DIFFERENTIAL WITH PLATELET

## 2019-01-17 NOTE — ED NOTES
Patient 10+ treatments into radiation treatment. Rash started yesterday above left eye. Rash is now present throughout body, Rash is raised, reddened and itchy. Patient is worried about rash spreading to throat or affect her vision.

## 2019-01-17 NOTE — ED INITIAL ASSESSMENT (HPI)
Pt c/o rash all over body, itchy, puffiness around eyes since 5pm yesterday. Pt is currently in tx with radiation to left breast.  Pt states she is [facility] through tx and goes M-F since December. Pt sees Dr. Rani Fregoso and Yuliet Palacios.    Denies fever, denies n/v/d  Pt

## 2019-01-18 PROCEDURE — 77412 RADIATION TX DELIVERY LVL 3: CPT | Performed by: RADIOLOGY

## 2019-01-18 PROCEDURE — 77336 RADIATION PHYSICS CONSULT: CPT | Performed by: RADIOLOGY

## 2019-01-19 ENCOUNTER — TELEPHONE (OUTPATIENT)
Dept: FAMILY MEDICINE CLINIC | Facility: CLINIC | Age: 49
End: 2019-01-19

## 2019-01-19 DIAGNOSIS — T78.40XD ALLERGIC REACTION, SUBSEQUENT ENCOUNTER: Primary | ICD-10-CM

## 2019-01-19 DIAGNOSIS — C50.412 MALIGNANT NEOPLASM OF UPPER-OUTER QUADRANT OF LEFT BREAST IN FEMALE, ESTROGEN RECEPTOR POSITIVE (HCC): ICD-10-CM

## 2019-01-19 DIAGNOSIS — Z17.0 MALIGNANT NEOPLASM OF UPPER-OUTER QUADRANT OF LEFT BREAST IN FEMALE, ESTROGEN RECEPTOR POSITIVE (HCC): ICD-10-CM

## 2019-01-19 RX ORDER — EPINEPHRINE 0.3 MG/.3ML
0.3 INJECTION SUBCUTANEOUS ONCE
Qty: 1 EACH | Refills: 1 | Status: SHIPPED | OUTPATIENT
Start: 2019-01-19 | End: 2019-01-19

## 2019-01-19 RX ORDER — EPINEPHRINE 0.15 MG/.3ML
0.15 INJECTION INTRAMUSCULAR AS NEEDED
Qty: 1 EACH | Refills: 1 | Status: SHIPPED | OUTPATIENT
Start: 2019-01-19 | End: 2019-01-19 | Stop reason: DRUGHIGH

## 2019-01-19 RX ORDER — RANITIDINE 150 MG/1
150 TABLET ORAL 2 TIMES DAILY
Qty: 60 TABLET | Refills: 0 | Status: SHIPPED | OUTPATIENT
Start: 2019-01-19 | End: 2019-02-12

## 2019-01-19 NOTE — TELEPHONE ENCOUNTER
Dr Billie Haynes, please advise. [facility] pharmacist stated that the dose on the EPI pen of 0.15 is usually for person 66 lbs or less (child), and for adult the dose is usually 0.3. Pharmacy     [facility]/PHARMACY [address]  AT [facility]

## 2019-01-21 ENCOUNTER — OFFICE VISIT (OUTPATIENT)
Dept: RADIATION ONCOLOGY | Facility: HOSPITAL | Age: 49
End: 2019-01-21
Attending: RADIOLOGY
Payer: COMMERCIAL

## 2019-01-21 VITALS
TEMPERATURE: 98 F | DIASTOLIC BLOOD PRESSURE: 88 MMHG | SYSTOLIC BLOOD PRESSURE: 134 MMHG | HEART RATE: 79 BPM | RESPIRATION RATE: 18 BRPM

## 2019-01-21 DIAGNOSIS — C50.412 MALIGNANT NEOPLASM OF UPPER-OUTER QUADRANT OF LEFT BREAST IN FEMALE, ESTROGEN RECEPTOR POSITIVE (HCC): Primary | ICD-10-CM

## 2019-01-21 DIAGNOSIS — Z17.0 MALIGNANT NEOPLASM OF UPPER-OUTER QUADRANT OF LEFT BREAST IN FEMALE, ESTROGEN RECEPTOR POSITIVE (HCC): Primary | ICD-10-CM

## 2019-01-21 PROCEDURE — 77412 RADIATION TX DELIVERY LVL 3: CPT | Performed by: RADIOLOGY

## 2019-01-21 PROCEDURE — 77280 THER RAD SIMULAJ FIELD SMPL: CPT | Performed by: RADIOLOGY

## 2019-01-21 NOTE — PROGRESS NOTES
[facility] Radiation Treatment Management Note 16-20    Patient:  Josephine Henriquez  Age:  53year old  Visit Diagnosis:    1.  Malignant neoplasm of upper-outer quadrant of left breast in female, estrogen receptor positive ([facility] [address]

## 2019-01-22 ENCOUNTER — TELEPHONE (OUTPATIENT)
Dept: OTHER | Age: 49
End: 2019-01-22

## 2019-01-22 PROCEDURE — 77387 GUIDANCE FOR RADJ TX DLVR: CPT | Performed by: RADIOLOGY

## 2019-01-22 PROCEDURE — 77412 RADIATION TX DELIVERY LVL 3: CPT | Performed by: RADIOLOGY

## 2019-01-22 NOTE — TELEPHONE ENCOUNTER
Called patient back  Seen in ER for rash that is ongoing.   Switched from Effexor to zoloft Dec 25  Has appointment to see allergist in 2 days  Recommend keeping appointment  Unsure if related to effexor as rash started 3 weeks after starting medication   P

## 2019-01-22 NOTE — TELEPHONE ENCOUNTER
Pt was in ER for rash and hives. Pt believes it my be from the Effexor because it is the only new medication she is taking. Pt asking if she can discontinue Effexor for now. Pt has an appointment with allergist on Thursday.  Pt currently taking prednisone a

## 2019-01-23 PROCEDURE — 77387 GUIDANCE FOR RADJ TX DLVR: CPT | Performed by: RADIOLOGY

## 2019-01-23 PROCEDURE — 77412 RADIATION TX DELIVERY LVL 3: CPT | Performed by: RADIOLOGY

## 2019-01-24 ENCOUNTER — OFFICE VISIT (OUTPATIENT)
Dept: ALLERGY | Facility: CLINIC | Age: 49
End: 2019-01-24
Payer: COMMERCIAL

## 2019-01-24 ENCOUNTER — TELEPHONE (OUTPATIENT)
Dept: FAMILY MEDICINE CLINIC | Facility: CLINIC | Age: 49
End: 2019-01-24

## 2019-01-24 ENCOUNTER — DOCUMENTATION ONLY (OUTPATIENT)
Dept: RADIATION ONCOLOGY | Facility: HOSPITAL | Age: 49
End: 2019-01-24

## 2019-01-24 ENCOUNTER — LAB ENCOUNTER (OUTPATIENT)
Dept: LAB | Age: 49
End: 2019-01-24
Attending: ALLERGY & IMMUNOLOGY
Payer: COMMERCIAL

## 2019-01-24 VITALS
WEIGHT: 253.38 LBS | BODY MASS INDEX: 42.21 KG/M2 | HEART RATE: 87 BPM | HEIGHT: 65 IN | DIASTOLIC BLOOD PRESSURE: 84 MMHG | RESPIRATION RATE: 18 BRPM | SYSTOLIC BLOOD PRESSURE: 121 MMHG | TEMPERATURE: 99 F

## 2019-01-24 DIAGNOSIS — L50.8 ACUTE URTICARIA: Primary | ICD-10-CM

## 2019-01-24 DIAGNOSIS — L50.8 ACUTE URTICARIA: ICD-10-CM

## 2019-01-24 LAB
BASOPHILS # BLD: 0.1 K/UL (ref 0–0.2)
BASOPHILS NFR BLD: 1 %
C4 SERPL-MCNC: 22 MG/DL (ref 18–55)
EOSINOPHIL # BLD: 0.1 K/UL (ref 0–0.7)
EOSINOPHIL NFR BLD: 1 %
ERYTHROCYTE [DISTWIDTH] IN BLOOD BY AUTOMATED COUNT: 15.1 % (ref 11–15)
ERYTHROCYTE [SEDIMENTATION RATE] IN BLOOD: 11 MM/HR (ref 0–20)
HCT VFR BLD AUTO: 43.4 % (ref 35–48)
HGB BLD-MCNC: 14.2 G/DL (ref 12–16)
LYMPHOCYTES # BLD: 2.3 K/UL (ref 1–4)
LYMPHOCYTES NFR BLD: 21 %
MCH RBC QN AUTO: 28.8 PG (ref 27–32)
MCHC RBC AUTO-ENTMCNC: 32.8 G/DL (ref 32–37)
MCV RBC AUTO: 87.8 FL (ref 80–100)
MONOCYTES # BLD: 0.6 K/UL (ref 0–1)
MONOCYTES NFR BLD: 5 %
NEUTROPHILS # BLD AUTO: 8 K/UL (ref 1.8–7.7)
NEUTROPHILS NFR BLD: 73 %
PLATELET # BLD AUTO: 472 K/UL (ref 140–400)
PMV BLD AUTO: 6.9 FL (ref 7.4–10.3)
RBC # BLD AUTO: 4.94 M/UL (ref 3.7–5.4)
WBC # BLD AUTO: 11 K/UL (ref 4–11)

## 2019-01-24 PROCEDURE — 86160 COMPLEMENT ANTIGEN: CPT

## 2019-01-24 PROCEDURE — 99244 OFF/OP CNSLTJ NEW/EST MOD 40: CPT | Performed by: ALLERGY & IMMUNOLOGY

## 2019-01-24 PROCEDURE — 82785 ASSAY OF IGE: CPT | Performed by: ALLERGY & IMMUNOLOGY

## 2019-01-24 PROCEDURE — 99212 OFFICE O/P EST SF 10 MIN: CPT | Performed by: ALLERGY & IMMUNOLOGY

## 2019-01-24 PROCEDURE — 77387 GUIDANCE FOR RADJ TX DLVR: CPT | Performed by: RADIOLOGY

## 2019-01-24 PROCEDURE — 85025 COMPLETE CBC W/AUTO DIFF WBC: CPT

## 2019-01-24 PROCEDURE — 77412 RADIATION TX DELIVERY LVL 3: CPT | Performed by: RADIOLOGY

## 2019-01-24 PROCEDURE — 96372 THER/PROPH/DIAG INJ SC/IM: CPT | Performed by: ALLERGY & IMMUNOLOGY

## 2019-01-24 PROCEDURE — 36415 COLL VENOUS BLD VENIPUNCTURE: CPT

## 2019-01-24 PROCEDURE — 85652 RBC SED RATE AUTOMATED: CPT

## 2019-01-24 PROCEDURE — 86003 ALLG SPEC IGE CRUDE XTRC EA: CPT | Performed by: ALLERGY & IMMUNOLOGY

## 2019-01-24 RX ORDER — PREDNISONE 10 MG/1
TABLET ORAL
Qty: 38 TABLET | Refills: 0 | Status: SHIPPED | OUTPATIENT
Start: 2019-01-24 | End: 2019-02-16 | Stop reason: ALTCHOICE

## 2019-01-24 RX ORDER — METHYLPREDNISOLONE ACETATE 80 MG/ML
80 INJECTION, SUSPENSION INTRA-ARTICULAR; INTRALESIONAL; INTRAMUSCULAR; SOFT TISSUE ONCE
Status: COMPLETED | OUTPATIENT
Start: 2019-01-24 | End: 2019-01-24

## 2019-01-24 RX ORDER — LEVOCETIRIZINE DIHYDROCHLORIDE 5 MG/1
5 TABLET, FILM COATED ORAL EVERY 12 HOURS PRN
Qty: 60 TABLET | Refills: 0 | Status: SHIPPED | OUTPATIENT
Start: 2019-01-24 | End: 2019-09-25

## 2019-01-24 RX ADMIN — METHYLPREDNISOLONE ACETATE 80 MG: 80 INJECTION, SUSPENSION INTRA-ARTICULAR; INTRALESIONAL; INTRAMUSCULAR; SOFT TISSUE at 10:34:00

## 2019-01-24 NOTE — PROGRESS NOTES
Pt completed radiation this am. Moderate fatigue Grade 1 erythema with healing dry desquamation L axilla. Pt presents today with generalized hives with pruritis. Seeing an allergist this am. No respiratory compromise. Seen by Dr Litzy Siegel this week.  Notified Dr Valentine العلي

## 2019-01-24 NOTE — PATIENT INSTRUCTIONS
Recs: Handouts on acute urticaria provided and reviewed including most episode resolving within 4-6 weeks of onset  Depo-Medrol 80 mg given IM in office today  Start Xyzal, levocetirizine 5 mg once at night at bedtime up to every 12 hours if needed  Zantac

## 2019-01-24 NOTE — PROGRESS NOTES
Mary Huffman is a 53year old female. HPI:   Patient presents with: Allergies: Referred by Dr. Jen Esteves, presents with itchy rash on bilateral arms, lower extremities and abd x 1wk.  States eyes swelled up. was given Benadryl and Prednisone in ER, sta Last benadryl was last night       HISTORY:  Past Medical History:   Diagnosis Date   • Anxiety state, unspecified    • Breast mass, left     has had mammo, ultrasound, biopsy   • Cancer ([facility] [address] 10/29/2018    left breast   • Depression    • Hypercholeste mouth 3 (three) times daily. Disp: 60 tablet Rfl: 0   Venlafaxine HCl  MG Oral Capsule SR 24 Hr Take 1 capsule (150 mg total) by mouth once daily.  Disp: 90 capsule Rfl: 0   topiramate 25 MG Oral Tab Take 1 tablet (25 mg total) by mouth 2 (two) times cervical, supraclavicular or axillary adenopathy is noted  Respiratory: normal to inspection lungs are clear to auscultation bilaterally normal respiratory effort   Cardiovascular: regular rate and rhythm no murmurs, gallups, or rubs  Abdomen: soft non-ten self administration of these medications. Teaching, instruction and sample was provided to the patient by myself. Teaching included  a review of potential adverse side effects as well as potential efficacy.   Patient's questions were answered in regards t

## 2019-01-24 NOTE — TELEPHONE ENCOUNTER
Called pharmacy and spoke to pharmacist Mk. He confirmed the clarification as BID dosing. Will prepare for .

## 2019-01-24 NOTE — PROGRESS NOTES
RADIATION ONCOLOGY COMPLETION SUMMARY NOTE    DIAGNOSIS :  Stage IA pT1a, NO(sn)M0, G2, ER: Positive, MD: Positive, HER2: Negative), s/p lumpectomy s/p adjuvant XRT on 1/24/2019.     Dear Jen Chavez and colleagues,    As you recall, pt is a 53 fe skin reactions to take 1-2 weeks to heal.      Of note, towards the end of tx, she did develop generalized hives/rash of unclear etiology and she will f/u with allergist and Dr. Nubia Andrade shortly.      Pt will continue to follow closely with Dr. Marni Stephens for long ter

## 2019-01-24 NOTE — TELEPHONE ENCOUNTER
•  Levocetirizine Dihydrochloride (XYZAL) 5 MG Oral Tab, Take 1 tablet (5 mg total) by mouth every 12 (twelve) hours as needed for Allergies. , Disp: 60 tablet, Rfl: 0      Script Clarification: Dose seems high, recommended dose is once a day

## 2019-01-25 PROCEDURE — 77336 RADIATION PHYSICS CONSULT: CPT | Performed by: RADIOLOGY

## 2019-01-29 LAB
A ALTERNATA IGE QN: <0.1 KUA/L (ref ?–0.1)
A FUMIGATUS IGE QN: 0.14 KUA/L (ref ?–0.1)
AMER SYCAMORE IGE QN: <0.1 KUA/L (ref ?–0.1)
BERMUDA GRASS IGE QN: 0.21 KUA/L (ref ?–0.1)
BOXELDER IGE QN: 0.19 KUA/L (ref ?–0.1)
C HERBARUM IGE QN: <0.1 KUA/L (ref ?–0.1)
CALIF WALNUT IGE QN: 0.18 KUA/L (ref ?–0.1)
CAT DANDER IGE QN: 0.6 KUA/L (ref ?–0.1)
CMN PIGWEED IGE QN: <0.1 KUA/L (ref ?–0.1)
COMMON RAGWEED IGE QN: 0.19 KUA/L (ref ?–0.1)
COTTONWOOD IGE QN: <0.1 KUA/L (ref ?–0.1)
D FARINAE IGE QN: 0.19 KUA/L (ref ?–0.1)
D PTERONYSS IGE QN: 0.19 KUA/L (ref ?–0.1)
DOG DANDER IGE QN: 0.8 KUA/L (ref ?–0.1)
IGE SERPL-ACNC: 188 KU/L (ref 2–214)
M RACEMOSUS IGE QN: <0.1 KUA/L (ref ?–0.1)
MARSH ELDER IGE QN: <0.1 KUA/L (ref ?–0.1)
MOUSE EPITH IGE QN: <0.1 KUA/L (ref ?–0.1)
MT JUNIPER IGE QN: <0.1 KUA/L (ref ?–0.1)
P NOTATUM IGE QN: <0.1 KUA/L (ref ?–0.1)
PECAN/HICK TREE IGE QN: 0.15 KUA/L (ref ?–0.1)
ROACH IGE QN: <0.1 KUA/L (ref ?–0.1)
SALTWORT IGE QN: <0.1 KUA/L (ref ?–0.1)
TIMOTHY IGE QN: 0.54 KUA/L (ref ?–0.1)
WHITE ASH IGE QN: <0.1 KUA/L (ref ?–0.1)
WHITE ELM IGE QN: <0.1 KUA/L (ref ?–0.1)
WHITE MULBERRY IGE QN: <0.1 KUA/L (ref ?–0.1)
WHITE OAK IGE QN: <0.1 KUA/L (ref ?–0.1)

## 2019-02-01 ENCOUNTER — NURSE TRIAGE (OUTPATIENT)
Dept: OTHER | Age: 49
End: 2019-02-01

## 2019-02-01 NOTE — TELEPHONE ENCOUNTER
Action Requested: Summary for Provider     []  Critical Lab, Recommendations Needed  [x] Need Additional Advice  []   FYI    []   Need Orders  [] Need Medications Sent to Pharmacy  []  Other     SUMMARY: Pt seen Dr Yasir Loco on 1/24/19 for urticaria and prescr complaint               Reason for Disposition  • Patient wants to be seen    Protocols used: HIVES-A-OH

## 2019-02-02 ENCOUNTER — OFFICE VISIT (OUTPATIENT)
Dept: ALLERGY | Facility: CLINIC | Age: 49
End: 2019-02-02
Payer: COMMERCIAL

## 2019-02-02 VITALS
HEART RATE: 103 BPM | OXYGEN SATURATION: 97 % | SYSTOLIC BLOOD PRESSURE: 160 MMHG | DIASTOLIC BLOOD PRESSURE: 92 MMHG | TEMPERATURE: 100 F | RESPIRATION RATE: 20 BRPM

## 2019-02-02 DIAGNOSIS — L50.8 ACUTE URTICARIA: Primary | ICD-10-CM

## 2019-02-02 PROCEDURE — 99212 OFFICE O/P EST SF 10 MIN: CPT | Performed by: ALLERGY & IMMUNOLOGY

## 2019-02-02 PROCEDURE — 99214 OFFICE O/P EST MOD 30 MIN: CPT | Performed by: ALLERGY & IMMUNOLOGY

## 2019-02-02 RX ORDER — PREDNISONE 10 MG/1
TABLET ORAL
Qty: 38 TABLET | Refills: 0 | Status: SHIPPED | OUTPATIENT
Start: 2019-02-02 | End: 2019-02-16 | Stop reason: ALTCHOICE

## 2019-02-02 NOTE — PROGRESS NOTES
Hilton Cruz is a 53year old female. HPI:   No chief complaint on file. Patient is 7 minutes late for her appointment     patient is a 60-year-old female who presents for follow-up with a chief complaint of hives.       Patient last seen by me normal     To see dr godoy on Wednesday.       No hives in office today           HISTORY:  Past Medical History:   Diagnosis Date   • Anxiety state, unspecified    • Breast mass, left     has had mammo, ultrasound, biopsy   • [facility]    left (twelve) hours as needed for Allergies. Disp: 60 tablet Rfl: 0   hydrochlorothiazide 12.5 MG Oral Cap Take 1 capsule (12.5 mg total) by mouth daily.  Disp: 90 capsule Rfl: 0   RaNITidine HCl 150 MG Oral Tab Take 1 tablet (150 mg total) by mouth 2 (two) time normal respiratory effort   Cardiovascular: regular rate and rhythm no murmurs, gallups, or rubs  Abdomen: soft non-tender non-distended  Skin/Hair: no unusual rashes present   Extremities: no edema, cyanosis, or clubbing     ASSESSMENT/PLAN:   Assessment

## 2019-02-02 NOTE — PATIENT INSTRUCTIONS
Recs:  cpm with xyzal and zantac twice a day   Complete current prednisone 20 mg for today and then 10 mg once a day for the next 3 days  Benadryl 25 mg every 4-6 hours as needed  Additional course of prednisone times 2 weeks with taper provided in the corky

## 2019-02-06 ENCOUNTER — OFFICE VISIT (OUTPATIENT)
Dept: HEMATOLOGY/ONCOLOGY | Facility: HOSPITAL | Age: 49
End: 2019-02-06
Attending: INTERNAL MEDICINE
Payer: COMMERCIAL

## 2019-02-06 VITALS
WEIGHT: 256 LBS | SYSTOLIC BLOOD PRESSURE: 132 MMHG | HEART RATE: 89 BPM | RESPIRATION RATE: 16 BRPM | HEIGHT: 66.5 IN | DIASTOLIC BLOOD PRESSURE: 84 MMHG | TEMPERATURE: 99 F | BODY MASS INDEX: 40.66 KG/M2

## 2019-02-06 DIAGNOSIS — C50.412 MALIGNANT NEOPLASM OF UPPER-OUTER QUADRANT OF LEFT BREAST IN FEMALE, ESTROGEN RECEPTOR POSITIVE (HCC): Primary | ICD-10-CM

## 2019-02-06 DIAGNOSIS — Z17.0 MALIGNANT NEOPLASM OF UPPER-OUTER QUADRANT OF LEFT BREAST IN FEMALE, ESTROGEN RECEPTOR POSITIVE (HCC): Primary | ICD-10-CM

## 2019-02-06 PROCEDURE — 99215 OFFICE O/P EST HI 40 MIN: CPT | Performed by: INTERNAL MEDICINE

## 2019-02-06 RX ORDER — TAMOXIFEN CITRATE 20 MG/1
20 TABLET ORAL DAILY
Qty: 30 TABLET | Refills: 6 | Status: SHIPPED | OUTPATIENT
Start: 2019-02-06 | End: 2019-08-10

## 2019-02-06 NOTE — PROGRESS NOTES
VU     Emmy Jane is a 53year old female here for f/u of Malignant neoplasm of upper-outer quadrant of left breast in female, estrogen receptor positive ([facility])  (primary encounter diagnosis)    She is here today for f/u. Completed RT.      Darcy Pennington predniSONE 10 MG Oral Tab Take 40mg q day x 5 days,then 30mg x 3 days,then 20 mg x 3 days,then10 mg x 3 days with food Disp: 38 tablet Rfl: 0   Levocetirizine Dihydrochloride (XYZAL) 5 MG Oral Tab Take 1 tablet (5 mg total) by mouth every 12 (twelve) hours • SINUS SURGERY       • TONSILLECTOMY       Social History    Socioeconomic History      Marital status: Single      Spouse name: Not on file      Number of children: Not on file      Years of education: Not on file      Highest education level: Not on martha Temp 98.5 °F (36.9 °C) (Oral)   Resp 16   Ht 1.689 m (5' 6.5\")   Wt 116.1 kg (256 lb)   BMI 40.70 kg/m²   Wt Readings from Last 6 Encounters:  02/06/19 : 116.1 kg (256 lb)  01/24/19 : 114.9 kg (253 lb 6.4 oz)  01/17/19 : 113.4 kg (250 lb)  01/05/19 : 117 Patient to have initiation of adjuvant hormonal therapy with tamoxifen and is premenopausal.  Discussed that the duration of treatment will be for a period of 5 years.   This is based on tumor being less than 5 mm, and node negative, therefore low risk of r No orders of the defined types were placed in this encounter.

## 2019-02-06 NOTE — PATIENT INSTRUCTIONS
Tamoxifen oral tablet  Brand Name: Nolvadex  What is this medicine? TAMOXIFEN (ta MOX i fen) blocks the effects of estrogen. It is commonly used to treat breast cancer.  It is also used to decrease the chance of breast cancer coming back in women who hav Side effects that usually do not require medical attention (report to your doctor or health care professional if they continue or are bothersome):  · fatigue  · hair loss, although uncommon and is usually mild  · headache  · hot flashes  · impotence (in me Visit your doctor or health care professional for regular checks on your progress. You will need regular pelvic exams, breast exams, and mammograms.  If you are taking this medicine to reduce your risk of getting breast cancer, you should know that this med

## 2019-02-12 RX ORDER — RANITIDINE 150 MG/1
150 TABLET ORAL 2 TIMES DAILY
Qty: 60 TABLET | Refills: 0 | Status: SHIPPED | OUTPATIENT
Start: 2019-02-12 | End: 2019-02-22

## 2019-02-12 NOTE — TELEPHONE ENCOUNTER
Pt is requesting 90 day supply for Ranitidine 150MG tablet. Please advise. Quantity: 180.0  Directions: TAKE 1 TABLET BY MOUTH TWICE DAILY.

## 2019-02-16 ENCOUNTER — OFFICE VISIT (OUTPATIENT)
Dept: FAMILY MEDICINE CLINIC | Facility: CLINIC | Age: 49
End: 2019-02-16
Payer: COMMERCIAL

## 2019-02-16 VITALS
TEMPERATURE: 99 F | WEIGHT: 256 LBS | HEART RATE: 96 BPM | DIASTOLIC BLOOD PRESSURE: 93 MMHG | SYSTOLIC BLOOD PRESSURE: 143 MMHG | BODY MASS INDEX: 40.66 KG/M2 | HEIGHT: 66.5 IN

## 2019-02-16 DIAGNOSIS — R03.0 ELEVATED BLOOD PRESSURE READING: ICD-10-CM

## 2019-02-16 DIAGNOSIS — F43.9 STRESS AT HOME: ICD-10-CM

## 2019-02-16 DIAGNOSIS — R30.0 DYSURIA: Primary | ICD-10-CM

## 2019-02-16 DIAGNOSIS — Z12.11 COLON CANCER SCREENING: ICD-10-CM

## 2019-02-16 DIAGNOSIS — Z12.4 PAP SMEAR FOR CERVICAL CANCER SCREENING: ICD-10-CM

## 2019-02-16 DIAGNOSIS — Z56.6 STRESS AT WORK: ICD-10-CM

## 2019-02-16 DIAGNOSIS — Z80.0 FH: COLON CANCER: ICD-10-CM

## 2019-02-16 DIAGNOSIS — F43.23 ADJUSTMENT REACTION WITH ANXIETY AND DEPRESSION: ICD-10-CM

## 2019-02-16 LAB
APPEARANCE: CLEAR
BILIRUBIN: NEGATIVE
GLUCOSE (URINE DIPSTICK): NEGATIVE MG/DL
KETONES (URINE DIPSTICK): NEGATIVE MG/DL
MULTISTIX LOT#: NORMAL NUMERIC
NITRITE, URINE: NEGATIVE
PH, URINE: 7.5 (ref 4.5–8)
PROTEIN (URINE DIPSTICK): NEGATIVE MG/DL
SPECIFIC GRAVITY: 1.01 (ref 1–1.03)
URINE-COLOR: YELLOW
UROBILINOGEN,SEMI-QN: 0.2 MG/DL (ref 0–1.9)

## 2019-02-16 PROCEDURE — 87086 URINE CULTURE/COLONY COUNT: CPT | Performed by: FAMILY MEDICINE

## 2019-02-16 PROCEDURE — 81002 URINALYSIS NONAUTO W/O SCOPE: CPT | Performed by: FAMILY MEDICINE

## 2019-02-16 PROCEDURE — 99214 OFFICE O/P EST MOD 30 MIN: CPT | Performed by: FAMILY MEDICINE

## 2019-02-16 PROCEDURE — 87186 SC STD MICRODIL/AGAR DIL: CPT | Performed by: FAMILY MEDICINE

## 2019-02-16 PROCEDURE — 87088 URINE BACTERIA CULTURE: CPT | Performed by: FAMILY MEDICINE

## 2019-02-16 PROCEDURE — 99212 OFFICE O/P EST SF 10 MIN: CPT | Performed by: FAMILY MEDICINE

## 2019-02-16 RX ORDER — ALPRAZOLAM 0.25 MG/1
0.25 TABLET ORAL NIGHTLY PRN
Qty: 30 TABLET | Refills: 3 | Status: SHIPPED
Start: 2019-02-16 | End: 2019-10-07

## 2019-02-16 RX ORDER — VENLAFAXINE HYDROCHLORIDE 75 MG/1
75 CAPSULE, EXTENDED RELEASE ORAL DAILY
Qty: 90 CAPSULE | Refills: 0 | Status: SHIPPED | OUTPATIENT
Start: 2019-02-16 | End: 2019-05-04 | Stop reason: ALTCHOICE

## 2019-02-16 RX ORDER — VENLAFAXINE HYDROCHLORIDE 150 MG/1
150 CAPSULE, EXTENDED RELEASE ORAL
Qty: 90 CAPSULE | Refills: 0 | Status: SHIPPED | OUTPATIENT
Start: 2019-02-16 | End: 2019-05-04 | Stop reason: ALTCHOICE

## 2019-02-16 RX ORDER — EPINEPHRINE 0.3 MG/.3ML
INJECTION SUBCUTANEOUS
Refills: 1 | COMMUNITY
Start: 2019-01-21 | End: 2020-08-11

## 2019-02-16 RX ORDER — CIPROFLOXACIN 250 MG/1
250 TABLET, FILM COATED ORAL 2 TIMES DAILY
Qty: 6 TABLET | Refills: 0 | Status: SHIPPED | OUTPATIENT
Start: 2019-02-16 | End: 2019-02-19

## 2019-02-16 SDOH — HEALTH STABILITY - MENTAL HEALTH: OTHER PHYSICAL AND MENTAL STRAIN RELATED TO WORK: Z56.6

## 2019-02-16 NOTE — PROGRESS NOTES
HPI:    Patient ID: Bruno Cabello is a 53year old female.     HPI  Patient presents with:  Burning On Urination: and vaginal itchiness pt states she had UTI a few weeks and having the same symptoms   Pap  Referral: for colonoscopy     Patient also men 40.0-44.9, adult [facility])    • Vitamin D deficiency      Past Surgical History:   Procedure Laterality Date   • BENIGN BIOPSY LEFT     • BENIGN BIOPSY RIGHT     • BREAST BIOPSY NEEDLE LOCALIZATION Left 10/29/2018    Performed by Jordan Joy MD at [facility] Emotionally abused: Not on file        Physically abused: Not on file        Forced sexual activity: Not on file    Other Topics      Concerns:         Service: Not Asked        Blood Transfusions: Not Asked        Caffeine Concern: Yes          1- Take 1 tablet (250 mg total) by mouth 2 (two) times daily for 3 days. Disp: 6 tablet Rfl: 0   RaNITidine HCl 150 MG Oral Tab Take 1 tablet (150 mg total) by mouth 2 (two) times daily.  Disp: 60 tablet Rfl: 0   Tamoxifen Citrate 20 MG Oral Tab Take 1 tablet on the left labia. Cervix exhibits no motion tenderness, no discharge and no friability. Right adnexum displays no mass, no tenderness and no fullness. Left adnexum displays no mass, no tenderness and no fullness. No vaginal discharge found.    Valentina Nova Refills   • ALPRAZolam 0.25 MG Oral Tab 30 tablet 3     Sig: Take 1 tablet (0.25 mg total) by mouth nightly as needed for Anxiety. • Venlafaxine HCl ER 75 MG Oral Capsule SR 24 Hr 90 capsule 0     Sig: Take 1 capsule (75 mg total) by mouth daily.  TO TAKE

## 2019-02-19 LAB — HPV I/H RISK 1 DNA SPEC QL NAA+PROBE: NEGATIVE

## 2019-02-22 RX ORDER — RANITIDINE 150 MG/1
150 TABLET ORAL 2 TIMES DAILY
Qty: 60 TABLET | Refills: 0 | Status: SHIPPED | OUTPATIENT
Start: 2019-02-22 | End: 2019-12-17 | Stop reason: ALTCHOICE

## 2019-04-18 ENCOUNTER — TELEPHONE (OUTPATIENT)
Dept: FAMILY MEDICINE CLINIC | Facility: CLINIC | Age: 49
End: 2019-04-18

## 2019-04-18 RX ORDER — CARISOPRODOL 350 MG/1
350 TABLET ORAL 3 TIMES DAILY PRN
Qty: 30 TABLET | Refills: 0 | Status: SHIPPED
Start: 2019-04-18 | End: 2019-12-17 | Stop reason: ALTCHOICE

## 2019-04-18 NOTE — TELEPHONE ENCOUNTER
Patient here today with her daughters for their office visit. She mentions been having upper back pain and neck spasms. She states she has tried Flexeril without relief. She also recently tried the metaxalone without significant relief.   She is asking i

## 2019-04-26 ENCOUNTER — TELEPHONE (OUTPATIENT)
Dept: OTHER | Age: 49
End: 2019-04-26

## 2019-04-26 NOTE — TELEPHONE ENCOUNTER
Pt called stated she was put on Effexor for depression because the Zoloft interacted with rx Tamoxifen   Stated Effexor is not working, she's extremely depressed d/t do not have good family support/15 y/o daughter smoking drugs,etc,her job will not let her

## 2019-05-03 NOTE — TELEPHONE ENCOUNTER
Dr Brown=Dr Bell is fully booked tomorrow, do you want us to do double book and what time ?      Please reply to lamar: CECILIA Leyva

## 2019-05-03 NOTE — TELEPHONE ENCOUNTER
Dr Radha Esposito, please for Dr Beni Pacheco, please advise here. Dr Beni Pacheco, please advise, add to your Saturday 5/4/19 schedule, or call or? ?    Patient called to request to return to zoloft medication, said it's only medication to help her.  She's tried variety of antidep

## 2019-05-04 PROBLEM — Z63.9 FAMILY DYSFUNCTION: Status: ACTIVE | Noted: 2019-05-04

## 2019-05-04 NOTE — PROGRESS NOTES
HPI:    Patient ID: Josephine Henriquez is a 53year old female. HPI  Patient presents with:  Medication Request: zoloft     Patient was added to the schedule today.       See nurse triage note:    Patient called to request to return to zoloft medication, hardship. She is things he tries to get the daughters against her. Daughters have been calling him and he has called the [employer] police on her as well.   She states she is trying to work through things with her daughter through the school and social worke swelling. Gastrointestinal: Negative for abdominal pain, constipation and diarrhea. Neurological: Negative for dizziness, syncope, speech difficulty, weakness, light-headedness, numbness and headaches.    Psychiatric/Behavioral: Positive for behavioral HIVES  Latex                   HIVES, RASH   PHYSICAL EXAM:   Patient presents with:  Medication Request: zoloft      Physical Exam   Constitutional: She is oriented to person, place, and time. She appears well-developed and well-nourished.    Neck: Elvira Naeem encounter. Meds This Visit:  Requested Prescriptions     Signed Prescriptions Disp Refills   • Sertraline HCl (ZOLOFT) 50 MG Oral Tab 30 tablet 0     Sig: Take 1 tablet (50 mg total) by mouth daily.        Imaging & Referrals:  None       #[id number]

## 2019-05-04 NOTE — TELEPHONE ENCOUNTER
Per Dr. Kevin Kwong patient can be added to the schedule today 5/4/19 at [facility] with patient and the appointment was scheduled for today 5/4/19. Patient voiced understanding.

## 2019-05-10 RX ORDER — VENLAFAXINE HYDROCHLORIDE 75 MG/1
75 CAPSULE, EXTENDED RELEASE ORAL DAILY
Qty: 90 CAPSULE | Refills: 1 | Status: SHIPPED | OUTPATIENT
Start: 2019-05-10 | End: 2019-05-10

## 2019-05-10 NOTE — TELEPHONE ENCOUNTER
Refill passed per [facility], [facility] protocol.   Refill Protocol Appointment Criteria  · Appointment scheduled in the past 6 months or in the next 3 months  Recent Outpatient Visits            6 days ago Anxiety and depression    [address] [facility]

## 2019-05-16 ENCOUNTER — TELEPHONE (OUTPATIENT)
Dept: FAMILY MEDICINE CLINIC | Facility: CLINIC | Age: 49
End: 2019-05-16

## 2019-05-16 NOTE — TELEPHONE ENCOUNTER
Call patient to check and see how she was doing. Patient is doing slightly better. She is taking the medication. Also informed patient that I touch base with her oncologist Dr. Kaleigh Benton as patient had stopped taking the tamoxifen.   Given that she is premenop

## 2019-05-29 NOTE — TELEPHONE ENCOUNTER
Dr Claudio Palma pt was placed on sertraline at [address] [place] [address] 05/04/19, was supposed to f/u in a couple weeks? [id number] Milly Iglesias with refill.   Refill Protocol Appointment Criteria  · Appointment scheduled in the past 12 months or in the next 3 months  Recent Outpatient Visits            3

## 2019-06-06 ENCOUNTER — OFFICE VISIT (OUTPATIENT)
Dept: HEMATOLOGY/ONCOLOGY | Facility: HOSPITAL | Age: 49
End: 2019-06-06
Attending: INTERNAL MEDICINE
Payer: COMMERCIAL

## 2019-06-06 VITALS
WEIGHT: 253 LBS | HEIGHT: 66.5 IN | OXYGEN SATURATION: 99 % | RESPIRATION RATE: 16 BRPM | HEART RATE: 87 BPM | TEMPERATURE: 98 F | DIASTOLIC BLOOD PRESSURE: 74 MMHG | BODY MASS INDEX: 40.18 KG/M2 | SYSTOLIC BLOOD PRESSURE: 134 MMHG

## 2019-06-06 DIAGNOSIS — Z51.81 ENCOUNTER FOR MONITORING TAMOXIFEN THERAPY: ICD-10-CM

## 2019-06-06 DIAGNOSIS — Z79.810 ENCOUNTER FOR MONITORING TAMOXIFEN THERAPY: ICD-10-CM

## 2019-06-06 DIAGNOSIS — C50.412 MALIGNANT NEOPLASM OF UPPER-OUTER QUADRANT OF LEFT BREAST IN FEMALE, ESTROGEN RECEPTOR POSITIVE (HCC): Primary | ICD-10-CM

## 2019-06-06 DIAGNOSIS — Z17.0 MALIGNANT NEOPLASM OF UPPER-OUTER QUADRANT OF LEFT BREAST IN FEMALE, ESTROGEN RECEPTOR POSITIVE (HCC): Primary | ICD-10-CM

## 2019-06-06 PROCEDURE — 99214 OFFICE O/P EST MOD 30 MIN: CPT | Performed by: INTERNAL MEDICINE

## 2019-06-13 DIAGNOSIS — R60.0 LOWER EXTREMITY EDEMA: ICD-10-CM

## 2019-06-13 RX ORDER — HYDROCHLOROTHIAZIDE 12.5 MG/1
CAPSULE, GELATIN COATED ORAL
Qty: 90 CAPSULE | Refills: 0 | OUTPATIENT
Start: 2019-06-13

## 2019-06-27 ENCOUNTER — HOSPITAL ENCOUNTER (OUTPATIENT)
Dept: MAMMOGRAPHY | Facility: HOSPITAL | Age: 49
Discharge: HOME | End: 2019-06-27
Attending: SURGERY
Payer: COMMERCIAL

## 2019-06-27 DIAGNOSIS — Z17.0 MALIGNANT NEOPLASM OF UPPER-OUTER QUADRANT OF LEFT BREAST IN FEMALE, ESTROGEN RECEPTOR POSITIVE (HCC): ICD-10-CM

## 2019-06-27 DIAGNOSIS — C50.412 MALIGNANT NEOPLASM OF UPPER-OUTER QUADRANT OF LEFT BREAST IN FEMALE, ESTROGEN RECEPTOR POSITIVE (HCC): ICD-10-CM

## 2019-06-27 PROCEDURE — 77066 DX MAMMO INCL CAD BI: CPT | Performed by: SURGERY

## 2019-06-27 PROCEDURE — 77062 BREAST TOMOSYNTHESIS BI: CPT | Performed by: SURGERY

## 2019-07-01 DIAGNOSIS — Z17.0 MALIGNANT NEOPLASM OF UPPER-OUTER QUADRANT OF LEFT BREAST IN FEMALE, ESTROGEN RECEPTOR POSITIVE (HCC): Primary | ICD-10-CM

## 2019-07-01 DIAGNOSIS — C50.412 MALIGNANT NEOPLASM OF UPPER-OUTER QUADRANT OF LEFT BREAST IN FEMALE, ESTROGEN RECEPTOR POSITIVE (HCC): Primary | ICD-10-CM

## 2019-07-02 NOTE — TELEPHONE ENCOUNTER
Please advise on refill request     Refill Protocol Appointment Criteria  · Appointment scheduled in the past 6 months or in the next 3 months  Recent Outpatient Visits            3 weeks ago Malignant neoplasm of upper-outer quadrant of left breast in fem

## 2019-07-15 RX ORDER — TOPIRAMATE 25 MG/1
TABLET ORAL
Qty: 180 TABLET | Refills: 0 | OUTPATIENT
Start: 2019-07-15

## 2019-08-08 NOTE — TELEPHONE ENCOUNTER
Refill passed per [facility], [facility] protocol.   Refill Protocol Appointment Criteria  · Appointment scheduled in the past 6 months or in the next 3 months  Recent Outpatient Visits            2 months ago Malignant neoplasm of upper-outer quadrant of left gonzalo

## 2019-08-11 RX ORDER — TAMOXIFEN CITRATE 20 MG/1
TABLET ORAL
Qty: 90 TABLET | Refills: 2 | Status: SHIPPED | OUTPATIENT
Start: 2019-08-11 | End: 2020-05-04

## 2019-08-13 ENCOUNTER — TELEPHONE (OUTPATIENT)
Dept: FAMILY MEDICINE CLINIC | Facility: CLINIC | Age: 49
End: 2019-08-13

## 2019-08-13 RX ORDER — SERTRALINE HYDROCHLORIDE 100 MG/1
100 TABLET, FILM COATED ORAL DAILY
Qty: 90 TABLET | Refills: 0 | Status: SHIPPED | OUTPATIENT
Start: 2019-08-13

## 2019-08-13 NOTE — TELEPHONE ENCOUNTER
Advised patient of Dr. Issac Andersen note. Patient verbalized understanding  Patient wanted RN to tell Dr. Nisa Groves, thank you.

## 2019-08-13 NOTE — TELEPHONE ENCOUNTER
Pt states has been taking Sertraline 50mg but has taken 100mg in the past and  states she feels better when she was taking the 100mg. Pt will bring her daughter on Saturday for sport PE, pt can discuss this request with Dr Billie Haynes.      Dr Billie Haynes , pt is request

## 2019-09-25 ENCOUNTER — HOSPITAL ENCOUNTER (OUTPATIENT)
Dept: GENERAL RADIOLOGY | Facility: HOSPITAL | Age: 49
Discharge: HOME | End: 2019-09-25
Attending: PHYSICIAN ASSISTANT
Payer: COMMERCIAL

## 2019-09-25 ENCOUNTER — LAB ENCOUNTER (OUTPATIENT)
Dept: LAB | Age: 49
End: 2019-09-25
Attending: PHYSICIAN ASSISTANT
Payer: COMMERCIAL

## 2019-09-25 ENCOUNTER — OFFICE VISIT (OUTPATIENT)
Dept: FAMILY MEDICINE CLINIC | Facility: CLINIC | Age: 49
End: 2019-09-25
Payer: COMMERCIAL

## 2019-09-25 VITALS
TEMPERATURE: 98 F | HEIGHT: 66.5 IN | HEART RATE: 86 BPM | BODY MASS INDEX: 39.7 KG/M2 | DIASTOLIC BLOOD PRESSURE: 89 MMHG | SYSTOLIC BLOOD PRESSURE: 130 MMHG | WEIGHT: 250 LBS

## 2019-09-25 DIAGNOSIS — R07.9 CHEST PAIN, UNSPECIFIED TYPE: ICD-10-CM

## 2019-09-25 DIAGNOSIS — R07.9 CHEST PAIN, UNSPECIFIED TYPE: Primary | ICD-10-CM

## 2019-09-25 DIAGNOSIS — R07.9 CHEST PAIN: Primary | ICD-10-CM

## 2019-09-25 DIAGNOSIS — B02.9 HERPES ZOSTER WITHOUT COMPLICATION: ICD-10-CM

## 2019-09-25 PROCEDURE — 99213 OFFICE O/P EST LOW 20 MIN: CPT | Performed by: PHYSICIAN ASSISTANT

## 2019-09-25 PROCEDURE — 93005 ELECTROCARDIOGRAM TRACING: CPT

## 2019-09-25 PROCEDURE — 93010 ELECTROCARDIOGRAM REPORT: CPT | Performed by: PHYSICIAN ASSISTANT

## 2019-09-25 PROCEDURE — 71046 X-RAY EXAM CHEST 2 VIEWS: CPT | Performed by: PHYSICIAN ASSISTANT

## 2019-09-25 RX ORDER — VALACYCLOVIR HYDROCHLORIDE 1 G/1
1 TABLET, FILM COATED ORAL EVERY 12 HOURS SCHEDULED
Qty: 14 TABLET | Refills: 0 | Status: SHIPPED | OUTPATIENT
Start: 2019-09-25 | End: 2019-10-02

## 2019-09-25 RX ORDER — HYDROCODONE BITARTRATE AND ACETAMINOPHEN 5; 325 MG/1; MG/1
1 TABLET ORAL EVERY 6 HOURS PRN
Qty: 10 TABLET | Refills: 0 | Status: SHIPPED | OUTPATIENT
Start: 2019-09-25 | End: 2019-12-17 | Stop reason: ALTCHOICE

## 2019-09-25 NOTE — TELEPHONE ENCOUNTER
Chart reviewed and noted on pt OV on 5/4 with AMA, PCP discontinued  Venlafaxine ER 75mg and ordered Sertraline 50mg . Spoke with [facility], RPh and canceled refill approval for Venlafaxine sent today. Subjective:    Patient ID:  Josy Posey is a 89 y.o. female who presents for follow-up of CAD, HTN, PAF      HPI  Ms. Posey is a 89 year old female patient whose current medical conditions include CAD s/p CABG, carotid artery disease, HTN, hyperlipidemia, PAF (on Coumadin), third degree heart block s/p PPM, history of CVA, and diastolic CHF who presents today for her routine six month follow-up. Patient returns today and states she is doing ok. Suffered a fall last week. States she had taken hydrocodone earlier that day after having teeth pulled and was walking around her bed and turned the wrong direction and landed directly on her face. She also injured her bilateral shoulders, right knee, and right arm. Since that fall, she is feeling more weak. Using her wheelchair more, having to stop and rest. She also feels she is slightly confused. Denies any associated blurred vision or slurred speech. No focal deficits. Denies any chest pain, heaviness, or tightness. Chronic SOB/HE when she exerts herself. No PND, lower extremity edema, or orthopnea. No near syncope or syncope. BP ok today in clinic. Patient remains on midodrine 4 times daily. She reports compliance with her medications. EKG today shows paced rhythm, no acute changes.    Labs pending.       Review of Systems   Constitution: Positive for malaise/fatigue. Negative for chills, decreased appetite and fever.   HENT: Negative for congestion, hoarse voice and sore throat.    Eyes: Negative for blurred vision and discharge.   Cardiovascular: Positive for dyspnea on exertion. Negative for chest pain, claudication, cyanosis, irregular heartbeat, leg swelling, near-syncope, orthopnea, palpitations and paroxysmal nocturnal dyspnea.   Respiratory: Positive for shortness of breath. Negative for cough, hemoptysis, snoring, sputum production and wheezing.    Endocrine: Negative for cold intolerance and heat intolerance.   Hematologic/Lymphatic: Negative for  bleeding problem. Does not bruise/bleed easily.   Skin: Negative for rash.   Musculoskeletal: Positive for arthritis, falls and joint pain. Negative for back pain, joint swelling, muscle cramps, muscle weakness and myalgias.   Gastrointestinal: Negative for abdominal pain, constipation, diarrhea, heartburn, melena and nausea.   Genitourinary: Negative for hematuria.   Neurological: Negative for dizziness, focal weakness, headaches, light-headedness, loss of balance, numbness, paresthesias, seizures and weakness.   Psychiatric/Behavioral: Negative for memory loss. The patient does not have insomnia.    Allergic/Immunologic: Negative for hives.     /79 (BP Location: Right arm)   Pulse 75     Objective:    Physical Exam   Constitutional: She is oriented to person, place, and time. She appears well-developed and well-nourished. No distress.   In wheelchair   HENT:   Head: Normocephalic and atraumatic.   Eyes: Pupils are equal, round, and reactive to light. Right eye exhibits no discharge. Left eye exhibits no discharge.   Neck: Neck supple. No JVD present.   Cardiovascular: Normal rate, regular rhythm, normal heart sounds and intact distal pulses. PMI is not displaced. Exam reveals no gallop, no S3, no S4 and no friction rub.   No murmur heard.  Pulmonary/Chest: Effort normal and breath sounds normal. No respiratory distress. She has no wheezes. She has no rales.   PPM site well-healed   Abdominal: Soft. She exhibits no distension.   Musculoskeletal: She exhibits no edema.   Neurological: She is alert and oriented to person, place, and time.   No focal deficits on exam   Skin: Skin is warm and dry. She is not diaphoretic. No erythema.   Bruises noted to bilateral shoulders, right arm, right knee    Bruising bilateral cheeks and forehead   Psychiatric: She has a normal mood and affect. Her behavior is normal. Thought content normal.   Nursing note and vitals reviewed.    2D Echo CONCLUSIONS     1 - Severe left  atrial enlargement.     2 - Concentric hypertrophy.     3 - No wall motion abnormalities.     4 - Normal left ventricular systolic function (EF 55-60%).     5 - Normal right ventricular systolic function .     6 - The estimated PA systolic pressure is 39 mmHg.     7 - Mitral valve prosthesis.     8 - Trivial mitral regurgitation.       Chemistry        Component Value Date/Time     07/29/2019 1144    K 4.3 07/29/2019 1144     07/29/2019 1144    CO2 25 07/29/2019 1144    BUN 28 07/29/2019 1144    CREATININE 1.3 07/29/2019 1144    GLU 97 07/29/2019 1144        Component Value Date/Time    CALCIUM 9.2 07/29/2019 1144    ALKPHOS 56 07/29/2019 1144    AST 19 07/29/2019 1144    ALT 13 07/29/2019 1144    BILITOT 0.6 07/29/2019 1144    ESTGFRAFRICA 40.6 (A) 07/29/2019 1144    EGFRNONAA 35.2 (A) 07/29/2019 1144        Lab Results   Component Value Date    CHOL 181 07/29/2019    CHOL 187 04/23/2019    CHOL 190 10/31/2018     Lab Results   Component Value Date    HDL 41 07/29/2019    HDL 40 04/23/2019    HDL 49 10/31/2018     Lab Results   Component Value Date    LDLCALC 117.6 07/29/2019    LDLCALC 120.6 04/23/2019    LDLCALC 117.4 10/31/2018     Lab Results   Component Value Date    TRIG 112 07/29/2019    TRIG 132 04/23/2019    TRIG 118 10/31/2018     Lab Results   Component Value Date    CHOLHDL 22.7 07/29/2019    CHOLHDL 21.4 04/23/2019    CHOLHDL 25.8 10/31/2018     Lab Results   Component Value Date    HGBA1C 5.5 03/10/2018     Lab Results   Component Value Date    INR 3.0 09/25/2019    INR 2.8 09/16/2019    INR 2.0 (A) 08/26/2019       Assessment:       1. Fall, subsequent encounter    2. H/O CVA (cerebrovascular accident)    3. Bilateral carotid artery stenosis    4. Atherosclerosis of aorta    5. Coronary artery disease involving native coronary artery of native heart without angina pectoris    6. Chronic diastolic HFpEF of 60% per echo 5/2017    7. Orthostatic hypotension    8. Cardiac pacemaker    9.  Severe MR with remote mitral valve replacement on chronic anticoagulation    10. Paroxysmal atrial fibrillation    11. CKD (chronic kidney disease) stage 3, GFR 30-59 ml/min    12. Weakness      Patient presents for f/u. Doing ok, s/p fall. Reports some increased weakness and confusion. Declined ED. Will check stat CT of head without contrast and baseline labs-CBC, CMP, as well as UA. Continue same cardiac meds in meantime. Scheduled to see Coumadin clinic today. Counseled on fall precautions.  Plan:   -Declined ED  -Stat CT of head without contrast  -CMP, CBC, UA with phone review  -ED if any severe symptoms  -Fall precautions  -Keep Coumadin clinic f/u  -Follow-up TBA    Chart reviewed. Dr. Alvarado agrees with plan outlined above.

## 2019-09-25 NOTE — PROGRESS NOTES
HPI:    Patient ID: Sammie Fox is a 53year old female. Patient presents for blisters on her left armpit for the past 3 days. Also has throat pain that started today. She has had shingles in the past. The blisters are painful. No fever/chills.  Westly Gathers Take 1 capsule by mouth daily.  Disp:  Rfl:    TAMOXIFEN CITRATE 20 MG Oral Tab TAKE 1 TABLET BY MOUTH EVERY DAY Disp: 90 tablet Rfl: 2   Carisoprodol (SOMA) 350 MG Oral Tab Take 1 tablet (350 mg total) by mouth 3 (three) times daily as needed for Muscle sp follow-up with worsening symptoms or concerns.   -Pt was agreeable to plan and will comply with discussion above. - XR CHEST PA + LAT CHEST (CPT=71046); Future  - EKG 12-LEAD    2.  Herpes zoster without complication  -Gave valtrex   -Educated pt on how

## 2019-09-28 ENCOUNTER — TELEPHONE (OUTPATIENT)
Dept: OTHER | Age: 49
End: 2019-09-28

## 2019-09-28 RX ORDER — GABAPENTIN 100 MG/1
100 CAPSULE ORAL 2 TIMES DAILY
Qty: 30 CAPSULE | Refills: 0 | Status: SHIPPED | OUTPATIENT
Start: 2019-09-28 | End: 2019-10-09

## 2019-09-28 NOTE — TELEPHONE ENCOUNTER
Spoke with patient, informed of Dr. Rita Campos notes below, she will  RX for Gabapentin today   Patient verbalizes understanding and agrees

## 2019-09-28 NOTE — TELEPHONE ENCOUNTER
Pt states she was dx with shingles and symptoms worsen. Pt states rash spread to both shoulders and to back. Pain increased 8/10. Taking valacyclovir and norco with no relief. Please advise.

## 2019-09-28 NOTE — TELEPHONE ENCOUNTER
Message noted. May start gabapentin as directed for shingles pain. Continue valtrex and can take norco as needed. Erx sent to listed pharmacy. To follow up for appointment if not better or go to urgent care/ ER if pain persist/ worse.  Please notify patient

## 2019-10-07 RX ORDER — ALPRAZOLAM 0.25 MG/1
0.25 TABLET ORAL NIGHTLY PRN
Qty: 30 TABLET | Refills: 0 | Status: SHIPPED
Start: 2019-10-07

## 2019-10-10 RX ORDER — GABAPENTIN 100 MG/1
CAPSULE ORAL
Qty: 30 CAPSULE | Refills: 0 | Status: SHIPPED | OUTPATIENT
Start: 2019-10-10 | End: 2019-12-17 | Stop reason: ALTCHOICE

## 2019-10-25 RX ORDER — GABAPENTIN 100 MG/1
CAPSULE ORAL
Qty: 30 CAPSULE | Refills: 0 | OUTPATIENT
Start: 2019-10-25

## 2019-10-25 NOTE — TELEPHONE ENCOUNTER
Review pended refill request as it does not fall under a protocol.     Last Rx: 10/10/19  LOV: 4 weeks ago

## 2019-12-09 ENCOUNTER — APPOINTMENT (OUTPATIENT)
Dept: HEMATOLOGY/ONCOLOGY | Facility: HOSPITAL | Age: 49
End: 2019-12-09
Attending: INTERNAL MEDICINE
Payer: COMMERCIAL

## 2019-12-17 ENCOUNTER — OFFICE VISIT (OUTPATIENT)
Dept: HEMATOLOGY/ONCOLOGY | Facility: HOSPITAL | Age: 49
End: 2019-12-17
Attending: INTERNAL MEDICINE
Payer: COMMERCIAL

## 2019-12-17 VITALS
OXYGEN SATURATION: 100 % | WEIGHT: 253 LBS | BODY MASS INDEX: 40.18 KG/M2 | SYSTOLIC BLOOD PRESSURE: 148 MMHG | RESPIRATION RATE: 18 BRPM | HEART RATE: 92 BPM | TEMPERATURE: 98 F | HEIGHT: 66.5 IN | DIASTOLIC BLOOD PRESSURE: 87 MMHG

## 2019-12-17 DIAGNOSIS — Z79.810 ENCOUNTER FOR MONITORING TAMOXIFEN THERAPY: ICD-10-CM

## 2019-12-17 DIAGNOSIS — C50.412 MALIGNANT NEOPLASM OF UPPER-OUTER QUADRANT OF LEFT BREAST IN FEMALE, ESTROGEN RECEPTOR POSITIVE (HCC): Primary | ICD-10-CM

## 2019-12-17 DIAGNOSIS — Z17.0 MALIGNANT NEOPLASM OF UPPER-OUTER QUADRANT OF LEFT BREAST IN FEMALE, ESTROGEN RECEPTOR POSITIVE (HCC): Primary | ICD-10-CM

## 2019-12-17 DIAGNOSIS — Z51.81 ENCOUNTER FOR MONITORING TAMOXIFEN THERAPY: ICD-10-CM

## 2019-12-17 PROCEDURE — 99214 OFFICE O/P EST MOD 30 MIN: CPT | Performed by: INTERNAL MEDICINE

## 2019-12-17 RX ORDER — TRAZODONE HYDROCHLORIDE 100 MG/1
100 TABLET ORAL
Refills: 1 | COMMUNITY
Start: 2019-11-11

## 2019-12-17 RX ORDER — LISDEXAMFETAMINE DIMESYLATE 50 MG
CAPSULE ORAL
Refills: 0 | COMMUNITY
Start: 2019-12-01 | End: 2021-01-21 | Stop reason: ALTCHOICE

## 2019-12-17 RX ORDER — RISPERIDONE 0.25 MG/1
TABLET, FILM COATED ORAL
COMMUNITY
Start: 2019-12-09 | End: 2020-12-17

## 2019-12-17 NOTE — PROGRESS NOTES
HPI     Mihaela Snider is a 51year old female here for f/u of Malignant neoplasm of upper-outer quadrant of left breast in female, estrogen receptor positive (hcc)  (primary encounter diagnosis)  Encounter for monitoring tamoxifen therapy    She is Anxiety. Not to take with any muscle relaxer 30 tablet 0   • Sertraline HCl 100 MG Oral Tab Take 1 tablet (100 mg total) by mouth daily.  (Patient taking differently: Take 125 mg by mouth daily.  ) 90 tablet 0   • TAMOXIFEN CITRATE 20 MG Oral Tab TAKE 1 TAB Grandmother 43        d.  79   • Breast Cancer Sister 52   • Cancer Maternal Uncle [de-identified]        unknown primary   • Cancer Maternal Grandfather 79        prostate/bladder ca   • Heart Disorder Father    • Diabetes Father    • Hypertension Father    • Other (Ot Positive, HI: Positive, HER2: Negative) - Signed by Drake Ventura MD on 2/6/2019    Patient completed local treatment with lumpectomy October 29, 2018. Skipwith lymph node biopsy was completed on November 12, 2018. This was negative.      The patient co

## 2019-12-30 ENCOUNTER — HOSPITAL ENCOUNTER (OUTPATIENT)
Dept: MAMMOGRAPHY | Facility: HOSPITAL | Age: 49
Discharge: HOME | End: 2019-12-30
Attending: SURGERY
Payer: COMMERCIAL

## 2019-12-30 DIAGNOSIS — Z17.0 MALIGNANT NEOPLASM OF UPPER-OUTER QUADRANT OF LEFT BREAST IN FEMALE, ESTROGEN RECEPTOR POSITIVE (HCC): ICD-10-CM

## 2019-12-30 DIAGNOSIS — C50.412 MALIGNANT NEOPLASM OF UPPER-OUTER QUADRANT OF LEFT BREAST IN FEMALE, ESTROGEN RECEPTOR POSITIVE (HCC): ICD-10-CM

## 2019-12-30 PROCEDURE — 77061 BREAST TOMOSYNTHESIS UNI: CPT | Performed by: SURGERY

## 2019-12-30 PROCEDURE — 77065 DX MAMMO INCL CAD UNI: CPT | Performed by: SURGERY

## 2019-12-31 DIAGNOSIS — R92.8 ABNORMAL MAMMOGRAM: Primary | ICD-10-CM

## 2020-01-06 ENCOUNTER — TELEPHONE (OUTPATIENT)
Dept: SURGERY | Facility: CLINIC | Age: 50
End: 2020-01-06

## 2020-01-06 NOTE — TELEPHONE ENCOUNTER
I contacted the patient to address her concerns. She is unavailable to talk due to a work call. I will call her again after we are done with clinic. Pt verbalized understanding, and agreeable to the plan.

## 2020-01-06 NOTE — TELEPHONE ENCOUNTER
Pt had phoned in to ask about the wording on the mammogram report, \"probaby benign. \"  I reassured her that the mammogram was good, and there was nothing abnormal.   Everything is stable. I reassured her Dr Blanca Schroeder has reviewed it, and it looks good.   Dominique Garcia

## 2020-01-10 ENCOUNTER — OFFICE VISIT (OUTPATIENT)
Dept: FAMILY MEDICINE CLINIC | Facility: CLINIC | Age: 50
End: 2020-01-10
Payer: COMMERCIAL

## 2020-01-10 VITALS
HEART RATE: 87 BPM | HEIGHT: 66.5 IN | BODY MASS INDEX: 40.18 KG/M2 | DIASTOLIC BLOOD PRESSURE: 84 MMHG | WEIGHT: 253 LBS | SYSTOLIC BLOOD PRESSURE: 125 MMHG | TEMPERATURE: 98 F | RESPIRATION RATE: 18 BRPM

## 2020-01-10 DIAGNOSIS — N89.8 VAGINAL DISCHARGE: ICD-10-CM

## 2020-01-10 DIAGNOSIS — R21 RASH: Primary | ICD-10-CM

## 2020-01-10 PROCEDURE — 99213 OFFICE O/P EST LOW 20 MIN: CPT | Performed by: PHYSICIAN ASSISTANT

## 2020-01-10 RX ORDER — AMOXICILLIN AND CLAVULANATE POTASSIUM 875; 125 MG/1; MG/1
1 TABLET, FILM COATED ORAL 2 TIMES DAILY
Qty: 20 TABLET | Refills: 0 | Status: SHIPPED | OUTPATIENT
Start: 2020-01-10 | End: 2020-04-24 | Stop reason: ALTCHOICE

## 2020-01-10 NOTE — PROGRESS NOTES
HPI:    Patient ID: Dallas Olszewski is a 51year old female. Patient presents for rash on left breast which is itchy. Hx of breast cancer for which she was given radiation for treatment and completed treatment. This was 1 yr ago.  She has noticed this Take 1 capsule by mouth daily.        Allergies:  Bactrim [Sulfametho*    HIVES  Latex                   HIVES, RASH   /84 (BP Location: Left arm, Patient Position: Sitting, Cuff Size: large)   Pulse 87   Temp 98 °F (36.7 °C) (Oral)   Resp 18   Ht 5' types were placed in this encounter. Meds This Visit:  Requested Prescriptions     Signed Prescriptions Disp Refills   • Amoxicillin-Pot Clavulanate (AUGMENTIN) 875-125 MG Oral Tab 20 tablet 0     Sig: Take 1 tablet by mouth 2 (two) times daily.    • t

## 2020-01-12 LAB
C TRACH DNA SPEC QL NAA+PROBE: NEGATIVE
N GONORRHOEA DNA SPEC QL NAA+PROBE: NEGATIVE

## 2020-01-13 LAB
GENITAL VAGINOSIS SCREEN: NEGATIVE
TRICHOMONAS SCREEN: NEGATIVE

## 2020-04-24 ENCOUNTER — OFFICE VISIT (OUTPATIENT)
Dept: HEMATOLOGY/ONCOLOGY | Facility: HOSPITAL | Age: 50
End: 2020-04-24
Attending: INTERNAL MEDICINE
Payer: COMMERCIAL

## 2020-04-24 ENCOUNTER — NURSE TRIAGE (OUTPATIENT)
Dept: FAMILY MEDICINE CLINIC | Facility: CLINIC | Age: 50
End: 2020-04-24

## 2020-04-24 ENCOUNTER — TELEPHONE (OUTPATIENT)
Dept: HEMATOLOGY/ONCOLOGY | Facility: HOSPITAL | Age: 50
End: 2020-04-24

## 2020-04-24 VITALS
SYSTOLIC BLOOD PRESSURE: 133 MMHG | OXYGEN SATURATION: 100 % | HEART RATE: 79 BPM | DIASTOLIC BLOOD PRESSURE: 79 MMHG | TEMPERATURE: 99 F | HEIGHT: 66.5 IN | WEIGHT: 237 LBS | BODY MASS INDEX: 37.64 KG/M2 | RESPIRATION RATE: 18 BRPM

## 2020-04-24 DIAGNOSIS — N64.4 BREAST PAIN, LEFT: ICD-10-CM

## 2020-04-24 DIAGNOSIS — C50.412 MALIGNANT NEOPLASM OF UPPER-OUTER QUADRANT OF LEFT BREAST IN FEMALE, ESTROGEN RECEPTOR POSITIVE (HCC): Primary | ICD-10-CM

## 2020-04-24 DIAGNOSIS — N89.8 VAGINAL IRRITATION: Primary | ICD-10-CM

## 2020-04-24 DIAGNOSIS — Z17.0 MALIGNANT NEOPLASM OF UPPER-OUTER QUADRANT OF LEFT BREAST IN FEMALE, ESTROGEN RECEPTOR POSITIVE (HCC): Primary | ICD-10-CM

## 2020-04-24 PROCEDURE — 99214 OFFICE O/P EST MOD 30 MIN: CPT | Performed by: NURSE PRACTITIONER

## 2020-04-24 PROCEDURE — 99213 OFFICE O/P EST LOW 20 MIN: CPT | Performed by: PHYSICIAN ASSISTANT

## 2020-04-24 RX ORDER — FLUCONAZOLE 150 MG/1
150 TABLET ORAL ONCE
Qty: 1 TABLET | Refills: 0 | Status: SHIPPED | OUTPATIENT
Start: 2020-04-24 | End: 2020-04-24

## 2020-04-24 RX ORDER — GABAPENTIN 100 MG/1
100 CAPSULE ORAL NIGHTLY
Qty: 90 CAPSULE | Refills: 0 | Status: SHIPPED | OUTPATIENT
Start: 2020-04-24 | End: 2020-07-20

## 2020-04-24 RX ORDER — VALACYCLOVIR HYDROCHLORIDE 1 G/1
1 TABLET, FILM COATED ORAL EVERY 12 HOURS SCHEDULED
Qty: 20 TABLET | Refills: 0 | Status: SHIPPED | OUTPATIENT
Start: 2020-04-24 | End: 2020-05-04

## 2020-04-24 NOTE — TELEPHONE ENCOUNTER
TELEPHONE VISIT PROGRESS NOTE  Todays date: 4/24/2020 12:41 PM      Most recent Nurse Triage message/ Mychart message from patient:     Onset couple days. She's had yeast infections before, this is more in vulvar area, no vaginal itching.  Little vaginal tablet (1,000 mg total) by mouth every 12 (twelve) hours for 10 days. , Disp: 20 tablet, Rfl: 0  •  fluconazole (DIFLUCAN) 150 MG Oral Tab, Take 1 tablet (150 mg total) by mouth daily. , Disp: 2 tablet, Rfl: 0  •  triamcinolone acetonide 0.1 % External Ointm -Pt was agreeable to plan and will comply with discussion above. Orders Placed This Encounter      fluconazole (DIFLUCAN) 150 MG Oral Tab    No orders of the defined types were placed in this encounter.             Patient reminded to practice goo

## 2020-04-24 NOTE — TELEPHONE ENCOUNTER
Action Requested: Summary for Provider     []  Critical Lab, Recommendations Needed  [] Need Additional Advice  []   FYI    []   Need Orders  [] Need Medications Sent to Pharmacy  []  Other     SUMMARY: Ryan Davalos for Dr Billie Haynes, please advise.      Patient may be

## 2020-04-24 NOTE — TELEPHONE ENCOUNTER
Toxicities: Tamoxifen Citrate 20mg daily    Left nipple itchy/Left Areola tender to touch: (For the last six months the patient's left nipple & aerola have been itchy. The skin on the nipple has been dry & flaky. The areola was also itchy.  Over time the

## 2020-04-24 NOTE — PROGRESS NOTES
HPI     Margarita Montano is a 51year old female here for f/u of Malignant neoplasm of upper-outer quadrant of left breast in female, estrogen receptor positive (hcc)  (primary encounter diagnosis)  Breast pain, left    She is here today for acute care Psychiatric/Behavioral: Positive for depression (well controlled.  ) and sleep disturbance (better taking trazodone).            Current Outpatient Medications   Medication Sig Dispense Refill   • fluconazole (DIFLUCAN) 150 MG Oral Tab Take 1 tablet (150 mg • LUMPECTOMY LEFT Left 10/29/2018   • MARISOL BIOPSY STEREO NODULE 2 SITE BILAT (CPT=19081/91826)  2014    negative   • NEEDLE BIOPSY LEFT  10/08/2018   • NEEDLE BIOPSY RIGHT  10/08/2018   • RADIATION LEFT  01/2019   • SINUS SURGERY       • TONSILLECTOMY Abdomen: Soft, non tender with good bowel sounds. Extremities: No edema. Neurological: Grossly intact. Lymphatics: There is no palpable lymphadenopathy throughout in the cervical, supraclavicular, axillary, or inguinal regions.   Psych/Depression: nl  L

## 2020-04-29 ENCOUNTER — HOSPITAL ENCOUNTER (OUTPATIENT)
Dept: MAMMOGRAPHY | Facility: HOSPITAL | Age: 50
Discharge: HOME | End: 2020-04-29
Attending: SURGERY
Payer: COMMERCIAL

## 2020-04-29 DIAGNOSIS — R92.8 ABNORMAL MAMMOGRAM: ICD-10-CM

## 2020-04-29 PROCEDURE — 77062 BREAST TOMOSYNTHESIS BI: CPT | Performed by: SURGERY

## 2020-04-29 PROCEDURE — 77066 DX MAMMO INCL CAD BI: CPT | Performed by: SURGERY

## 2020-05-04 RX ORDER — TAMOXIFEN CITRATE 20 MG/1
TABLET ORAL
Qty: 90 TABLET | Refills: 2 | Status: SHIPPED | OUTPATIENT
Start: 2020-05-04 | End: 2021-02-04

## 2020-05-06 ENCOUNTER — TELEPHONE (OUTPATIENT)
Dept: HEMATOLOGY/ONCOLOGY | Facility: HOSPITAL | Age: 50
End: 2020-05-06

## 2020-05-06 DIAGNOSIS — N64.89 NIPPLE CRUSTING: Primary | ICD-10-CM

## 2020-05-06 NOTE — TELEPHONE ENCOUNTER
malinda randhawa says her left breast nipple has been itching for 8-9 months there are scaly patches, redness on the area.  Thank you Chrissy Engel

## 2020-05-06 NOTE — TELEPHONE ENCOUNTER
Please let the patient know that mammogram was benign. I d/w Dr. Thea Trejo. Recommend an MRI of the breasts. She will f/u with Dr. Thea Trejo.

## 2020-05-06 NOTE — TELEPHONE ENCOUNTER
Pt called and notified spoke with Dr. Keren Raphael. Breast MRI ordered and pt to have f/u appointment with Dr. Keren Raphael post MRI. Pt verbalizes understanding.

## 2020-05-06 NOTE — TELEPHONE ENCOUNTER
Toxicities: Tamoxifen 20 mg daily    Dr Becky Multani patient - left breast cancer. Seen last on 4/24/2020 by CHARLEY David for left nipple pain & itching.     Condition Update: Left Nipple Pain/Itching (Pt took Valtrex x 2 days and then stopped the medication becau

## 2020-05-21 ENCOUNTER — TELEPHONE (OUTPATIENT)
Dept: HEMATOLOGY/ONCOLOGY | Facility: HOSPITAL | Age: 50
End: 2020-05-21

## 2020-05-21 NOTE — TELEPHONE ENCOUNTER
Call received from patient inquiring as to scheduling breast MRI as ordered by Dr. Nicko Mcduffie. Exam is authorized by insurance. Asked patient to share date of LMP so that MRI could be scheduled at the appropriate time. Patient unable to share date.   Asked lizzy

## 2020-06-10 ENCOUNTER — HOSPITAL ENCOUNTER (OUTPATIENT)
Dept: MRI IMAGING | Facility: HOSPITAL | Age: 50
Discharge: HOME | End: 2020-06-10
Attending: INTERNAL MEDICINE
Payer: COMMERCIAL

## 2020-06-10 DIAGNOSIS — N64.89 NIPPLE CRUSTING: ICD-10-CM

## 2020-06-10 PROCEDURE — A9575 INJ GADOTERATE MEGLUMI 0.1ML: HCPCS

## 2020-06-10 PROCEDURE — 77049 MRI BREAST C-+ W/CAD BI: CPT | Performed by: INTERNAL MEDICINE

## 2020-06-25 ENCOUNTER — OFFICE VISIT (OUTPATIENT)
Dept: HEMATOLOGY/ONCOLOGY | Facility: HOSPITAL | Age: 50
End: 2020-06-25
Attending: INTERNAL MEDICINE
Payer: COMMERCIAL

## 2020-06-25 VITALS
WEIGHT: 233 LBS | BODY MASS INDEX: 37.01 KG/M2 | RESPIRATION RATE: 18 BRPM | OXYGEN SATURATION: 98 % | HEART RATE: 79 BPM | SYSTOLIC BLOOD PRESSURE: 118 MMHG | HEIGHT: 66.5 IN | DIASTOLIC BLOOD PRESSURE: 69 MMHG | TEMPERATURE: 99 F

## 2020-06-25 DIAGNOSIS — Z79.810 ENCOUNTER FOR MONITORING TAMOXIFEN THERAPY: ICD-10-CM

## 2020-06-25 DIAGNOSIS — Z51.81 ENCOUNTER FOR MONITORING TAMOXIFEN THERAPY: ICD-10-CM

## 2020-06-25 DIAGNOSIS — Z17.0 MALIGNANT NEOPLASM OF UPPER-OUTER QUADRANT OF LEFT BREAST IN FEMALE, ESTROGEN RECEPTOR POSITIVE (HCC): Primary | ICD-10-CM

## 2020-06-25 DIAGNOSIS — N64.89 NIPPLE CRUSTING: ICD-10-CM

## 2020-06-25 DIAGNOSIS — C50.412 MALIGNANT NEOPLASM OF UPPER-OUTER QUADRANT OF LEFT BREAST IN FEMALE, ESTROGEN RECEPTOR POSITIVE (HCC): Primary | ICD-10-CM

## 2020-06-25 PROCEDURE — 99214 OFFICE O/P EST MOD 30 MIN: CPT | Performed by: INTERNAL MEDICINE

## 2020-06-25 NOTE — PROGRESS NOTES
HPI     Josephine Henriquez is a 51year old female here for f/u of Malignant neoplasm of upper-outer quadrant of left breast in female, estrogen receptor positive (hcc)  (primary encounter diagnosis)  Encounter for monitoring tamoxifen therapy  Nipple cr Psychiatric/Behavioral: Positive for depression (well controlled.  ) and sleep disturbance (better taking trazodone).            Current Outpatient Medications   Medication Sig Dispense Refill   • TAMOXIFEN CITRATE 20 MG Oral Tab TAKE 1 TABLET BY MOUTH EVER Performed by Travis Murray MD at [address] [place] [address] MAIN OR   • BREAST SENTINEL LYMPH NODE BIOPSY Left 2018    Performed by Travis Murray MD at [address] [place] [address] MAIN OR   •      • LUMPECTOMY LEFT Left 10/29/2018   • MARISOL BIOPSY STEREO NODULE 2 SITE BILAT (CPT General: Patient is alert, not in acute distress. HEENT: EOMs intact. PERRL. Oropharynx is clear. Neck: No JVD. No palpable lymphadenopathy. Neck is supple. Chest: Clear to auscultation. Heart: Regular rate and rhythm.    Abdomen: Soft, non tender with No orders of the defined types were placed in this encounter.       PROCEDURE:  [facility] XIMENA 2D+3D DIAGNOSTIC [facility] BILAT (BEH=[id number])     COMPARISON: [facility], [facility] SURGICAL SPECIMEN LEFT EM (YKZ=[id number]), 10/29/2018, 12:26 PM.  [facility] DIAGNOSTIC CATEGORY 3--PROBABLY BENIGN FINDING. RECOMMENDATIONS:   SHORT TERM FOLLOW-UP DIAGNOSTIC MAMMOGRAM LEFT BREAST IN 6 MONTHS.                                         PLEASE NOTE: NORMAL MAMMOGRAM DOES NOT EXCLUDE THE POSSIBILITY OF BREAST CANC history of 2 sisters and a paternal grandmother with breast cancer. BREAST COMPOSITION:          CATEGORY b- There are scattered areas of fibroglandular density.      TECHNIQUE:    Breast MRI was performed using dynamic intravenous gadolinium infusion, A marker was placed over the area of intense itchiness per the patient. There is no abnormal enhancement at the site of the marker. The area of skin enhancement is more posterior to this.         Extra mammary findings limited evaluation of the liver and

## 2020-07-20 RX ORDER — GABAPENTIN 100 MG/1
100 CAPSULE ORAL NIGHTLY
Qty: 90 CAPSULE | Refills: 0 | Status: SHIPPED | OUTPATIENT
Start: 2020-07-20 | End: 2020-08-11 | Stop reason: ALTCHOICE

## 2020-08-03 ENCOUNTER — LAB ENCOUNTER (OUTPATIENT)
Dept: LAB | Age: 50
End: 2020-08-03
Attending: FAMILY MEDICINE
Payer: COMMERCIAL

## 2020-08-03 ENCOUNTER — OFFICE VISIT (OUTPATIENT)
Dept: FAMILY MEDICINE CLINIC | Facility: CLINIC | Age: 50
End: 2020-08-03
Payer: COMMERCIAL

## 2020-08-03 VITALS
SYSTOLIC BLOOD PRESSURE: 110 MMHG | DIASTOLIC BLOOD PRESSURE: 72 MMHG | TEMPERATURE: 97 F | HEIGHT: 66.5 IN | WEIGHT: 231 LBS | BODY MASS INDEX: 36.69 KG/M2 | HEART RATE: 70 BPM

## 2020-08-03 DIAGNOSIS — Z00.00 WELL ADULT EXAM: ICD-10-CM

## 2020-08-03 DIAGNOSIS — S39.012A LOW BACK STRAIN, INITIAL ENCOUNTER: ICD-10-CM

## 2020-08-03 DIAGNOSIS — Z13.1 SCREENING FOR DIABETES MELLITUS: ICD-10-CM

## 2020-08-03 DIAGNOSIS — R21 RASH AND NONSPECIFIC SKIN ERUPTION: Primary | ICD-10-CM

## 2020-08-03 LAB
ALBUMIN SERPL-MCNC: 3.5 G/DL (ref 3.4–5)
ALBUMIN/GLOB SERPL: 0.9 {RATIO} (ref 1–2)
ALP LIVER SERPL-CCNC: 46 U/L (ref 39–100)
ALT SERPL-CCNC: 18 U/L (ref 13–56)
ANION GAP SERPL CALC-SCNC: 6 MMOL/L (ref 0–18)
AST SERPL-CCNC: 10 U/L (ref 15–37)
BASOPHILS # BLD AUTO: 0.06 X10(3) UL (ref 0–0.2)
BASOPHILS NFR BLD AUTO: 1 %
BILIRUB SERPL-MCNC: 0.3 MG/DL (ref 0.1–2)
BUN BLD-MCNC: 11 MG/DL (ref 7–18)
BUN/CREAT SERPL: 15.5 (ref 10–20)
CALCIUM BLD-MCNC: 9.2 MG/DL (ref 8.5–10.1)
CHLORIDE SERPL-SCNC: 109 MMOL/L (ref 98–112)
CHOLEST SMN-MCNC: 215 MG/DL (ref ?–200)
CO2 SERPL-SCNC: 25 MMOL/L (ref 21–32)
CREAT BLD-MCNC: 0.71 MG/DL (ref 0.55–1.02)
DEPRECATED RDW RBC AUTO: 46.9 FL (ref 35.1–46.3)
EOSINOPHIL # BLD AUTO: 0.18 X10(3) UL (ref 0–0.7)
EOSINOPHIL NFR BLD AUTO: 2.9 %
ERYTHROCYTE [DISTWIDTH] IN BLOOD BY AUTOMATED COUNT: 14.1 % (ref 11–15)
EST. AVERAGE GLUCOSE BLD GHB EST-MCNC: 94 MG/DL (ref 68–126)
GLOBULIN PLAS-MCNC: 3.8 G/DL (ref 2.8–4.4)
GLUCOSE BLD-MCNC: 94 MG/DL (ref 70–99)
HBA1C MFR BLD HPLC: 4.9 % (ref ?–5.7)
HCT VFR BLD AUTO: 39.5 % (ref 35–48)
HDLC SERPL-MCNC: 51 MG/DL (ref 40–59)
HGB BLD-MCNC: 12.6 G/DL (ref 12–16)
IMM GRANULOCYTES # BLD AUTO: 0.02 X10(3) UL (ref 0–1)
IMM GRANULOCYTES NFR BLD: 0.3 %
LDLC SERPL CALC-MCNC: 109 MG/DL (ref ?–100)
LYMPHOCYTES # BLD AUTO: 1.57 X10(3) UL (ref 1–4)
LYMPHOCYTES NFR BLD AUTO: 25.3 %
M PROTEIN MFR SERPL ELPH: 7.3 G/DL (ref 6.4–8.2)
MCH RBC QN AUTO: 28.8 PG (ref 26–34)
MCHC RBC AUTO-ENTMCNC: 31.9 G/DL (ref 31–37)
MCV RBC AUTO: 90.4 FL (ref 80–100)
MONOCYTES # BLD AUTO: 0.39 X10(3) UL (ref 0.1–1)
MONOCYTES NFR BLD AUTO: 6.3 %
NEUTROPHILS # BLD AUTO: 3.98 X10 (3) UL (ref 1.5–7.7)
NEUTROPHILS # BLD AUTO: 3.98 X10(3) UL (ref 1.5–7.7)
NEUTROPHILS NFR BLD AUTO: 64.2 %
NONHDLC SERPL-MCNC: 164 MG/DL (ref ?–130)
OSMOLALITY SERPL CALC.SUM OF ELEC: 289 MOSM/KG (ref 275–295)
PATIENT FASTING Y/N/NP: YES
PATIENT FASTING Y/N/NP: YES
PLATELET # BLD AUTO: 304 10(3)UL (ref 150–450)
POTASSIUM SERPL-SCNC: 4.4 MMOL/L (ref 3.5–5.1)
RBC # BLD AUTO: 4.37 X10(6)UL (ref 3.8–5.3)
SODIUM SERPL-SCNC: 140 MMOL/L (ref 136–145)
TRIGL SERPL-MCNC: 277 MG/DL (ref 30–149)
TSI SER-ACNC: 0.95 MIU/ML (ref 0.36–3.74)
VIT B12 SERPL-MCNC: 418 PG/ML (ref 193–986)
VLDLC SERPL CALC-MCNC: 55 MG/DL (ref 0–30)
WBC # BLD AUTO: 6.2 X10(3) UL (ref 4–11)

## 2020-08-03 PROCEDURE — 3074F SYST BP LT 130 MM HG: CPT | Performed by: FAMILY MEDICINE

## 2020-08-03 PROCEDURE — 83036 HEMOGLOBIN GLYCOSYLATED A1C: CPT

## 2020-08-03 PROCEDURE — 3008F BODY MASS INDEX DOCD: CPT | Performed by: FAMILY MEDICINE

## 2020-08-03 PROCEDURE — 85025 COMPLETE CBC W/AUTO DIFF WBC: CPT

## 2020-08-03 PROCEDURE — 80053 COMPREHEN METABOLIC PANEL: CPT

## 2020-08-03 PROCEDURE — 99214 OFFICE O/P EST MOD 30 MIN: CPT | Performed by: FAMILY MEDICINE

## 2020-08-03 PROCEDURE — 80061 LIPID PANEL: CPT

## 2020-08-03 PROCEDURE — 84443 ASSAY THYROID STIM HORMONE: CPT

## 2020-08-03 PROCEDURE — 3078F DIAST BP <80 MM HG: CPT | Performed by: FAMILY MEDICINE

## 2020-08-03 PROCEDURE — 82607 VITAMIN B-12: CPT

## 2020-08-03 PROCEDURE — 82306 VITAMIN D 25 HYDROXY: CPT

## 2020-08-03 PROCEDURE — 36415 COLL VENOUS BLD VENIPUNCTURE: CPT

## 2020-08-03 RX ORDER — CLOTRIMAZOLE AND BETAMETHASONE DIPROPIONATE 10; .64 MG/G; MG/G
1 CREAM TOPICAL 2 TIMES DAILY
Qty: 60 G | Refills: 1 | Status: SHIPPED | OUTPATIENT
Start: 2020-08-03

## 2020-08-03 RX ORDER — METAXALONE 800 MG/1
400 TABLET ORAL 3 TIMES DAILY
Qty: 30 TABLET | Refills: 0 | Status: SHIPPED | OUTPATIENT
Start: 2020-08-03 | End: 2020-12-17

## 2020-08-03 NOTE — PROGRESS NOTES
HPI:    Patient ID: Iban Arnett is a 51year old female.     HPI  Patient presents with:  Rash: under left breast possible ringworm   Low Back Pain: pt states she was pulling some branches this weekend     Patient presents with a rash on her left gonzalo risperiDONE 0.25 MG Oral Tab      • VYVANSE 50 MG Oral Cap TAKE 1 CAPSULE BY MOUTH ONCE DAILY IN THE MORNING  0   • ALPRAZolam 0.25 MG Oral Tab Take 1 tablet (0.25 mg total) by mouth nightly as needed for Anxiety.  Not to take with any muscle relaxer 30 tab back strain, initial encounter  Reviewed ice compresses and stretches. - Metaxalone 800 MG Oral Tab; Take 0.5 tablets (400 mg total) by mouth 3 (three) times daily. Dispense: 30 tablet; Refill: 0    3.  Well adult exam  Return to clinic for complete physi

## 2020-08-05 LAB — 25(OH)D3 SERPL-MCNC: 30 NG/ML (ref 30–100)

## 2020-08-11 ENCOUNTER — OFFICE VISIT (OUTPATIENT)
Dept: FAMILY MEDICINE CLINIC | Facility: CLINIC | Age: 50
End: 2020-08-11
Payer: COMMERCIAL

## 2020-08-11 VITALS
DIASTOLIC BLOOD PRESSURE: 81 MMHG | SYSTOLIC BLOOD PRESSURE: 121 MMHG | HEART RATE: 77 BPM | TEMPERATURE: 99 F | HEIGHT: 66.5 IN | WEIGHT: 237 LBS | BODY MASS INDEX: 37.64 KG/M2

## 2020-08-11 DIAGNOSIS — Z01.419 ENCOUNTER FOR GYNECOLOGICAL EXAMINATION: ICD-10-CM

## 2020-08-11 DIAGNOSIS — Z80.0 FH: COLON CANCER: ICD-10-CM

## 2020-08-11 DIAGNOSIS — E55.9 VITAMIN D DEFICIENCY: ICD-10-CM

## 2020-08-11 DIAGNOSIS — E53.8 VITAMIN B12 DEFICIENCY: ICD-10-CM

## 2020-08-11 DIAGNOSIS — F41.9 ANXIETY AND DEPRESSION: ICD-10-CM

## 2020-08-11 DIAGNOSIS — Z00.00 WELL ADULT EXAM: Primary | ICD-10-CM

## 2020-08-11 DIAGNOSIS — Z80.3 FH: BREAST CANCER IN FIRST DEGREE RELATIVE: ICD-10-CM

## 2020-08-11 DIAGNOSIS — Z12.11 COLON CANCER SCREENING: ICD-10-CM

## 2020-08-11 DIAGNOSIS — F32.A ANXIETY AND DEPRESSION: ICD-10-CM

## 2020-08-11 DIAGNOSIS — E66.9 OBESITY (BMI 30-39.9): ICD-10-CM

## 2020-08-11 DIAGNOSIS — E78.1 HYPERTRIGLYCERIDEMIA: ICD-10-CM

## 2020-08-11 PROBLEM — R53.82 CHRONIC FATIGUE: Status: RESOLVED | Noted: 2018-10-04 | Resolved: 2020-08-11

## 2020-08-11 PROBLEM — E66.01 MORBID OBESITY WITH BMI OF 40.0-44.9, ADULT (HCC): Status: RESOLVED | Noted: 2018-10-04 | Resolved: 2020-08-11

## 2020-08-11 PROCEDURE — 3008F BODY MASS INDEX DOCD: CPT | Performed by: FAMILY MEDICINE

## 2020-08-11 PROCEDURE — 3074F SYST BP LT 130 MM HG: CPT | Performed by: FAMILY MEDICINE

## 2020-08-11 PROCEDURE — 3079F DIAST BP 80-89 MM HG: CPT | Performed by: FAMILY MEDICINE

## 2020-08-11 PROCEDURE — 99396 PREV VISIT EST AGE 40-64: CPT | Performed by: FAMILY MEDICINE

## 2020-08-11 RX ORDER — EPINEPHRINE 0.3 MG/.3ML
INJECTION SUBCUTANEOUS
Qty: 1 EACH | Refills: 1 | Status: SHIPPED | OUTPATIENT
Start: 2020-08-11
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
## 2020-09-14 ENCOUNTER — OFFICE VISIT (OUTPATIENT)
Dept: SURGERY | Facility: CLINIC | Age: 50
End: 2020-09-14
Payer: COMMERCIAL

## 2020-09-14 VITALS — HEART RATE: 83 BPM | OXYGEN SATURATION: 97 %

## 2020-09-14 DIAGNOSIS — Z17.0 MALIGNANT NEOPLASM OF UPPER-OUTER QUADRANT OF LEFT BREAST IN FEMALE, ESTROGEN RECEPTOR POSITIVE (HCC): Primary | ICD-10-CM

## 2020-09-14 DIAGNOSIS — C50.412 MALIGNANT NEOPLASM OF UPPER-OUTER QUADRANT OF LEFT BREAST IN FEMALE, ESTROGEN RECEPTOR POSITIVE (HCC): Primary | ICD-10-CM

## 2020-09-14 PROCEDURE — 99214 OFFICE O/P EST MOD 30 MIN: CPT | Performed by: SURGERY

## 2020-09-14 NOTE — PROGRESS NOTES
Breast Surgery Surveillance Visit    Diagnosis: Left breast cancer status post lumpectomy on October 29, 2018 and status post sentinel lymph node biopsy in November 12, 2018.     Stage: Cancer Staging  Malignant neoplasm of upper-outer quadrant of left marco patient's pain symptoms and was consistent with dense breast tissue corresponding to the asymmetry seen on mammogram with a visible biopsy tract for which a 3 month surveillance was recommended to document stability.  She returned on February 6, 2015 for th demonstrated a 4 mm focus of ductal carcinoma in situ that was ER/DE positive. Of note, the patient did undergo a comprehensive genetic evaluation in May 2015 that was negative.   She elected for breast conserving therapy which took place without complicat cumulative breastfeeding history of 4 months, last many years ago. She achieved menarche at age 16 and LMP     She has a history of oral contraceptive use for 20 years, last in 2011. She denies infertility treatment to achieve pregnancy.     Medications:  unknown primary    •  Cancer  Maternal Grandfather  66        prostate/bladder ca    She is not of Ashkenazi Restorationism ancestry.     Social History:    Alcohol Use:  Yes    Comment:  socially        Smoking status:  Former Smoker  0.50 Packs/Day  For 10.00 Yea urine stream, blood in the urine or vaginal/penile discharge. Skin:     The patient denies rash, itching, skin lesions, dry skin, change in skin color or change in moles.      Hematologic/Lymphatic:  The patient denies easily bruising or bleeding or pers Left breast biopsy and an increased risk for breast cancer based on multiple risk assessment models with new diagnosis of left breast cancer status post lumpectomy.       Discussion and Plan:  I had a discussion with the patient regarding her breast exam.

## 2020-09-22 ENCOUNTER — HOSPITAL ENCOUNTER (OUTPATIENT)
Dept: MAMMOGRAPHY | Facility: HOSPITAL | Age: 50
Discharge: HOME | End: 2020-09-22
Attending: INTERNAL MEDICINE
Payer: COMMERCIAL

## 2020-09-22 DIAGNOSIS — C50.412 MALIGNANT NEOPLASM OF UPPER-OUTER QUADRANT OF LEFT BREAST IN FEMALE, ESTROGEN RECEPTOR POSITIVE (HCC): ICD-10-CM

## 2020-09-22 DIAGNOSIS — Z17.0 MALIGNANT NEOPLASM OF UPPER-OUTER QUADRANT OF LEFT BREAST IN FEMALE, ESTROGEN RECEPTOR POSITIVE (HCC): ICD-10-CM

## 2020-11-27 ENCOUNTER — APPOINTMENT (OUTPATIENT)
Dept: HEMATOLOGY/ONCOLOGY | Facility: HOSPITAL | Age: 50
End: 2020-11-27
Attending: INTERNAL MEDICINE
Payer: COMMERCIAL

## 2020-11-27 ENCOUNTER — HOSPITAL ENCOUNTER (OUTPATIENT)
Dept: MAMMOGRAPHY | Facility: HOSPITAL | Age: 50
Discharge: HOME | End: 2020-11-27
Attending: INTERNAL MEDICINE
Payer: COMMERCIAL

## 2020-11-27 PROCEDURE — 77065 DX MAMMO INCL CAD UNI: CPT | Performed by: INTERNAL MEDICINE

## 2020-11-27 PROCEDURE — 77061 BREAST TOMOSYNTHESIS UNI: CPT | Performed by: INTERNAL MEDICINE

## 2020-12-17 ENCOUNTER — OFFICE VISIT (OUTPATIENT)
Dept: FAMILY MEDICINE CLINIC | Facility: CLINIC | Age: 50
End: 2020-12-17
Payer: COMMERCIAL

## 2020-12-17 VITALS
TEMPERATURE: 99 F | DIASTOLIC BLOOD PRESSURE: 88 MMHG | HEIGHT: 66.5 IN | HEART RATE: 98 BPM | BODY MASS INDEX: 35.89 KG/M2 | SYSTOLIC BLOOD PRESSURE: 134 MMHG | WEIGHT: 226 LBS

## 2020-12-17 DIAGNOSIS — N76.0 ACUTE VAGINITIS: Primary | ICD-10-CM

## 2020-12-17 DIAGNOSIS — N89.8 VAGINAL DISCHARGE: ICD-10-CM

## 2020-12-17 PROCEDURE — 3008F BODY MASS INDEX DOCD: CPT | Performed by: FAMILY MEDICINE

## 2020-12-17 PROCEDURE — 3075F SYST BP GE 130 - 139MM HG: CPT | Performed by: FAMILY MEDICINE

## 2020-12-17 PROCEDURE — 99213 OFFICE O/P EST LOW 20 MIN: CPT | Performed by: FAMILY MEDICINE

## 2020-12-17 PROCEDURE — 3079F DIAST BP 80-89 MM HG: CPT | Performed by: FAMILY MEDICINE

## 2020-12-17 RX ORDER — DEXTROAMPHETAMINE SACCHARATE, AMPHETAMINE ASPARTATE, DEXTROAMPHETAMINE SULFATE AND AMPHETAMINE SULFATE 1.25; 1.25; 1.25; 1.25 MG/1; MG/1; MG/1; MG/1
TABLET ORAL
COMMUNITY
Start: 2020-11-24

## 2020-12-17 RX ORDER — FLUCONAZOLE 150 MG/1
150 TABLET ORAL ONCE
Qty: 1 TABLET | Refills: 0 | Status: SHIPPED | OUTPATIENT
Start: 2020-12-17 | End: 2020-12-17

## 2020-12-17 RX ORDER — CARIPRAZINE 1.5 MG/1
CAPSULE, GELATIN COATED ORAL
COMMUNITY
Start: 2020-12-14

## 2020-12-17 RX ORDER — DEXTROAMPHETAMINE SULFATE, DEXTROAMPHETAMINE SACCHARATE, AMPHETAMINE SULFATE AND AMPHETAMINE ASPARTATE 7.5; 7.5; 7.5; 7.5 MG/1; MG/1; MG/1; MG/1
CAPSULE, EXTENDED RELEASE ORAL
COMMUNITY
Start: 2020-11-24

## 2020-12-17 NOTE — PROGRESS NOTES
HPI:    Patient ID: Mary Huffman is a 53year old female. HPI  Patient presents with:  Vaginal Problem: irritation and itching X 2 weeks     Patient with complains of having vaginal itching for the last 2 weeks.   She states she did change a soap a Tab TAKE 1 TABLET BY MOUTH EVERY DAY 90 tablet 2   • traZODone HCl 100 MG Oral Tab Take 100 mg by mouth. At Bedtime  1   • ALPRAZolam 0.25 MG Oral Tab Take 1 tablet (0.25 mg total) by mouth nightly as needed for Anxiety.  Not to take with any muscle relaxer total) by mouth once for 1 dose. Dispense: 1 tablet;  Refill: 0  - GENITAL VAGINOSIS SCREEN; Future      Orders Placed This Encounter      Gentital vaginosis screen [E]      Meds This Visit:  Requested Prescriptions     Signed Prescriptions Disp Refills

## 2020-12-17 NOTE — PATIENT INSTRUCTIONS
Preventing Vaginitis     Use mild, unscented soap when you bathe or shower to avoid irritating your vagina.     Vaginitis is irritation or infection of the vagina or the outside opening of it (vulva). Vaginitis can be caused by bacteria, viruses, parasite when it’s warm and damp. · Don’t wear tight pants. And don’t wear tights, leggings, or hose without a cotton crotch. These types of clothing trap warmth and moisture. · Wear cotton underwear. Willye So lets air circulate around the vagina.     Symptoms of va

## 2021-01-12 ENCOUNTER — APPOINTMENT (OUTPATIENT)
Dept: HEMATOLOGY/ONCOLOGY | Facility: HOSPITAL | Age: 51
End: 2021-01-12
Attending: INTERNAL MEDICINE
Payer: COMMERCIAL

## 2021-01-21 ENCOUNTER — OFFICE VISIT (OUTPATIENT)
Dept: HEMATOLOGY/ONCOLOGY | Facility: HOSPITAL | Age: 51
End: 2021-01-21
Attending: INTERNAL MEDICINE
Payer: COMMERCIAL

## 2021-01-21 VITALS
DIASTOLIC BLOOD PRESSURE: 74 MMHG | BODY MASS INDEX: 37.01 KG/M2 | HEIGHT: 66.5 IN | SYSTOLIC BLOOD PRESSURE: 141 MMHG | HEART RATE: 94 BPM | TEMPERATURE: 98 F | OXYGEN SATURATION: 100 % | WEIGHT: 233 LBS | RESPIRATION RATE: 18 BRPM

## 2021-01-21 DIAGNOSIS — Z17.0 MALIGNANT NEOPLASM OF UPPER-OUTER QUADRANT OF LEFT BREAST IN FEMALE, ESTROGEN RECEPTOR POSITIVE (HCC): Primary | ICD-10-CM

## 2021-01-21 DIAGNOSIS — Z79.810 ENCOUNTER FOR MONITORING TAMOXIFEN THERAPY: ICD-10-CM

## 2021-01-21 DIAGNOSIS — Z51.81 ENCOUNTER FOR MONITORING TAMOXIFEN THERAPY: ICD-10-CM

## 2021-01-21 DIAGNOSIS — C50.412 MALIGNANT NEOPLASM OF UPPER-OUTER QUADRANT OF LEFT BREAST IN FEMALE, ESTROGEN RECEPTOR POSITIVE (HCC): Primary | ICD-10-CM

## 2021-01-21 PROCEDURE — 99214 OFFICE O/P EST MOD 30 MIN: CPT | Performed by: INTERNAL MEDICINE

## 2021-01-21 NOTE — PROGRESS NOTES
VU Cutler Ely Sullivan is a 53year old female here for f/u of Malignant neoplasm of upper-outer quadrant of left breast in female, estrogen receptor positive (hcc)  (primary encounter diagnosis)  Encounter for monitoring tamoxifen therapy      Henry HCl 100 MG Oral Tab Take 1 tablet (100 mg total) by mouth daily. (Patient taking differently: Take 125 mg by mouth daily.  ) 90 tablet 0   • Multiple Vitamin (MULTIVITAMINS) Oral Cap Take 1 capsule by mouth daily.        Allergies:     Bactrim [Sulfametho* • Breast Cancer Sister 46         PHYSICAL EXAM:    /74 (BP Location: Left arm, Patient Position: Sitting, Cuff Size: large)   Pulse 94   Temp 98.4 °F (36.9 °C) (Oral)   Resp 18   Ht 1.689 m (5' 6.5\")   Wt 105.7 kg (233 lb)   LMP 11/26/2020 (Appro premenopausal.  Patient to complete tamoxifen in February of 2024. L breast mammogram in November 2020 BIRADS-3. Due for B mammogram May 2021.   Will order diagnostic and include the axilla on the R due to breast tissue that is palpable with US evaluat mammogram.     BI-RADS CATEGORY:     DIAGNOSTIC CATEGORY 2--BENIGN FINDING:       RECOMMENDATIONS:   SHORT TERM FOLLOW-UP DIAGNOSTIC MAMMOGRAM BILATERAL BREASTS IN 6 MONTHS.                                Your patient's answers to the health and family hist

## 2021-02-04 RX ORDER — TAMOXIFEN CITRATE 20 MG/1
20 TABLET ORAL DAILY
Qty: 90 TABLET | Refills: 2 | Status: SHIPPED | OUTPATIENT
Start: 2021-02-04 | End: 2021-10-31

## 2021-04-21 ENCOUNTER — TELEPHONE (OUTPATIENT)
Dept: FAMILY MEDICINE CLINIC | Facility: CLINIC | Age: 51
End: 2021-04-21

## 2021-04-21 RX ORDER — CEPHALEXIN 500 MG/1
500 CAPSULE ORAL 3 TIMES DAILY
Qty: 15 CAPSULE | Refills: 0 | Status: SHIPPED | OUTPATIENT
Start: 2021-04-21 | End: 2021-04-26

## 2021-04-21 NOTE — TELEPHONE ENCOUNTER
Spoke with patient, (  verified ) informed of 's   instructions below    Patient verbalizes understanding and agrees.

## 2021-04-21 NOTE — TELEPHONE ENCOUNTER
I will go ahead and send a prescription. Monitor symptoms. I am holding off any urine testing due to her exposure to Covid. Ideally would have like to have urine samples done  If symptoms persist would recommend urine testing.

## 2021-04-21 NOTE — TELEPHONE ENCOUNTER
Patient calling reports symptoms of UTI, hx of UTI   Reports  Heavy / full bladder, frequency, pain with urination ,onset of 2 days     Was COVID tested  this morning due to exposure there fore does not want to come to office or go to lab without having he

## 2021-05-27 ENCOUNTER — APPOINTMENT (OUTPATIENT)
Dept: HEMATOLOGY/ONCOLOGY | Facility: HOSPITAL | Age: 51
End: 2021-05-27
Attending: INTERNAL MEDICINE
Payer: COMMERCIAL

## 2021-05-28 ENCOUNTER — HOSPITAL ENCOUNTER (OUTPATIENT)
Dept: ULTRASOUND IMAGING | Facility: HOSPITAL | Age: 51
Discharge: HOME | End: 2021-05-28
Attending: INTERNAL MEDICINE
Payer: COMMERCIAL

## 2021-05-28 ENCOUNTER — HOSPITAL ENCOUNTER (OUTPATIENT)
Dept: MAMMOGRAPHY | Facility: HOSPITAL | Age: 51
Discharge: HOME | End: 2021-05-28
Attending: INTERNAL MEDICINE
Payer: COMMERCIAL

## 2021-05-28 DIAGNOSIS — Z17.0 MALIGNANT NEOPLASM OF UPPER-OUTER QUADRANT OF LEFT BREAST IN FEMALE, ESTROGEN RECEPTOR POSITIVE (HCC): ICD-10-CM

## 2021-05-28 DIAGNOSIS — C50.412 MALIGNANT NEOPLASM OF UPPER-OUTER QUADRANT OF LEFT BREAST IN FEMALE, ESTROGEN RECEPTOR POSITIVE (HCC): ICD-10-CM

## 2021-05-28 PROCEDURE — 77066 DX MAMMO INCL CAD BI: CPT | Performed by: INTERNAL MEDICINE

## 2021-05-28 PROCEDURE — 77062 BREAST TOMOSYNTHESIS BI: CPT | Performed by: INTERNAL MEDICINE

## 2021-05-28 PROCEDURE — 76642 ULTRASOUND BREAST LIMITED: CPT | Performed by: INTERNAL MEDICINE

## 2021-06-22 ENCOUNTER — OFFICE VISIT (OUTPATIENT)
Dept: HEMATOLOGY/ONCOLOGY | Facility: HOSPITAL | Age: 51
End: 2021-06-22
Attending: INTERNAL MEDICINE
Payer: COMMERCIAL

## 2021-06-22 VITALS
HEIGHT: 66.5 IN | OXYGEN SATURATION: 98 % | BODY MASS INDEX: 37.45 KG/M2 | DIASTOLIC BLOOD PRESSURE: 87 MMHG | RESPIRATION RATE: 18 BRPM | SYSTOLIC BLOOD PRESSURE: 147 MMHG | WEIGHT: 235.81 LBS | HEART RATE: 100 BPM | TEMPERATURE: 97 F

## 2021-06-22 DIAGNOSIS — Z79.810 ENCOUNTER FOR MONITORING TAMOXIFEN THERAPY: ICD-10-CM

## 2021-06-22 DIAGNOSIS — C50.412 MALIGNANT NEOPLASM OF UPPER-OUTER QUADRANT OF LEFT BREAST IN FEMALE, ESTROGEN RECEPTOR POSITIVE (HCC): Primary | ICD-10-CM

## 2021-06-22 DIAGNOSIS — Z17.0 MALIGNANT NEOPLASM OF UPPER-OUTER QUADRANT OF LEFT BREAST IN FEMALE, ESTROGEN RECEPTOR POSITIVE (HCC): Primary | ICD-10-CM

## 2021-06-22 DIAGNOSIS — Z51.81 ENCOUNTER FOR MONITORING TAMOXIFEN THERAPY: ICD-10-CM

## 2021-06-22 PROCEDURE — 99214 OFFICE O/P EST MOD 30 MIN: CPT | Performed by: INTERNAL MEDICINE

## 2021-06-22 NOTE — PROGRESS NOTES
VU Dunaway Urmila Riggs is a 53year old female here for f/u of Malignant neoplasm of upper-outer quadrant of left breast in female, estrogen receptor positive (hcc)  (primary encounter diagnosis)  Encounter for monitoring tamoxifen therapy      Henry Oral Tab Take 1 tablet (100 mg total) by mouth daily. 90 tablet 0   • Multiple Vitamin (MULTIVITAMINS) Oral Cap Take 1 capsule by mouth daily. • clotrimazole-betamethasone 1-0.05 % External Cream Apply 1 Application topically 2 (two) times daily.  (Jessie arm, Patient Position: Sitting, Cuff Size: large)   Pulse 100   Temp 97.3 °F (36.3 °C) (Temporal)   Resp 18   Ht 1.689 m (5' 6.5\")   Wt 107 kg (235 lb 12.8 oz)   LMP 11/26/2020 (Approximate)   SpO2 98%   BMI 37.49 kg/m²   Wt Readings from Last 6 Encounter B mammogram in May 2021 BIRADS-2, next due in May 2022. No orders of the defined types were placed in this encounter. Results From Past 48 Hours:  No results found for this or any previous visit (from the past 48 hour(s)).     MDM Moderate magnification views were obtained. A marker was placed demarcating patient's right axillary palpable abnormality. No mammographic abnormality is seen corresponding to this marker.      Stable post lumpectomy changes are seen in the left breast unchanged w recommendation. PLEASE NOTE: NORMAL MAMMOGRAM DOES NOT EXCLUDE THE POSSIBILITY OF BREAST CANCER. A CLINICALLY SUSPICIOUS PALPABLE LUMP SHOULD BE BIOPSIED.        For patients over the age of 36, the target due date for the patient's next [facility]

## 2021-10-31 RX ORDER — TAMOXIFEN CITRATE 20 MG/1
TABLET ORAL
Qty: 90 TABLET | Refills: 2 | Status: SHIPPED | OUTPATIENT
Start: 2021-10-31 | End: 2022-02-09

## 2021-12-22 ENCOUNTER — APPOINTMENT (OUTPATIENT)
Dept: HEMATOLOGY/ONCOLOGY | Facility: HOSPITAL | Age: 51
End: 2021-12-22
Attending: INTERNAL MEDICINE
Payer: COMMERCIAL

## 2021-12-30 ENCOUNTER — APPOINTMENT (OUTPATIENT)
Dept: HEMATOLOGY/ONCOLOGY | Facility: HOSPITAL | Age: 51
End: 2021-12-30
Attending: INTERNAL MEDICINE
Payer: COMMERCIAL

## 2022-01-21 ENCOUNTER — APPOINTMENT (OUTPATIENT)
Dept: HEMATOLOGY/ONCOLOGY | Facility: HOSPITAL | Age: 52
End: 2022-01-21
Attending: INTERNAL MEDICINE
Payer: COMMERCIAL

## 2022-02-09 ENCOUNTER — OFFICE VISIT (OUTPATIENT)
Dept: HEMATOLOGY/ONCOLOGY | Facility: HOSPITAL | Age: 52
End: 2022-02-09
Attending: INTERNAL MEDICINE
Payer: COMMERCIAL

## 2022-02-09 VITALS
RESPIRATION RATE: 18 BRPM | SYSTOLIC BLOOD PRESSURE: 131 MMHG | DIASTOLIC BLOOD PRESSURE: 82 MMHG | BODY MASS INDEX: 39.86 KG/M2 | HEIGHT: 66.5 IN | OXYGEN SATURATION: 99 % | TEMPERATURE: 98 F | WEIGHT: 251 LBS | HEART RATE: 91 BPM

## 2022-02-09 DIAGNOSIS — Z12.31 SCREENING MAMMOGRAM, ENCOUNTER FOR: ICD-10-CM

## 2022-02-09 DIAGNOSIS — Z17.0 MALIGNANT NEOPLASM OF UPPER-OUTER QUADRANT OF LEFT BREAST IN FEMALE, ESTROGEN RECEPTOR POSITIVE (HCC): Primary | ICD-10-CM

## 2022-02-09 DIAGNOSIS — C50.412 MALIGNANT NEOPLASM OF UPPER-OUTER QUADRANT OF LEFT BREAST IN FEMALE, ESTROGEN RECEPTOR POSITIVE (HCC): Primary | ICD-10-CM

## 2022-02-09 DIAGNOSIS — Z79.810 ENCOUNTER FOR MONITORING TAMOXIFEN THERAPY: ICD-10-CM

## 2022-02-09 DIAGNOSIS — Z51.81 ENCOUNTER FOR MONITORING TAMOXIFEN THERAPY: ICD-10-CM

## 2022-02-09 PROCEDURE — 99214 OFFICE O/P EST MOD 30 MIN: CPT | Performed by: INTERNAL MEDICINE

## 2022-02-09 RX ORDER — TAMOXIFEN CITRATE 20 MG/1
20 TABLET ORAL DAILY
Qty: 90 TABLET | Refills: 3 | Status: SHIPPED | OUTPATIENT
Start: 2022-02-09

## 2022-02-14 ENCOUNTER — TELEPHONE (OUTPATIENT)
Dept: FAMILY MEDICINE CLINIC | Facility: CLINIC | Age: 52
End: 2022-02-14

## 2022-02-14 DIAGNOSIS — Z12.11 COLON CANCER SCREENING: Primary | ICD-10-CM

## 2022-02-24 ENCOUNTER — LAB ENCOUNTER (OUTPATIENT)
Dept: LAB | Age: 52
End: 2022-02-24
Attending: FAMILY MEDICINE
Payer: COMMERCIAL

## 2022-02-24 ENCOUNTER — OFFICE VISIT (OUTPATIENT)
Dept: FAMILY MEDICINE CLINIC | Facility: CLINIC | Age: 52
End: 2022-02-24
Payer: COMMERCIAL

## 2022-02-24 VITALS
TEMPERATURE: 98 F | BODY MASS INDEX: 39.54 KG/M2 | DIASTOLIC BLOOD PRESSURE: 91 MMHG | HEART RATE: 94 BPM | WEIGHT: 249 LBS | HEIGHT: 66.5 IN | SYSTOLIC BLOOD PRESSURE: 131 MMHG

## 2022-02-24 DIAGNOSIS — E66.9 OBESITY (BMI 30-39.9): ICD-10-CM

## 2022-02-24 DIAGNOSIS — N92.0 MENORRHAGIA WITH REGULAR CYCLE: ICD-10-CM

## 2022-02-24 DIAGNOSIS — E78.1 HYPERTRIGLYCERIDEMIA: ICD-10-CM

## 2022-02-24 DIAGNOSIS — Z00.00 WELL ADULT EXAM: ICD-10-CM

## 2022-02-24 DIAGNOSIS — Z13.1 SCREENING FOR DIABETES MELLITUS: ICD-10-CM

## 2022-02-24 DIAGNOSIS — E55.9 VITAMIN D DEFICIENCY: ICD-10-CM

## 2022-02-24 DIAGNOSIS — F32.A ANXIETY AND DEPRESSION: ICD-10-CM

## 2022-02-24 DIAGNOSIS — Z80.3 FH: BREAST CANCER IN FIRST DEGREE RELATIVE: ICD-10-CM

## 2022-02-24 DIAGNOSIS — Z17.0 MALIGNANT NEOPLASM OF UPPER-OUTER QUADRANT OF LEFT BREAST IN FEMALE, ESTROGEN RECEPTOR POSITIVE (HCC): ICD-10-CM

## 2022-02-24 DIAGNOSIS — Z00.00 WELL ADULT EXAM: Primary | ICD-10-CM

## 2022-02-24 DIAGNOSIS — Z12.11 COLON CANCER SCREENING: ICD-10-CM

## 2022-02-24 DIAGNOSIS — C50.412 MALIGNANT NEOPLASM OF UPPER-OUTER QUADRANT OF LEFT BREAST IN FEMALE, ESTROGEN RECEPTOR POSITIVE (HCC): ICD-10-CM

## 2022-02-24 DIAGNOSIS — F41.9 ANXIETY AND DEPRESSION: ICD-10-CM

## 2022-02-24 DIAGNOSIS — E53.8 VITAMIN B12 DEFICIENCY: ICD-10-CM

## 2022-02-24 DIAGNOSIS — Z23 NEED FOR SHINGLES VACCINE: ICD-10-CM

## 2022-02-24 DIAGNOSIS — Z01.419 ENCOUNTER FOR GYNECOLOGICAL EXAMINATION: ICD-10-CM

## 2022-02-24 DIAGNOSIS — E78.2 HYPERCHOLESTEROLEMIA WITH HYPERTRIGLYCERIDEMIA: ICD-10-CM

## 2022-02-24 DIAGNOSIS — R03.0 ELEVATED BLOOD PRESSURE READING: ICD-10-CM

## 2022-02-24 PROBLEM — Z63.9 FAMILY DYSFUNCTION: Status: RESOLVED | Noted: 2019-05-04 | Resolved: 2022-02-24

## 2022-02-24 PROBLEM — N64.4 BREAST PAIN, LEFT: Status: RESOLVED | Noted: 2020-04-24 | Resolved: 2022-02-24

## 2022-02-24 PROBLEM — R42 VERTIGO: Status: RESOLVED | Noted: 2017-01-25 | Resolved: 2022-02-24

## 2022-02-24 PROBLEM — F32.9 REACTIVE DEPRESSION: Status: RESOLVED | Noted: 2018-10-04 | Resolved: 2022-02-24

## 2022-02-24 PROBLEM — Z12.31 SCREENING MAMMOGRAM, ENCOUNTER FOR: Status: RESOLVED | Noted: 2022-02-09 | Resolved: 2022-02-24

## 2022-02-24 PROBLEM — M72.2 PLANTAR FASCIITIS, BILATERAL: Status: RESOLVED | Noted: 2018-04-07 | Resolved: 2022-02-24

## 2022-02-24 PROBLEM — Z80.0 FH: COLON CANCER: Status: RESOLVED | Noted: 2019-02-16 | Resolved: 2022-02-24

## 2022-02-24 PROBLEM — D22.5 NEVUS OF BUTTOCK: Status: RESOLVED | Noted: 2018-04-07 | Resolved: 2022-02-24

## 2022-02-24 LAB
ALBUMIN SERPL-MCNC: 3.9 G/DL (ref 3.4–5)
ALBUMIN/GLOB SERPL: 1.2 {RATIO} (ref 1–2)
ALP LIVER SERPL-CCNC: 60 U/L
ALT SERPL-CCNC: 20 U/L
ANION GAP SERPL CALC-SCNC: 9 MMOL/L (ref 0–18)
AST SERPL-CCNC: 13 U/L (ref 15–37)
BASOPHILS # BLD AUTO: 0.09 X10(3) UL (ref 0–0.2)
BASOPHILS NFR BLD AUTO: 1 %
BILIRUB SERPL-MCNC: 0.4 MG/DL (ref 0.1–2)
BUN BLD-MCNC: 13 MG/DL (ref 7–18)
BUN/CREAT SERPL: 17.6 (ref 10–20)
CALCIUM BLD-MCNC: 9 MG/DL (ref 8.5–10.1)
CHLORIDE SERPL-SCNC: 109 MMOL/L (ref 98–112)
CHOLEST SERPL-MCNC: 169 MG/DL (ref ?–200)
CO2 SERPL-SCNC: 23 MMOL/L (ref 21–32)
CREAT BLD-MCNC: 0.74 MG/DL
DEPRECATED RDW RBC AUTO: 49 FL (ref 35.1–46.3)
EOSINOPHIL # BLD AUTO: 0.14 X10(3) UL (ref 0–0.7)
EOSINOPHIL NFR BLD AUTO: 1.5 %
ERYTHROCYTE [DISTWIDTH] IN BLOOD BY AUTOMATED COUNT: 14.6 % (ref 11–15)
EST. AVERAGE GLUCOSE BLD GHB EST-MCNC: 100 MG/DL (ref 68–126)
FASTING PATIENT LIPID ANSWER: YES
FASTING STATUS PATIENT QL REPORTED: YES
GLOBULIN PLAS-MCNC: 3.3 G/DL (ref 2.8–4.4)
GLUCOSE BLD-MCNC: 83 MG/DL (ref 70–99)
HBA1C MFR BLD: 5.1 % (ref ?–5.7)
HCT VFR BLD AUTO: 39.6 %
HDLC SERPL-MCNC: 47 MG/DL (ref 40–59)
HGB BLD-MCNC: 12.8 G/DL
IMM GRANULOCYTES # BLD AUTO: 0.03 X10(3) UL (ref 0–1)
IMM GRANULOCYTES NFR BLD: 0.3 %
LDLC SERPL CALC-MCNC: 76 MG/DL (ref ?–100)
LYMPHOCYTES # BLD AUTO: 2.24 X10(3) UL (ref 1–4)
LYMPHOCYTES NFR BLD AUTO: 24.7 %
MCHC RBC AUTO-ENTMCNC: 32.3 G/DL (ref 31–37)
MCV RBC AUTO: 91.2 FL
MONOCYTES # BLD AUTO: 0.65 X10(3) UL (ref 0.1–1)
MONOCYTES NFR BLD AUTO: 7.2 %
NEUTROPHILS # BLD AUTO: 5.93 X10 (3) UL (ref 1.5–7.7)
NEUTROPHILS # BLD AUTO: 5.93 X10(3) UL (ref 1.5–7.7)
NEUTROPHILS NFR BLD AUTO: 65.3 %
NONHDLC SERPL-MCNC: 122 MG/DL (ref ?–130)
OSMOLALITY SERPL CALC.SUM OF ELEC: 291 MOSM/KG (ref 275–295)
PLATELET # BLD AUTO: 312 10(3)UL (ref 150–450)
POTASSIUM SERPL-SCNC: 4.4 MMOL/L (ref 3.5–5.1)
PROT SERPL-MCNC: 7.2 G/DL (ref 6.4–8.2)
RBC # BLD AUTO: 4.34 X10(6)UL
SODIUM SERPL-SCNC: 141 MMOL/L (ref 136–145)
TRIGL SERPL-MCNC: 282 MG/DL (ref 30–149)
TSI SER-ACNC: 1.63 MIU/ML (ref 0.36–3.74)
VLDLC SERPL CALC-MCNC: 44 MG/DL (ref 0–30)
WBC # BLD AUTO: 9.1 X10(3) UL (ref 4–11)

## 2022-02-24 PROCEDURE — 99396 PREV VISIT EST AGE 40-64: CPT | Performed by: FAMILY MEDICINE

## 2022-02-24 PROCEDURE — 85025 COMPLETE CBC W/AUTO DIFF WBC: CPT

## 2022-02-24 PROCEDURE — 80061 LIPID PANEL: CPT

## 2022-02-24 PROCEDURE — 84443 ASSAY THYROID STIM HORMONE: CPT

## 2022-02-24 PROCEDURE — 83036 HEMOGLOBIN GLYCOSYLATED A1C: CPT

## 2022-02-24 PROCEDURE — 3075F SYST BP GE 130 - 139MM HG: CPT | Performed by: FAMILY MEDICINE

## 2022-02-24 PROCEDURE — 3080F DIAST BP >= 90 MM HG: CPT | Performed by: FAMILY MEDICINE

## 2022-02-24 PROCEDURE — 36415 COLL VENOUS BLD VENIPUNCTURE: CPT

## 2022-02-24 PROCEDURE — 80053 COMPREHEN METABOLIC PANEL: CPT

## 2022-02-24 PROCEDURE — 3008F BODY MASS INDEX DOCD: CPT | Performed by: FAMILY MEDICINE

## 2022-02-24 PROCEDURE — 90471 IMMUNIZATION ADMIN: CPT | Performed by: FAMILY MEDICINE

## 2022-02-24 PROCEDURE — 90732 PPSV23 VACC 2 YRS+ SUBQ/IM: CPT | Performed by: FAMILY MEDICINE

## 2022-02-24 RX ORDER — SERTRALINE HYDROCHLORIDE 25 MG/1
TABLET, FILM COATED ORAL
COMMUNITY
Start: 2022-02-13

## 2022-03-02 ENCOUNTER — NURSE ONLY (OUTPATIENT)
Dept: GASTROENTEROLOGY | Facility: CLINIC | Age: 52
End: 2022-03-02

## 2022-03-02 DIAGNOSIS — Z12.11 COLON CANCER SCREENING: Primary | ICD-10-CM

## 2022-03-03 NOTE — PROGRESS NOTES
Dr. Joselito Malcolm,     Please send prep rx to preferred pharmacy on file. In regards to Adderall: we were instructed to as the reasoning behind consummation. If taken for ADD/ADHD then patient does not need to hold the medication. .. if taken for weight loss then it must be held for seven days prior to procedure. Thank you!

## 2022-03-03 NOTE — PROGRESS NOTES
Scheduled for:  Colonoscopy [address]8465    Provider Name:  Dr. Rolf Garcia  Date:  5/20/2022  Location:  [facility]  Sedation:  MAC  Time:  9:45 am (pt is aware to arrive at 8:45 am)  Prep:  Split dose golytely  Meds/Allergies Reconciled?:  Physician reviewed  Diagnosis with codes:  Colorectal cancer screening Z12.11  Was patient informed to call insurance with codes (Y/N): I confirmed RESAAS with this patient. Referral sent?:  Referral was sent at the time of electronic surgical scheduling. [address] [place] [address] or [address] [place] [address] notified?:  I sent an electronic request to Endo Scheduling and received a confirmation today. Medication Orders:  n/a  Misc Orders:  Patient was informed that they will need a COVID 19 test prior to their procedure. Patient verbally understood & will await a phone call from [facility] to schedule. Further instructions given by staff:  I discussed the prep instructions with the patient which she verbally understood and is aware that I will send the instructions via [employer].

## 2022-03-03 NOTE — PROGRESS NOTES
Ok sounds good. Prep sent    Thanks!     Ileana Shetty MD  [address] [place] - Gastroenterology  3/3/2022  3:02 PM

## 2022-03-08 LAB — HPV I/H RISK 1 DNA SPEC QL NAA+PROBE: NEGATIVE

## 2022-03-09 LAB
LAST PAP RESULT: NORMAL
PAP HISTORY (OTHER THAN LAST PAP): NORMAL

## 2022-04-12 ENCOUNTER — HOSPITAL ENCOUNTER (OUTPATIENT)
Dept: ULTRASOUND IMAGING | Facility: HOSPITAL | Age: 52
Discharge: HOME | End: 2022-04-12
Attending: FAMILY MEDICINE
Payer: COMMERCIAL

## 2022-04-12 DIAGNOSIS — N92.0 MENORRHAGIA WITH REGULAR CYCLE: ICD-10-CM

## 2022-04-12 PROCEDURE — 76856 US EXAM PELVIC COMPLETE: CPT | Performed by: FAMILY MEDICINE

## 2022-04-12 PROCEDURE — 76830 TRANSVAGINAL US NON-OB: CPT | Performed by: FAMILY MEDICINE

## 2022-05-20 ENCOUNTER — HOSPITAL ENCOUNTER (OUTPATIENT)
Facility: HOSPITAL | Age: 52
Setting detail: HOSPITAL OUTPATIENT SURGERY
Discharge: HOME | End: 2022-05-20
Attending: INTERNAL MEDICINE | Admitting: INTERNAL MEDICINE
Payer: COMMERCIAL

## 2022-05-20 ENCOUNTER — ANESTHESIA EVENT (OUTPATIENT)
Dept: ENDOSCOPY | Facility: HOSPITAL | Age: 52
End: 2022-05-20
Payer: COMMERCIAL

## 2022-05-20 ENCOUNTER — ANESTHESIA (OUTPATIENT)
Dept: ENDOSCOPY | Facility: HOSPITAL | Age: 52
End: 2022-05-20
Payer: COMMERCIAL

## 2022-05-20 VITALS
SYSTOLIC BLOOD PRESSURE: 143 MMHG | BODY MASS INDEX: 39.24 KG/M2 | WEIGHT: 250 LBS | OXYGEN SATURATION: 100 % | TEMPERATURE: 99 F | HEART RATE: 79 BPM | DIASTOLIC BLOOD PRESSURE: 99 MMHG | RESPIRATION RATE: 16 BRPM | HEIGHT: 67 IN

## 2022-05-20 DIAGNOSIS — Z12.11 COLON CANCER SCREENING: ICD-10-CM

## 2022-05-20 LAB — B-HCG UR QL: NEGATIVE

## 2022-05-20 PROCEDURE — 0DBK8ZX EXCISION OF ASCENDING COLON, VIA NATURAL OR ARTIFICIAL OPENING ENDOSCOPIC, DIAGNOSTIC: ICD-10-PCS | Performed by: INTERNAL MEDICINE

## 2022-05-20 PROCEDURE — 0DBL8ZX EXCISION OF TRANSVERSE COLON, VIA NATURAL OR ARTIFICIAL OPENING ENDOSCOPIC, DIAGNOSTIC: ICD-10-PCS | Performed by: INTERNAL MEDICINE

## 2022-05-20 PROCEDURE — 45380 COLONOSCOPY AND BIOPSY: CPT | Performed by: INTERNAL MEDICINE

## 2022-05-20 RX ORDER — LIDOCAINE HYDROCHLORIDE 10 MG/ML
INJECTION, SOLUTION EPIDURAL; INFILTRATION; INTRACAUDAL; PERINEURAL AS NEEDED
Status: DISCONTINUED | OUTPATIENT
Start: 2022-05-20 | End: 2022-05-20 | Stop reason: SURG

## 2022-05-20 RX ORDER — SODIUM CHLORIDE, SODIUM LACTATE, POTASSIUM CHLORIDE, CALCIUM CHLORIDE 600; 310; 30; 20 MG/100ML; MG/100ML; MG/100ML; MG/100ML
INJECTION, SOLUTION INTRAVENOUS CONTINUOUS
Status: DISCONTINUED | OUTPATIENT
Start: 2022-05-20 | End: 2022-05-20

## 2022-05-20 RX ORDER — NALOXONE HYDROCHLORIDE 0.4 MG/ML
80 INJECTION, SOLUTION INTRAMUSCULAR; INTRAVENOUS; SUBCUTANEOUS AS NEEDED
Status: DISCONTINUED | OUTPATIENT
Start: 2022-05-20 | End: 2022-05-20

## 2022-05-20 RX ADMIN — LIDOCAINE HYDROCHLORIDE 50 MG: 10 INJECTION, SOLUTION EPIDURAL; INFILTRATION; INTRACAUDAL; PERINEURAL at 09:38:00

## 2022-05-20 RX ADMIN — SODIUM CHLORIDE, SODIUM LACTATE, POTASSIUM CHLORIDE, CALCIUM CHLORIDE: 600; 310; 30; 20 INJECTION, SOLUTION INTRAVENOUS at 09:35:00

## 2022-05-20 RX ADMIN — SODIUM CHLORIDE, SODIUM LACTATE, POTASSIUM CHLORIDE, CALCIUM CHLORIDE: 600; 310; 30; 20 INJECTION, SOLUTION INTRAVENOUS at 09:58:00

## 2022-05-20 NOTE — ANESTHESIA POSTPROCEDURE EVALUATION
Patient: Giuliana Ruiz    Procedure Summary     Date: 05/20/22 Room / Location: [facility] ENDOSCOPY 01 / [facility] ENDOSCOPY    Anesthesia Start: 4256 Anesthesia Stop: 4853    Procedure: COLONOSCOPY (N/A ) Diagnosis:       Colon cancer screening      (Colon Polyps)    Surgeons: Lalito Anderson MD Anesthesiologist: Regina Foote MD    Anesthesia Type: MAC ASA Status: 3          Anesthesia Type: MAC    Vitals Value Taken Time   /80 05/20/22 1005   Temp  05/20/22 1005   Pulse 86 05/20/22 1005   Resp 16 05/20/22 1005   SpO2 100% 05/20/22 1005       [facility] AN Post Evaluation:   Patient Evaluated in PACU  Patient Participation: complete - patient participated  Level of Consciousness: awake  Pain Score: 0  Pain Management: adequate  Airway Patency:patent  Dental exam unchanged from preop  Yes    Cardiovascular Status: acceptable  Respiratory Status: acceptable  Postoperative Hydration acceptable      Sp Mancia MD  5/20/2022 10:05 AM

## 2022-05-23 ENCOUNTER — TELEPHONE (OUTPATIENT)
Dept: GASTROENTEROLOGY | Facility: CLINIC | Age: 52
End: 2022-05-23

## 2022-05-23 NOTE — TELEPHONE ENCOUNTER
Paolo Kirkpatrick MD  [facility] Clinical Staff  GI staff: please place recall for colonoscopy in 7 years       Letter sent on 5/23/2022  [facility] Maintenance Updated   Patient Outreached Updated

## 2022-05-25 ENCOUNTER — NURSE TRIAGE (OUTPATIENT)
Dept: FAMILY MEDICINE CLINIC | Facility: CLINIC | Age: 52
End: 2022-05-25

## 2022-05-25 RX ORDER — CYCLOBENZAPRINE HCL 5 MG
5 TABLET ORAL 3 TIMES DAILY PRN
Qty: 30 TABLET | Refills: 0 | Status: SHIPPED | OUTPATIENT
Start: 2022-05-25

## 2022-05-25 NOTE — TELEPHONE ENCOUNTER
Muscle relaxer prescription sent in. Not recommending anti-inflammatories when using sertraline. May take Tylenol as needed for discomfort. May use muscle rub topically as well.

## 2022-05-25 NOTE — TELEPHONE ENCOUNTER
Spoke with patient ( verified) and relayed Ray's message below--patient verbalizes understanding and agreement. No further questions/concerns at this time.

## 2022-07-02 ENCOUNTER — HOSPITAL ENCOUNTER (OUTPATIENT)
Dept: MAMMOGRAPHY | Facility: HOSPITAL | Age: 52
Discharge: HOME | End: 2022-07-02
Attending: INTERNAL MEDICINE
Payer: COMMERCIAL

## 2022-07-02 DIAGNOSIS — Z12.31 SCREENING MAMMOGRAM, ENCOUNTER FOR: ICD-10-CM

## 2022-07-02 PROCEDURE — 77067 SCR MAMMO BI INCL CAD: CPT | Performed by: INTERNAL MEDICINE

## 2022-07-02 PROCEDURE — 77063 BREAST TOMOSYNTHESIS BI: CPT | Performed by: INTERNAL MEDICINE

## 2022-08-10 ENCOUNTER — OFFICE VISIT (OUTPATIENT)
Dept: HEMATOLOGY/ONCOLOGY | Facility: HOSPITAL | Age: 52
End: 2022-08-10
Attending: INTERNAL MEDICINE
Payer: COMMERCIAL

## 2022-08-10 VITALS
TEMPERATURE: 98 F | HEART RATE: 111 BPM | DIASTOLIC BLOOD PRESSURE: 88 MMHG | OXYGEN SATURATION: 93 % | HEIGHT: 66.5 IN | RESPIRATION RATE: 18 BRPM | SYSTOLIC BLOOD PRESSURE: 142 MMHG | BODY MASS INDEX: 39.39 KG/M2 | WEIGHT: 248 LBS

## 2022-08-10 DIAGNOSIS — C50.412 MALIGNANT NEOPLASM OF UPPER-OUTER QUADRANT OF LEFT BREAST IN FEMALE, ESTROGEN RECEPTOR POSITIVE (HCC): ICD-10-CM

## 2022-08-10 DIAGNOSIS — Z51.81 ENCOUNTER FOR MONITORING TAMOXIFEN THERAPY: ICD-10-CM

## 2022-08-10 DIAGNOSIS — Z17.0 MALIGNANT NEOPLASM OF UPPER-OUTER QUADRANT OF LEFT BREAST IN FEMALE, ESTROGEN RECEPTOR POSITIVE (HCC): ICD-10-CM

## 2022-08-10 DIAGNOSIS — Z79.810 ENCOUNTER FOR MONITORING TAMOXIFEN THERAPY: ICD-10-CM

## 2022-08-10 PROCEDURE — 99214 OFFICE O/P EST MOD 30 MIN: CPT | Performed by: INTERNAL MEDICINE

## 2023-02-13 ENCOUNTER — TELEPHONE (OUTPATIENT)
Dept: HEMATOLOGY/ONCOLOGY | Facility: HOSPITAL | Age: 53
End: 2023-02-13

## 2023-02-13 NOTE — TELEPHONE ENCOUNTER
Patient isn't sure if she should have scheduled a mammogram before her follow up appt that is for tomorrow at 4:30PM. Can you confirm with patient if she should keep appt or reschedule.

## 2023-02-13 NOTE — TELEPHONE ENCOUNTER
Returned phone call and notified to keep appointment tomorrow and Dr. Arch Sandifer will order Mammogram due in July.

## 2023-02-14 ENCOUNTER — OFFICE VISIT (OUTPATIENT)
Dept: HEMATOLOGY/ONCOLOGY | Facility: HOSPITAL | Age: 53
End: 2023-02-14
Attending: INTERNAL MEDICINE
Payer: COMMERCIAL

## 2023-02-14 VITALS
SYSTOLIC BLOOD PRESSURE: 147 MMHG | BODY MASS INDEX: 40.85 KG/M2 | OXYGEN SATURATION: 97 % | DIASTOLIC BLOOD PRESSURE: 80 MMHG | TEMPERATURE: 98 F | HEART RATE: 89 BPM | WEIGHT: 257.19 LBS | HEIGHT: 66.5 IN | RESPIRATION RATE: 20 BRPM

## 2023-02-14 DIAGNOSIS — Z17.0 MALIGNANT NEOPLASM OF UPPER-OUTER QUADRANT OF LEFT BREAST IN FEMALE, ESTROGEN RECEPTOR POSITIVE (HCC): Primary | ICD-10-CM

## 2023-02-14 DIAGNOSIS — Z12.31 SCREENING MAMMOGRAM FOR BREAST CANCER: ICD-10-CM

## 2023-02-14 DIAGNOSIS — Z12.39 BREAST CANCER SCREENING, HIGH RISK PATIENT: ICD-10-CM

## 2023-02-14 DIAGNOSIS — Z79.810 ENCOUNTER FOR MONITORING TAMOXIFEN THERAPY: ICD-10-CM

## 2023-02-14 DIAGNOSIS — Z51.81 ENCOUNTER FOR MONITORING TAMOXIFEN THERAPY: ICD-10-CM

## 2023-02-14 DIAGNOSIS — C50.412 MALIGNANT NEOPLASM OF UPPER-OUTER QUADRANT OF LEFT BREAST IN FEMALE, ESTROGEN RECEPTOR POSITIVE (HCC): Primary | ICD-10-CM

## 2023-02-14 PROCEDURE — 99214 OFFICE O/P EST MOD 30 MIN: CPT | Performed by: INTERNAL MEDICINE

## 2023-02-14 RX ORDER — TAMOXIFEN CITRATE 20 MG/1
20 TABLET ORAL DAILY
Qty: 90 TABLET | Refills: 3 | Status: SHIPPED | OUTPATIENT
Start: 2023-02-14

## 2023-02-27 ENCOUNTER — HOSPITAL ENCOUNTER (OUTPATIENT)
Dept: MRI IMAGING | Facility: HOSPITAL | Age: 53
Discharge: HOME | End: 2023-02-27
Attending: INTERNAL MEDICINE
Payer: COMMERCIAL

## 2023-02-27 DIAGNOSIS — Z12.39 BREAST CANCER SCREENING, HIGH RISK PATIENT: ICD-10-CM

## 2023-02-27 DIAGNOSIS — C50.412 MALIGNANT NEOPLASM OF UPPER-OUTER QUADRANT OF LEFT BREAST IN FEMALE, ESTROGEN RECEPTOR POSITIVE (HCC): ICD-10-CM

## 2023-02-27 DIAGNOSIS — Z17.0 MALIGNANT NEOPLASM OF UPPER-OUTER QUADRANT OF LEFT BREAST IN FEMALE, ESTROGEN RECEPTOR POSITIVE (HCC): ICD-10-CM

## 2023-02-27 PROCEDURE — A9575 INJ GADOTERATE MEGLUMI 0.1ML: HCPCS | Performed by: INTERNAL MEDICINE

## 2023-02-27 PROCEDURE — 77049 MRI BREAST C-+ W/CAD BI: CPT | Performed by: INTERNAL MEDICINE

## 2023-02-27 RX ORDER — GADOTERATE MEGLUMINE 376.9 MG/ML
20 INJECTION INTRAVENOUS
Status: COMPLETED | OUTPATIENT
Start: 2023-02-27 | End: 2023-02-27

## 2023-02-27 RX ADMIN — GADOTERATE MEGLUMINE 20 ML: 376.9 INJECTION INTRAVENOUS at 19:25:00

## 2023-08-10 ENCOUNTER — OFFICE VISIT (OUTPATIENT)
Dept: FAMILY MEDICINE CLINIC | Facility: CLINIC | Age: 53
End: 2023-08-10

## 2023-08-10 VITALS
TEMPERATURE: 98 F | WEIGHT: 258 LBS | HEIGHT: 66.5 IN | RESPIRATION RATE: 20 BRPM | BODY MASS INDEX: 40.98 KG/M2 | DIASTOLIC BLOOD PRESSURE: 77 MMHG | SYSTOLIC BLOOD PRESSURE: 131 MMHG | HEART RATE: 89 BPM

## 2023-08-10 DIAGNOSIS — S90.221A CONTUSION OF TOENAIL OF RIGHT FOOT, INITIAL ENCOUNTER: Primary | ICD-10-CM

## 2023-08-10 PROCEDURE — 3078F DIAST BP <80 MM HG: CPT | Performed by: FAMILY MEDICINE

## 2023-08-10 PROCEDURE — 3075F SYST BP GE 130 - 139MM HG: CPT | Performed by: FAMILY MEDICINE

## 2023-08-10 PROCEDURE — 99212 OFFICE O/P EST SF 10 MIN: CPT | Performed by: FAMILY MEDICINE

## 2023-08-10 PROCEDURE — 3008F BODY MASS INDEX DOCD: CPT | Performed by: FAMILY MEDICINE

## 2023-08-10 NOTE — PROGRESS NOTES
Subjective:   Patient ID: Laurie Calles is a 47year old female. Pt presents with discoloration of right large toenail. Pt states she had nail covered with nail polish. No sig trauma or known injury. No bleeding or sig pains. Pt was concerned about fungus. History/Other:   Review of Systems   Constitutional:  Negative for fever. Current Outpatient Medications   Medication Sig Dispense Refill    tamoxifen 20 MG Oral Tab Take 1 tablet (20 mg total) by mouth daily. 90 tablet 3    cyclobenzaprine 5 MG Oral Tab Take 1 tablet (5 mg total) by mouth 3 (three) times daily as needed for Muscle spasms. 30 tablet 0    sertraline 25 MG Oral Tab every 28 days. FOR 21 DAYS IN, THEN REMOVE FOR 7 DAYS      amphetamine-dextroamphetamine 5 MG Oral Tab TAKE 1 TABLET (5 MG) BY ORAL ROUTE ONCE DAILY AT 4 PM      ADDERALL XR 30 MG Oral Capsule SR 24 Hr TAKE 1 CAPSULE BY MOUTH ONCE DAILY IN THE MORNING UPON AWAKENING      VRAYLAR 1.5 MG Oral Cap       traZODone HCl 100 MG Oral Tab Take 2 tablets (200 mg total) by mouth. At Bedtime  1    ALPRAZolam 0.25 MG Oral Tab Take 1 tablet (0.25 mg total) by mouth nightly as needed for Anxiety. Not to take with any muscle relaxer 30 tablet 0    Sertraline HCl 100 MG Oral Tab Take 1 tablet (100 mg total) by mouth daily. 90 tablet 0    Multiple Vitamin (MULTIVITAMINS) Oral Cap Take 1 capsule by mouth daily. Allergies:  Bactrim [Sulfametho*    HIVES  Latex                   HIVES, RASH    Objective:   Physical Exam  Constitutional:       Appearance: Normal appearance. Musculoskeletal:      Comments: Right foot: darkened area of right large toenail that appears to be bruise. No signs of any redness of toe or other symptoms. Non-tender. Neurological:      Mental Status: She is alert.          Assessment & Plan:   Contusion of toenail of right foot, initial encounter:  - After discussion with patient, to monitor for symptoms and call if any significant symptoms or persistent; discussed follow up with podiatry if sig symptoms or not resolved. No orders of the defined types were placed in this encounter.       Meds This Visit:  Requested Prescriptions      No prescriptions requested or ordered in this encounter       Imaging & Referrals:  None

## 2023-08-15 ENCOUNTER — OFFICE VISIT (OUTPATIENT)
Dept: HEMATOLOGY/ONCOLOGY | Facility: HOSPITAL | Age: 53
End: 2023-08-15
Attending: INTERNAL MEDICINE
Payer: COMMERCIAL

## 2023-08-15 VITALS
RESPIRATION RATE: 20 BRPM | DIASTOLIC BLOOD PRESSURE: 86 MMHG | TEMPERATURE: 98 F | BODY MASS INDEX: 41.26 KG/M2 | OXYGEN SATURATION: 97 % | WEIGHT: 259.81 LBS | HEART RATE: 82 BPM | SYSTOLIC BLOOD PRESSURE: 141 MMHG | HEIGHT: 66.5 IN

## 2023-08-15 DIAGNOSIS — Z79.810 ENCOUNTER FOR MONITORING TAMOXIFEN THERAPY: ICD-10-CM

## 2023-08-15 DIAGNOSIS — Z17.0 MALIGNANT NEOPLASM OF UPPER-OUTER QUADRANT OF LEFT BREAST IN FEMALE, ESTROGEN RECEPTOR POSITIVE: Primary | ICD-10-CM

## 2023-08-15 DIAGNOSIS — C50.412 MALIGNANT NEOPLASM OF UPPER-OUTER QUADRANT OF LEFT BREAST IN FEMALE, ESTROGEN RECEPTOR POSITIVE: Primary | ICD-10-CM

## 2023-08-15 DIAGNOSIS — Z51.81 ENCOUNTER FOR MONITORING TAMOXIFEN THERAPY: ICD-10-CM

## 2023-08-15 DIAGNOSIS — Z12.39 BREAST CANCER SCREENING, HIGH RISK PATIENT: ICD-10-CM

## 2023-08-15 DIAGNOSIS — Z12.39 BREAST CANCER SCREENING OTHER THAN MAMMOGRAM: ICD-10-CM

## 2023-08-15 PROBLEM — Z12.31 SCREENING MAMMOGRAM FOR BREAST CANCER: Status: ACTIVE | Noted: 2023-08-15

## 2023-08-15 PROCEDURE — 99214 OFFICE O/P EST MOD 30 MIN: CPT | Performed by: INTERNAL MEDICINE

## 2023-09-16 ENCOUNTER — HOSPITAL ENCOUNTER (OUTPATIENT)
Dept: MAMMOGRAPHY | Facility: HOSPITAL | Age: 53
Discharge: HOME | End: 2023-09-16
Attending: INTERNAL MEDICINE
Payer: COMMERCIAL

## 2023-09-16 DIAGNOSIS — Z12.31 SCREENING MAMMOGRAM FOR BREAST CANCER: ICD-10-CM

## 2023-09-16 PROCEDURE — 77067 SCR MAMMO BI INCL CAD: CPT | Performed by: INTERNAL MEDICINE

## 2023-09-16 PROCEDURE — 77063 BREAST TOMOSYNTHESIS BI: CPT | Performed by: INTERNAL MEDICINE

## 2024-02-19 RX ORDER — TAMOXIFEN CITRATE 20 MG/1
20 TABLET ORAL DAILY
Qty: 90 TABLET | Refills: 3 | Status: SHIPPED | OUTPATIENT
Start: 2024-02-19 | End: 2024-02-21

## 2024-02-21 ENCOUNTER — OFFICE VISIT (OUTPATIENT)
Dept: HEMATOLOGY/ONCOLOGY | Facility: HOSPITAL | Age: 54
End: 2024-02-21
Attending: INTERNAL MEDICINE
Payer: COMMERCIAL

## 2024-02-21 VITALS
BODY MASS INDEX: 42.19 KG/M2 | DIASTOLIC BLOOD PRESSURE: 88 MMHG | RESPIRATION RATE: 18 BRPM | TEMPERATURE: 99 F | HEIGHT: 66.5 IN | WEIGHT: 265.69 LBS | SYSTOLIC BLOOD PRESSURE: 145 MMHG | OXYGEN SATURATION: 96 % | HEART RATE: 97 BPM

## 2024-02-21 DIAGNOSIS — Z85.3 ENCOUNTER FOR FOLLOW-UP SURVEILLANCE OF BREAST CANCER: ICD-10-CM

## 2024-02-21 DIAGNOSIS — Z51.81 ENCOUNTER FOR MONITORING TAMOXIFEN THERAPY: ICD-10-CM

## 2024-02-21 DIAGNOSIS — Z08 ENCOUNTER FOR FOLLOW-UP SURVEILLANCE OF BREAST CANCER: ICD-10-CM

## 2024-02-21 DIAGNOSIS — Z12.31 SCREENING MAMMOGRAM FOR BREAST CANCER: ICD-10-CM

## 2024-02-21 DIAGNOSIS — Z12.39 BREAST CANCER SCREENING, HIGH RISK PATIENT: ICD-10-CM

## 2024-02-21 DIAGNOSIS — Z17.0 MALIGNANT NEOPLASM OF UPPER-OUTER QUADRANT OF LEFT BREAST IN FEMALE, ESTROGEN RECEPTOR POSITIVE (HCC): Primary | ICD-10-CM

## 2024-02-21 DIAGNOSIS — Z79.810 ENCOUNTER FOR MONITORING TAMOXIFEN THERAPY: ICD-10-CM

## 2024-02-21 DIAGNOSIS — C50.412 MALIGNANT NEOPLASM OF UPPER-OUTER QUADRANT OF LEFT BREAST IN FEMALE, ESTROGEN RECEPTOR POSITIVE (HCC): Primary | ICD-10-CM

## 2024-02-21 PROCEDURE — 99214 OFFICE O/P EST MOD 30 MIN: CPT | Performed by: INTERNAL MEDICINE

## 2024-02-21 RX ORDER — DEXMETHYLPHENIDATE HYDROCHLORIDE 25 MG/1
25 CAPSULE, EXTENDED RELEASE ORAL EVERY MORNING
COMMUNITY
Start: 2024-01-12

## 2024-02-21 NOTE — PROGRESS NOTES
HPI     Chelsey Barroso is a 53 year old female here for f/u of   Encounter Diagnoses   Name Primary?    Malignant neoplasm of upper-outer quadrant of left breast in female, estrogen receptor positive (HCC) [C50.412, Z17.0] Yes    Encounter for monitoring tamoxifen therapy     Breast cancer screening, high risk patient     Encounter for follow-up surveillance of breast cancer        Taking tamoxifen daily.  Tolerating well, mild hot flashes and cold flashes.    Periods started to skip, states now lighter.  LMP around December of 2023.    States hard time loosing weight.      No other changes on SBE.    No problems with the L arm with edema.    States weight is stable.       ECOG PS 0    Review of Systems:   Review of Systems   Constitutional:  Positive for fatigue. Negative for appetite change and unexpected weight change.   Respiratory:  Negative for cough and shortness of breath.    Cardiovascular:  Negative for chest pain.   Gastrointestinal:  Negative for abdominal pain.   Endocrine: Positive for hot flashes.   Musculoskeletal:  Positive for arthralgias (knees, feet, ankles) and back pain (chronic lumbar spine pain, improved. ).        No bone pain   Neurological:  Negative for dizziness and headaches.   Hematological:  Negative for adenopathy.   Psychiatric/Behavioral:  Negative for sleep disturbance.            Current Outpatient Medications   Medication Sig Dispense Refill    Dexmethylphenidate HCl ER 25 MG Oral Capsule SR 24 Hr Take 1 capsule (25 mg total) by mouth every morning.      tamoxifen 20 MG Oral Tab Take 1 tablet (20 mg total) by mouth daily. 90 tablet 3    sertraline 25 MG Oral Tab every 28 days. FOR 21 DAYS IN, THEN REMOVE FOR 7 DAYS      VRAYLAR 1.5 MG Oral Cap       traZODone HCl 100 MG Oral Tab Take 2 tablets (200 mg total) by mouth. At Bedtime  1    ALPRAZolam 0.25 MG Oral Tab Take 1 tablet (0.25 mg total) by mouth nightly as needed for Anxiety. Not to take with any muscle relaxer 30  tablet 0    Multiple Vitamin (MULTIVITAMINS) Oral Cap Take 1 capsule by mouth daily.      cyclobenzaprine 5 MG Oral Tab Take 1 tablet (5 mg total) by mouth 3 (three) times daily as needed for Muscle spasms. (Patient not taking: Reported on 2024) 30 tablet 0    amphetamine-dextroamphetamine 5 MG Oral Tab TAKE 1 TABLET (5 MG) BY ORAL ROUTE ONCE DAILY AT 4 PM (Patient not taking: Reported on 2024)      ADDERALL XR 30 MG Oral Capsule SR 24 Hr TAKE 1 CAPSULE BY MOUTH ONCE DAILY IN THE MORNING UPON AWAKENING (Patient not taking: Reported on 2024)       Allergies:   Allergies   Allergen Reactions    Bactrim [Sulfamethoxazole W/Trimethoprim] HIVES    Latex HIVES and RASH       Past Medical History:   Diagnosis Date    Anxiety state, unspecified     Attention deficit disorder     Breast CA (HCC)     Breast mass, left     has had mammo, ultrasound, biopsy    Cancer (HCC) 10/29/2018    left breast    Colon adenoma 05/20/2022    x1    Depression     High cholesterol     Hypercholesterolemia with hypertriglyceridemia     Morbid obesity with BMI of 40.0-44.9, adult (HCC)     Vitamin D deficiency      Past Surgical History:   Procedure Laterality Date    BENIGN BIOPSY LEFT      BENIGN BIOPSY RIGHT            COLONOSCOPY  2022    COLONOSCOPY N/A 2022    Procedure: COLONOSCOPY;  Surgeon: Kt Bailey MD;  Location: [facility] ENDOSCOPY    LUMPECTOMY LEFT Left 10/29/2018    MARISOL BIOPSY STEREO NODULE 2 SITE BILAT (CPT=19081/87947)      negative    NEEDLE BIOPSY LEFT  10/08/2018    NEEDLE BIOPSY RIGHT  10/08/2018    RADIATION LEFT  2019    SINUS SURGERY        TONSILLECTOMY       Social History     Socioeconomic History    Marital status:    Tobacco Use    Smoking status: Former     Packs/day: 0.50     Years: 10.00     Additional pack years: 0.00     Total pack years: 5.00     Types: Cigarettes     Quit date: 10/24/2001     Years since quittin.3    Smokeless tobacco: Never   Vaping  Use    Vaping Use: Never used   Substance and Sexual Activity    Alcohol use: Yes     Comment: socially    Drug use: No     Comment: never   Other Topics Concern    Caffeine Concern Yes     Comment: 1-2 cups of coffee       Family History   Problem Relation Age of Onset    Breast Cancer Self     Other (Other) Mother         cushings syndrome    Heart Disorder Father     Diabetes Father     Hypertension Father     Breast Cancer Sister 47    Breast Cancer Sister 51    Cancer Maternal Grandfather 70        prostate/bladder ca    Breast Cancer Paternal Grandmother 43        d. 70    Cancer Maternal Uncle 80        unknown primary         PHYSICAL EXAM:    /88 (BP Location: Left arm, Patient Position: Sitting, Cuff Size: large)   Pulse 97   Temp 98.9 °F (37.2 °C) (Oral)   Resp 18   Ht 1.689 m (5' 6.5\")   Wt 120.5 kg (265 lb 11.2 oz)   LMP 04/25/2023   SpO2 96%   BMI 42.25 kg/m²   Wt Readings from Last 6 Encounters:   02/21/24 120.5 kg (265 lb 11.2 oz)   08/15/23 117.8 kg (259 lb 12.8 oz)   08/10/23 117 kg (258 lb)   02/14/23 116.7 kg (257 lb 3.2 oz)   08/10/22 112.5 kg (248 lb)   05/20/22 113.4 kg (250 lb)     General: Patient is alert, not in acute distress.  HEENT: EOMs intact. PERRL.   Neck: No JVD. No palpable lymphadenopathy. Neck is supple.  Chest: Clear to auscultation.  Breasts: R w/o masses, prominent breast tissue in the R axilla.  L breast w/o masses, surgical scar and RT changes in the axilla.  Heart: Regular rate and rhythm.   Abdomen: Soft, non tender with good bowel sounds.  Extremities: No edema.  Neurological: Grossly intact.   Lymphatics: There is no palpable lymphadenopathy throughout in the cervical, supraclavicular, axillary, or inguinal regions.  Psych/Depression: nl        ASSESSMENT/PLAN:     Encounter Diagnoses   Name Primary?    Malignant neoplasm of upper-outer quadrant of left breast in female, estrogen receptor positive (HCC) [C50.412, Z17.0] Yes    Encounter for monitoring  tamoxifen therapy     Breast cancer screening, high risk patient     Encounter for follow-up surveillance of breast cancer        Cancer Staging  Malignant neoplasm of upper-outer quadrant of left breast in female, estrogen receptor positive (HCC)  Staging form: Breast, AJCC 8th Edition  - Pathologic stage from 11/7/2018: Stage IA (pT1a, pN0, cM0, G2, ER: Positive, OR: Positive, HER2: Negative) - Signed by Deacon Mclean MD on 2/6/2019    Patient completed local treatment with lumpectomy October 29, 2018.  Paoli lymph node biopsy was completed on November 12, 2018.  This was negative.     The patient completed radiation therapy to the left breast from 12/26/18 to 1/24/19.     On adjuvant hormonal therapy with tamoxifen and is premenopausal.  Patient to complete tamoxifen in February of 2024.   Completes at the end of the month, refills discontinued.    B mammogram in 9/16/2023 BI-RADS 2.  Due again in September 2024.    Discussed recommendation for MRI for patients dx with breast cancer < 49 y/o.  MRI in February of 2023 BIRADS-2, due in February of 2024.  Patient to schedule at her earliest convenience.    Will now have yearly follow-up.  Continue with imaging as above.    Recommend to go to Dr. Ponce for weight loss.     No orders of the defined types were placed in this encounter.    Results From Past 48 Hours:  No results found for this or any previous visit (from the past 48 hour(s)).    MDM Moderate    Imaging & Referrals:  None   No orders of the defined types were placed in this encounter.

## 2024-02-29 ENCOUNTER — OFFICE VISIT (OUTPATIENT)
Dept: SURGERY | Facility: CLINIC | Age: 54
End: 2024-02-29
Payer: COMMERCIAL

## 2024-02-29 VITALS
SYSTOLIC BLOOD PRESSURE: 126 MMHG | HEART RATE: 92 BPM | OXYGEN SATURATION: 96 % | HEIGHT: 65.7 IN | WEIGHT: 264.81 LBS | DIASTOLIC BLOOD PRESSURE: 78 MMHG | BODY MASS INDEX: 43.07 KG/M2

## 2024-02-29 DIAGNOSIS — E78.2 HYPERCHOLESTEROLEMIA WITH HYPERTRIGLYCERIDEMIA: Primary | ICD-10-CM

## 2024-02-29 DIAGNOSIS — E66.01 MORBID OBESITY WITH BMI OF 40.0-44.9, ADULT (HCC): ICD-10-CM

## 2024-02-29 DIAGNOSIS — F98.8 ATTENTION DEFICIT DISORDER (ADD) WITHOUT HYPERACTIVITY: ICD-10-CM

## 2024-02-29 PROCEDURE — 3008F BODY MASS INDEX DOCD: CPT | Performed by: INTERNAL MEDICINE

## 2024-02-29 PROCEDURE — 3074F SYST BP LT 130 MM HG: CPT | Performed by: INTERNAL MEDICINE

## 2024-02-29 PROCEDURE — 3078F DIAST BP <80 MM HG: CPT | Performed by: INTERNAL MEDICINE

## 2024-02-29 PROCEDURE — 99204 OFFICE O/P NEW MOD 45 MIN: CPT | Performed by: INTERNAL MEDICINE

## 2024-02-29 RX ORDER — ALPRAZOLAM 0.5 MG/1
0.5 TABLET ORAL
COMMUNITY
Start: 2024-01-29

## 2024-02-29 RX ORDER — ONDANSETRON 4 MG/1
4 TABLET, FILM COATED ORAL EVERY 8 HOURS PRN
Qty: 20 TABLET | Refills: 2 | Status: SHIPPED | OUTPATIENT
Start: 2024-02-29

## 2024-02-29 RX ORDER — SERTRALINE HYDROCHLORIDE 100 MG/1
125 TABLET, FILM COATED ORAL DAILY
COMMUNITY
Start: 2023-12-21

## 2024-02-29 RX ORDER — CARIPRAZINE 3 MG/1
3 CAPSULE, GELATIN COATED ORAL
COMMUNITY
Start: 2024-02-24

## 2024-02-29 NOTE — PROGRESS NOTES
[facility], [facility], [place]  [address] [facility] [address]  [facility] [address]  Dept: [phone]       Patient:  Chelsey Barroso  :      1970  MRN:      [id number]    Chief Complaint:    Chief Complaint   Patient presents with    Consult     Last office visit on 2018    Weight Management       SUBJECTIVE     History of Present Illness:  Chelsey is being seen today for a follow-up for non surgical weight loss.     Past Medical History:   Past Medical History:   Diagnosis Date    Anxiety state, unspecified     Attention deficit disorder     Breast CA (HCC)     Breast mass, left     has had mammo, ultrasound, biopsy    Cancer (HCC) 10/29/2018    left breast    Colon adenoma 05/20/2022    x1    Depression     High cholesterol     Hypercholesterolemia with hypertriglyceridemia     Morbid obesity with BMI of 40.0-44.9, adult (HCC)     Vitamin D deficiency         Comorbidities:  Joint pain-Improvement?  yes, GERD-Improvement?  yes, Diabetes-Improvement?  yes, Hypertension-Improvement?  yes, Heart disease-Improvement?  yes, Hyperlipidemia-Improvement?  yes, SURENDRA-Improvement?  yes and Snoring-Improvement?  yes    OBJECTIVE     Vitals: /78   Pulse 92   Ht 5' 5.7\" (1.669 m)   Wt 264 lb 12.8 oz (120.1 kg)   LMP 2023   SpO2 96%   BMI 43.13 kg/m²     Initial weight loss: +05   Total weight loss: +10    Start weight: 254    Wt Readings from Last 3 Encounters:   24 264 lb 12.8 oz (120.1 kg)   24 265 lb 11.2 oz (120.5 kg)   08/15/23 259 lb 12.8 oz (117.8 kg)       Patient Medications:    Current Outpatient Medications   Medication Sig Dispense Refill    ALPRAZolam 0.5 MG Oral Tab Take 1 tablet (0.5 mg total) by mouth.      VRAYLAR 3 MG Oral Cap 3 mg.      sertraline 100 MG Oral Tab Take 125 mg by mouth daily.      Dexmethylphenidate HCl ER 25 MG Oral Capsule SR 24 Hr Take 1 capsule (25 mg total) by mouth every morning.      sertraline 25 MG Oral  Tab every 28 days. FOR 21 DAYS IN, THEN REMOVE FOR 7 DAYS      amphetamine-dextroamphetamine 5 MG Oral Tab TAKE 1 TABLET (5 MG) BY ORAL ROUTE ONCE DAILY AT 4 PM (Patient not taking: Reported on 2024)      ADDERALL XR 30 MG Oral Capsule SR 24 Hr TAKE 1 CAPSULE BY MOUTH ONCE DAILY IN THE MORNING UPON AWAKENING (Patient not taking: Reported on 2024)      traZODone HCl 100 MG Oral Tab Take 2 tablets (200 mg total) by mouth. At Bedtime  1    Multiple Vitamin (MULTIVITAMINS) Oral Cap Take 1 capsule by mouth daily.       Allergies:  Bactrim [sulfamethoxazole w/trimethoprim] and Latex     Social History:    Social History     Socioeconomic History    Marital status:      Spouse name: Not on file    Number of children: Not on file    Years of education: Not on file    Highest education level: Not on file   Occupational History    Not on file   Tobacco Use    Smoking status: Former     Packs/day: 0.50     Years: 10.00     Additional pack years: 0.00     Total pack years: 5.00     Types: Cigarettes     Quit date: 10/24/2001     Years since quittin.3    Smokeless tobacco: Never   Vaping Use    Vaping Use: Never used   Substance and Sexual Activity    Alcohol use: Yes     Comment: socially    Drug use: No     Comment: never    Sexual activity: Not on file   Other Topics Concern     Service Not Asked    Blood Transfusions Not Asked    Caffeine Concern Yes     Comment: 1-2 cups of coffee    Occupational Exposure Not Asked    Hobby Hazards Not Asked    Sleep Concern Not Asked    Stress Concern Not Asked    Weight Concern Not Asked    Special Diet Not Asked    Back Care Not Asked    Exercise Not Asked    Bike Helmet Not Asked    Seat Belt Not Asked    Self-Exams Not Asked   Social History Narrative    Not on file     Social Determinants of Health     Financial Resource Strain: Not on file   Food Insecurity: Not on file   Transportation Needs: Not on file   Physical Activity: Not on file   Stress: Not on  file   Social Connections: Not on file   Housing Stability: Not on file     Surgical History:    Past Surgical History:   Procedure Laterality Date    BENIGN BIOPSY LEFT      BENIGN BIOPSY RIGHT            COLONOSCOPY  2022    COLONOSCOPY N/A 2022    Procedure: COLONOSCOPY;  Surgeon: Kt Bailey MD;  Location: [facility] ENDOSCOPY    LUMPECTOMY LEFT Left 10/29/2018    MARISOL BIOPSY STEREO NODULE 2 SITE BILAT (CPT=19081/17750)      negative    NEEDLE BIOPSY LEFT  10/08/2018    NEEDLE BIOPSY RIGHT  10/08/2018    RADIATION LEFT  2019    SINUS SURGERY        TONSILLECTOMY       Family History:    Family History   Problem Relation Age of Onset    Breast Cancer Self     Other (Other) Mother         cushings syndrome    Heart Disorder Father     Diabetes Father     Hypertension Father     Breast Cancer Sister 47    Breast Cancer Sister 51    Cancer Maternal Grandfather 70        prostate/bladder ca    Breast Cancer Paternal Grandmother 43        d. 70    Cancer Maternal Uncle 80        unknown primary       Food Journal  Reviewed and Discussed:       Patient has a Food Journal?: yes   Patient is reading nutrition labels?  yes  Average Caloric Intake:     Average CHO Intake: 130  Is patient exercising? no  Type of exercise?     Eating Habits  Patient states the following:  Eats 3 meal(s) per day  Length of time it takes to consume a meal:  20  # of snacks per day: 1 Type of snacks:  Tortilla chips, fruit  Amount of soda consumption per day:  La croix  Amount of water (in ounces) per day:  48  Drinking between meals only:  yes  Toughest challenge:  exercise    Nutritional Goals  Limit carbohydrates to 100 gms per day, Eat 100-200 calories within 1 hour of waking  and Eat 3-4 cups of fresh fruits or vegetables daily    Behavior Modifications Reviewed and Discussed  Eat breakfast, Eat 3 meals per day, Plan meals in advance, Read nutrition labels, Drink 64 oz of water per day, Maintain a daily food  journal, No drinking 30 minutes before or after meals, Utlize portion control strategies to reduce calorie intake, Identify triggers for eating and manage cues and Eat slowly and take 20 to 30 minutes to complete each meal    Exercise Goals Reviewed and Discussed    Needs to start walking    ROS:    Constitutional: positive for fatigue  Respiratory: positive for dyspnea on exertion  Cardiovascular: negative  Gastrointestinal: positive for reflux symptoms  Musculoskeletal:positive for arthralgias and back pain  Neurological: negative  Behavioral/Psych: positive for anxiety  Endocrine: negative  All other systems were reviewed and are negative    Physical Exam:   General appearance: alert, appears stated age and cooperative  Head: Normocephalic, without obvious abnormality, atraumatic  Eyes: conjunctivae/corneas clear. PERRL, EOM's intact. Fundi benign.  Back: symmetric, no curvature. ROM normal. No CVA tenderness.  Lungs: clear to auscultation bilaterally  Heart: S1, S2 normal, no murmur, click, rub or gallop, regular rate and rhythm  Abdomen: soft, non-tender; bowel sounds normal; no masses,  no organomegaly  Extremities: extremities normal, atraumatic, no cyanosis or edema  Pulses: 2+ and symmetric  Skin: Skin color, texture, turgor normal. No rashes or lesions    ASSESSMENT       Encounter Diagnosis(ses):   Encounter Diagnoses   Name Primary?    Hypercholesterolemia with hypertriglyceridemia Yes    Morbid obesity with BMI of 40.0-44.9, adult (HCC)        PLAN     Patient is not interested in bariatric surgery. Patient desires to pursue traditional weight loss at this time.        Binge eating increased      Goals for next month:  1. Keep a food log.  2. Drink 48-64 ounces of non-caloric beverages per day. No fruit juices or regular soda.  3. Increase activity-upper body exercises, walk 10 minutes per day.  4. Increase fruit and vegetable servings to 5-6 per day.      Should come to seminar    Will start  Wegovy    Needs to ambulate              Diagnoses and all orders for this visit:    Hypercholesterolemia with hypertriglyceridemia    Morbid obesity with BMI of 40.0-44.9, adult (HCC)          James Ponce MD

## 2024-03-04 ENCOUNTER — TELEPHONE (OUTPATIENT)
Dept: SURGERY | Facility: CLINIC | Age: 54
End: 2024-03-04

## 2024-03-04 NOTE — TELEPHONE ENCOUNTER
Prior authorization initiated for Wegovy initiated [facility] in [employer] system.

## 2024-03-05 ENCOUNTER — TELEPHONE (OUTPATIENT)
Dept: SURGERY | Facility: CLINIC | Age: 54
End: 2024-03-05

## 2024-03-07 ENCOUNTER — HOSPITAL ENCOUNTER (OUTPATIENT)
Dept: MRI IMAGING | Facility: HOSPITAL | Age: 54
Discharge: HOME | End: 2024-03-07
Attending: INTERNAL MEDICINE
Payer: COMMERCIAL

## 2024-03-07 DIAGNOSIS — Z12.39 BREAST CANCER SCREENING OTHER THAN MAMMOGRAM: ICD-10-CM

## 2024-03-07 DIAGNOSIS — Z12.39 BREAST CANCER SCREENING, HIGH RISK PATIENT: ICD-10-CM

## 2024-03-07 PROCEDURE — A9575 INJ GADOTERATE MEGLUMI 0.1ML: HCPCS | Performed by: INTERNAL MEDICINE

## 2024-03-07 PROCEDURE — 77049 MRI BREAST C-+ W/CAD BI: CPT | Performed by: INTERNAL MEDICINE

## 2024-03-07 RX ORDER — GADOTERATE MEGLUMINE 376.9 MG/ML
20 INJECTION INTRAVENOUS
Status: COMPLETED | OUTPATIENT
Start: 2024-03-07 | End: 2024-03-07

## 2024-03-07 RX ADMIN — GADOTERATE MEGLUMINE 20 ML: 376.9 INJECTION INTRAVENOUS at 11:21:00

## 2024-04-20 ENCOUNTER — OFFICE VISIT (OUTPATIENT)
Dept: FAMILY MEDICINE CLINIC | Facility: CLINIC | Age: 54
End: 2024-04-20

## 2024-04-20 ENCOUNTER — LAB ENCOUNTER (OUTPATIENT)
Dept: LAB | Age: 54
End: 2024-04-20
Attending: FAMILY MEDICINE
Payer: COMMERCIAL

## 2024-04-20 VITALS
SYSTOLIC BLOOD PRESSURE: 131 MMHG | HEART RATE: 76 BPM | DIASTOLIC BLOOD PRESSURE: 86 MMHG | WEIGHT: 258 LBS | TEMPERATURE: 98 F | HEIGHT: 66.5 IN | BODY MASS INDEX: 40.98 KG/M2

## 2024-04-20 DIAGNOSIS — N92.6 IRREGULAR MENSES: ICD-10-CM

## 2024-04-20 DIAGNOSIS — E66.01 MORBID OBESITY WITH BMI OF 40.0-44.9, ADULT (HCC): ICD-10-CM

## 2024-04-20 DIAGNOSIS — B37.2 SKIN CANDIDIASIS: ICD-10-CM

## 2024-04-20 DIAGNOSIS — Z00.00 WELL ADULT EXAM: ICD-10-CM

## 2024-04-20 DIAGNOSIS — E55.9 VITAMIN D DEFICIENCY: ICD-10-CM

## 2024-04-20 DIAGNOSIS — Z01.419 ENCOUNTER FOR GYNECOLOGICAL EXAMINATION: ICD-10-CM

## 2024-04-20 DIAGNOSIS — C50.412 MALIGNANT NEOPLASM OF UPPER-OUTER QUADRANT OF LEFT BREAST IN FEMALE, ESTROGEN RECEPTOR POSITIVE (HCC): ICD-10-CM

## 2024-04-20 DIAGNOSIS — F32.A ANXIETY AND DEPRESSION: ICD-10-CM

## 2024-04-20 DIAGNOSIS — E78.2 HYPERCHOLESTEROLEMIA WITH HYPERTRIGLYCERIDEMIA: ICD-10-CM

## 2024-04-20 DIAGNOSIS — F41.9 ANXIETY AND DEPRESSION: ICD-10-CM

## 2024-04-20 DIAGNOSIS — E53.8 VITAMIN B12 DEFICIENCY: ICD-10-CM

## 2024-04-20 DIAGNOSIS — Z17.0 MALIGNANT NEOPLASM OF UPPER-OUTER QUADRANT OF LEFT BREAST IN FEMALE, ESTROGEN RECEPTOR POSITIVE (HCC): ICD-10-CM

## 2024-04-20 DIAGNOSIS — Z00.00 WELL ADULT EXAM: Primary | ICD-10-CM

## 2024-04-20 LAB
ALBUMIN SERPL-MCNC: 4.5 G/DL (ref 3.2–4.8)
ALBUMIN/GLOB SERPL: 1.6 {RATIO} (ref 1–2)
ALP LIVER SERPL-CCNC: 59 U/L
ALT SERPL-CCNC: 14 U/L
ANION GAP SERPL CALC-SCNC: 7 MMOL/L (ref 0–18)
AST SERPL-CCNC: 26 U/L (ref ?–34)
BASOPHILS # BLD AUTO: 0.09 X10(3) UL (ref 0–0.2)
BASOPHILS NFR BLD AUTO: 1.1 %
BILIRUB SERPL-MCNC: 0.8 MG/DL (ref 0.3–1.2)
BUN BLD-MCNC: 11 MG/DL (ref 9–23)
BUN/CREAT SERPL: 14.7 (ref 10–20)
CALCIUM BLD-MCNC: 9.4 MG/DL (ref 8.7–10.4)
CHLORIDE SERPL-SCNC: 111 MMOL/L (ref 98–112)
CHOLEST SERPL-MCNC: 185 MG/DL (ref ?–200)
CO2 SERPL-SCNC: 24 MMOL/L (ref 21–32)
CREAT BLD-MCNC: 0.75 MG/DL
DEPRECATED RDW RBC AUTO: 46.2 FL (ref 35.1–46.3)
EGFRCR SERPLBLD CKD-EPI 2021: 96 ML/MIN/1.73M2 (ref 60–?)
EOSINOPHIL # BLD AUTO: 0.26 X10(3) UL (ref 0–0.7)
EOSINOPHIL NFR BLD AUTO: 3.1 %
ERYTHROCYTE [DISTWIDTH] IN BLOOD BY AUTOMATED COUNT: 13.9 % (ref 11–15)
EST. AVERAGE GLUCOSE BLD GHB EST-MCNC: 100 MG/DL (ref 68–126)
ESTRADIOL SERPL-MCNC: 126.6 PG/ML
FASTING PATIENT LIPID ANSWER: YES
FASTING STATUS PATIENT QL REPORTED: YES
FSH SERPL-ACNC: 7.7 MIU/ML
GLOBULIN PLAS-MCNC: 2.9 G/DL (ref 2.8–4.4)
GLUCOSE BLD-MCNC: 89 MG/DL (ref 70–99)
HBA1C MFR BLD: 5.1 % (ref ?–5.7)
HCT VFR BLD AUTO: 40.7 %
HDLC SERPL-MCNC: 57 MG/DL (ref 40–59)
HGB BLD-MCNC: 13.7 G/DL
IMM GRANULOCYTES # BLD AUTO: 0.01 X10(3) UL (ref 0–1)
IMM GRANULOCYTES NFR BLD: 0.1 %
LDLC SERPL CALC-MCNC: 97 MG/DL (ref ?–100)
LH SERPL-ACNC: 8.7 MIU/ML
LYMPHOCYTES # BLD AUTO: 1.86 X10(3) UL (ref 1–4)
LYMPHOCYTES NFR BLD AUTO: 22 %
MCH RBC QN AUTO: 30.3 PG (ref 26–34)
MCHC RBC AUTO-ENTMCNC: 33.7 G/DL (ref 31–37)
MCV RBC AUTO: 90 FL
MONOCYTES # BLD AUTO: 0.61 X10(3) UL (ref 0.1–1)
MONOCYTES NFR BLD AUTO: 7.2 %
NEUTROPHILS # BLD AUTO: 5.63 X10 (3) UL (ref 1.5–7.7)
NEUTROPHILS # BLD AUTO: 5.63 X10(3) UL (ref 1.5–7.7)
NEUTROPHILS NFR BLD AUTO: 66.5 %
NONHDLC SERPL-MCNC: 128 MG/DL (ref ?–130)
OSMOLALITY SERPL CALC.SUM OF ELEC: 293 MOSM/KG (ref 275–295)
PLATELET # BLD AUTO: 278 10(3)UL (ref 150–450)
POTASSIUM SERPL-SCNC: 4.5 MMOL/L (ref 3.5–5.1)
PROT SERPL-MCNC: 7.4 G/DL (ref 5.7–8.2)
RBC # BLD AUTO: 4.52 X10(6)UL
SODIUM SERPL-SCNC: 142 MMOL/L (ref 136–145)
TRIGL SERPL-MCNC: 184 MG/DL (ref 30–149)
TSI SER-ACNC: 1.59 MIU/ML (ref 0.55–4.78)
VIT B12 SERPL-MCNC: 344 PG/ML (ref 211–911)
VIT D+METAB SERPL-MCNC: 26.8 NG/ML (ref 30–100)
VLDLC SERPL CALC-MCNC: 30 MG/DL (ref 0–30)
WBC # BLD AUTO: 8.5 X10(3) UL (ref 4–11)

## 2024-04-20 PROCEDURE — 3079F DIAST BP 80-89 MM HG: CPT | Performed by: FAMILY MEDICINE

## 2024-04-20 PROCEDURE — 3075F SYST BP GE 130 - 139MM HG: CPT | Performed by: FAMILY MEDICINE

## 2024-04-20 PROCEDURE — 82670 ASSAY OF TOTAL ESTRADIOL: CPT

## 2024-04-20 PROCEDURE — 82607 VITAMIN B-12: CPT

## 2024-04-20 PROCEDURE — 80061 LIPID PANEL: CPT

## 2024-04-20 PROCEDURE — 83036 HEMOGLOBIN GLYCOSYLATED A1C: CPT

## 2024-04-20 PROCEDURE — 80053 COMPREHEN METABOLIC PANEL: CPT

## 2024-04-20 PROCEDURE — 83001 ASSAY OF GONADOTROPIN (FSH): CPT

## 2024-04-20 PROCEDURE — 99396 PREV VISIT EST AGE 40-64: CPT | Performed by: FAMILY MEDICINE

## 2024-04-20 PROCEDURE — 84443 ASSAY THYROID STIM HORMONE: CPT

## 2024-04-20 PROCEDURE — 36415 COLL VENOUS BLD VENIPUNCTURE: CPT

## 2024-04-20 PROCEDURE — 3008F BODY MASS INDEX DOCD: CPT | Performed by: FAMILY MEDICINE

## 2024-04-20 PROCEDURE — 82306 VITAMIN D 25 HYDROXY: CPT

## 2024-04-20 PROCEDURE — 83002 ASSAY OF GONADOTROPIN (LH): CPT

## 2024-04-20 PROCEDURE — 85025 COMPLETE CBC W/AUTO DIFF WBC: CPT

## 2024-04-20 RX ORDER — DEXMETHYLPHENIDATE HYDROCHLORIDE 25 MG/1
25 CAPSULE, EXTENDED RELEASE ORAL EVERY MORNING
COMMUNITY
Start: 2024-04-17

## 2024-04-20 RX ORDER — NYSTATIN 100000 [USP'U]/G
1 POWDER TOPICAL 4 TIMES DAILY
Qty: 60 G | Refills: 0 | Status: SHIPPED | OUTPATIENT
Start: 2024-04-20

## 2024-04-20 NOTE — PROGRESS NOTES
HPI:    Patient ID: Chelsey Barroso is a 53 year old female.    Gyn Exam  Pertinent negatives include no abdominal pain, arthralgias, chest pain, chills, congestion, coughing, fatigue, fever, headaches, myalgias, nausea, numbness, rash, sore throat, vomiting or weakness.     Chief Complaint   Patient presents with    Routine Physical    Gyn Exam     Pelvic only had breast exam with her oncologist        Wt Readings from Last 6 Encounters:   04/20/24 258 lb (117 kg)   02/29/24 264 lb 12.8 oz (120.1 kg)   02/21/24 265 lb 11.2 oz (120.5 kg)   08/15/23 259 lb 12.8 oz (117.8 kg)   08/10/23 258 lb (117 kg)   02/14/23 257 lb 3.2 oz (116.7 kg)     BP Readings from Last 3 Encounters:   04/20/24 131/86   02/29/24 126/78   02/21/24 145/88         Review of Systems   Constitutional:  Negative for activity change, appetite change, chills, fatigue, fever and unexpected weight change.   HENT:  Negative for congestion, dental problem, drooling, ear discharge, ear pain, facial swelling, hearing loss, mouth sores, nosebleeds, postnasal drip, rhinorrhea, sinus pressure, sinus pain, sneezing, sore throat, tinnitus, trouble swallowing and voice change.    Eyes:  Negative for pain, discharge, redness and visual disturbance.   Respiratory:  Negative for cough, shortness of breath and wheezing.    Cardiovascular:  Negative for chest pain, palpitations and leg swelling.   Gastrointestinal:  Negative for abdominal pain, anal bleeding, blood in stool, constipation, diarrhea, nausea, rectal pain and vomiting.   Endocrine: Negative for cold intolerance, heat intolerance, polydipsia, polyphagia and polyuria.   Genitourinary:  Negative for decreased urine volume, difficulty urinating, dysuria, flank pain, frequency, menstrual problem, pelvic pain, urgency, vaginal bleeding, vaginal discharge and vaginal pain.        Irregular menses, maybe 4 x year   Musculoskeletal:  Negative for arthralgias, back pain and myalgias.   Skin:  Negative for  rash.   Neurological:  Negative for dizziness, seizures, syncope, weakness, numbness and headaches.   Hematological:  Does not bruise/bleed easily.   Psychiatric/Behavioral:  Negative for behavioral problems, decreased concentration, self-injury, sleep disturbance and suicidal ideas. The patient is not nervous/anxious.        /86   Pulse 76   Temp 97.6 °F (36.4 °C) (Temporal)   Ht 5' 6.5\" (1.689 m)   Wt 258 lb (117 kg)   LMP 03/15/2024 (Approximate)   BMI 41.02 kg/m²     Past Medical History:    Anxiety state, unspecified    Attention deficit disorder    Breast CA (HCC)    Breast mass, left    has had mammo, ultrasound, biopsy    Cancer (HCC)    left breast    Colon adenoma    x1    Depression    High cholesterol    Hypercholesterolemia with hypertriglyceridemia    Morbid obesity with BMI of 40.0-44.9, adult (HCC)    Vitamin D deficiency     Past Surgical History:   Procedure Laterality Date    Benign biopsy left      Benign biopsy right            Colonoscopy  2022    Colonoscopy N/A 2022    Procedure: COLONOSCOPY;  Surgeon: Kt Bailey MD;  Location: [facility] ENDOSCOPY    Lumpectomy left Left 10/29/2018    Tiffany biopsy stereo nodule 2 site bilat (cpt=19081/47306)      negative    Needle biopsy left  10/08/2018    Needle biopsy right  10/08/2018    Radiation left  2019    Sinus surgery        Tonsillectomy       Social History     Socioeconomic History    Marital status:      Spouse name: Not on file    Number of children: Not on file    Years of education: Not on file    Highest education level: Not on file   Occupational History    Not on file   Tobacco Use    Smoking status: Former     Current packs/day: 0.00     Average packs/day: 0.5 packs/day for 10.0 years (5.0 ttl pk-yrs)     Types: Cigarettes     Start date: 10/24/1991     Quit date: 10/24/2001     Years since quittin.5    Smokeless tobacco: Never   Vaping Use    Vaping status: Never Used   Substance and  Sexual Activity    Alcohol use: Yes     Comment: socially    Drug use: No     Comment: never    Sexual activity: Not on file   Other Topics Concern     Service Not Asked    Blood Transfusions Not Asked    Caffeine Concern Yes     Comment: 1-2 cups of coffee    Occupational Exposure Not Asked    Hobby Hazards Not Asked    Sleep Concern Not Asked    Stress Concern Not Asked    Weight Concern Not Asked    Special Diet Not Asked    Back Care Not Asked    Exercise Not Asked    Bike Helmet Not Asked    Seat Belt Not Asked    Self-Exams Not Asked   Social History Narrative    Not on file     Social Determinants of Health     Financial Resource Strain: Not on file   Food Insecurity: Not on file   Transportation Needs: Not on file   Physical Activity: Not on file   Stress: Not on file   Social Connections: Not on file   Housing Stability: Not on file     Family History   Problem Relation Age of Onset    Breast Cancer Self     Other (Other) Mother         cushings syndrome    Heart Disorder Father     Diabetes Father     Hypertension Father     Breast Cancer Sister 47    Breast Cancer Sister 51    Cancer Maternal Grandfather 70        prostate/bladder ca    Breast Cancer Paternal Grandmother 43        d. 70    Cancer Maternal Uncle 80        unknown primary       Immunization History   Administered Date(s) Administered    Covid-19 Vaccine Pfizer 30 mcg/0.3 ml 06/05/2021, 06/26/2021    FLUZONE 6 months and older PFS 0.5 ml (60229) 11/10/2014, 08/13/2020    Influenza 09/03/2019, 09/03/2019    Pneumovax 23 02/24/2022    TDAP 01/10/2014, 09/13/2018, 04/07/2020    Tb Intradermal Test 01/10/2014, 01/20/2014       Health Maintenance   Topic Date Due    Zoster Vaccines (1 of 2) Never done    Annual Physical  02/24/2023    Pneumococcal Vaccine: Birth to 64yrs (2 of 2 - PCV) 02/24/2023    COVID-19 Vaccine (3 - 2023-24 season) 09/01/2023    Influenza Vaccine (Season Ended) 10/01/2024    Pap Smear  02/24/2025    Mammogram   03/07/2025    Colorectal Cancer Screening  05/20/2029    DTaP,Tdap,and Td Vaccines (4 - Td or Tdap) 04/07/2030    Annual Depression Screening  Completed          Current Outpatient Medications   Medication Sig Dispense Refill    Dexmethylphenidate HCl ER 25 MG Oral Capsule SR 24 Hr Take 1 capsule (25 mg total) by mouth every morning.      Nystatin [id number] UNIT/GM External Powder Apply 1 Application topically 4 (four) times daily. 60 g 0    ALPRAZolam 0.5 MG Oral Tab Take 1 tablet (0.5 mg total) by mouth.      VRAYLAR 3 MG Oral Cap 3 mg.      sertraline 100 MG Oral Tab Take 1.5 tablets (150 mg total) by mouth daily.      traZODone HCl 100 MG Oral Tab Take 2 tablets (200 mg total) by mouth. At Bedtime  1    Multiple Vitamin (MULTIVITAMINS) Oral Cap Take 1 capsule by mouth daily.      ondansetron (ZOFRAN) 4 mg tablet Take 1 tablet (4 mg total) by mouth every 8 (eight) hours as needed for Nausea. (Patient not taking: Reported on 4/20/2024) 20 tablet 2     Allergies:  Allergies   Allergen Reactions    Bactrim [Sulfamethoxazole W/Trimethoprim] HIVES    Latex HIVES and RASH      PHYSICAL EXAM:     Chief Complaint   Patient presents with    Routine Physical    Gyn Exam     Pelvic only had breast exam with her oncologist       Physical Exam  Vitals and nursing note reviewed.   Constitutional:       Appearance: She is well-developed.   HENT:      Head: Normocephalic and atraumatic.      Right Ear: External ear normal.      Left Ear: External ear normal.      Nose: Nose normal.      Mouth/Throat:      Pharynx: No oropharyngeal exudate.   Eyes:      General:         Right eye: No discharge.         Left eye: No discharge.      Conjunctiva/sclera: Conjunctivae normal.      Pupils: Pupils are equal, round, and reactive to light.   Neck:      Thyroid: No thyromegaly.   Cardiovascular:      Rate and Rhythm: Normal rate and regular rhythm.      Heart sounds: Normal heart sounds. No murmur heard.  Pulmonary:      Effort: Pulmonary  effort is normal.      Breath sounds: Normal breath sounds. No wheezing.   Abdominal:      General: Bowel sounds are normal.      Palpations: Abdomen is soft. There is no mass.      Tenderness: There is no abdominal tenderness.   Musculoskeletal:         General: No tenderness.      Cervical back: Normal range of motion and neck supple.   Lymphadenopathy:      Cervical: No cervical adenopathy.   Skin:     General: Skin is dry.      Findings: Rash present.      Comments: Some erythema under left breast, well demarcated .   Neurological:      Mental Status: She is alert and oriented to person, place, and time.      Cranial Nerves: No cranial nerve deficit.      Motor: No abnormal muscle tone.      Coordination: Coordination normal.      Deep Tendon Reflexes: Reflexes are normal and symmetric. Reflexes normal.   Psychiatric:         Behavior: Behavior normal.         Thought Content: Thought content normal.         Judgment: Judgment normal.                ASSESSMENT/PLAN:     Return yearly for physicals  Follow up with dentist every 6 months  Follow up with eye doctor yearly  Recommend aerobic exercise for at least 30mins 5 days a week  Yearly flu shot  Tetanus booster every 10 years (Tdap/ Td)  Labs ordered/ or reviewed if done prior to appointment     Encounter Diagnoses   Name Primary?    Well adult exam Yes    Anxiety and depression     Vitamin B12 deficiency     Hypercholesterolemia with hypertriglyceridemia     Malignant neoplasm of upper-outer quadrant of left breast in female, estrogen receptor positive (HCC)     Encounter for gynecological examination     Morbid obesity with BMI of 40.0-44.9, adult ([facility])     Vitamin D deficiency     Irregular menses     Skin candidiasis        1. Well adult exam    - CBC With Differential With Platelet; Future  - Comp Metabolic Panel (14); Future  - Lipid Panel; Future  - TSH W Reflex To Free T4; Future  - Hemoglobin A1C; Future    2. Anxiety and depression  stable    3.  Vitamin B12 deficiency  replace  - Vitamin B12; Future    4. Hypercholesterolemia with hypertriglyceridemia      5. Malignant neoplasm of upper-outer quadrant of left breast in female, estrogen receptor positive (HCC)  Stable  Sees oncology  On tamoxifen    6. Encounter for gynecological examination  Pelvic exam done  Breast exam per oncology    7. Morbid obesity with BMI of 40.0-44.9, adult ([facility])  Seeing weight management     8. Vitamin D deficiency  replace  - Vitamin D [E]; Future    9. Irregular menses  perimenopausal  - Estradiol; Future  - LH (Luteinizing Hormone); Future  - FSH; Future  - US PELVIS (TRANSABDOMINAL AND TRANSVAGINAL) (CPT=76856/66575); Future    10. Skin candidiasis  Keep skin dry   Powder as directed   - Nystatin [id number] UNIT/GM External Powder; Apply 1 Application topically 4 (four) times daily.  Dispense: 60 g; Refill: 0      Orders Placed This Encounter   Procedures    CBC With Differential With Platelet    Comp Metabolic Panel (14)    Lipid Panel    TSH W Reflex To Free T4    Vitamin B12    Hemoglobin A1C    Vitamin D [E]    Estradiol    LH (Luteinizing Hormone)    FSH    ZOSTER VACC RECOMBINANT IM NJX    PCV20 VACCINE FOR INTRAMUSCULAR USE       The above note was creating using Dragon speech recognition technology. Please excuse any typos    Meds This Visit:  Requested Prescriptions     Signed Prescriptions Disp Refills    Nystatin [id number] UNIT/GM External Powder 60 g 0     Sig: Apply 1 Application topically 4 (four) times daily.       Imaging & Referrals:  ZOSTER VACC RECOMBINANT IM NJX  PCV20 VACCINE FOR INTRAMUSCULAR USE  US PELVIS (TRANSABDOMINAL AND TRANSVAGINAL) (CPT=76856/88793)       ID#[id number]

## 2024-07-29 ENCOUNTER — HOSPITAL ENCOUNTER (OUTPATIENT)
Dept: ULTRASOUND IMAGING | Facility: HOSPITAL | Age: 54
Discharge: HOME | End: 2024-07-29
Attending: FAMILY MEDICINE
Payer: COMMERCIAL

## 2024-07-29 DIAGNOSIS — N92.6 IRREGULAR MENSES: ICD-10-CM

## 2024-07-29 PROCEDURE — 76830 TRANSVAGINAL US NON-OB: CPT | Performed by: FAMILY MEDICINE

## 2024-07-29 PROCEDURE — 76856 US EXAM PELVIC COMPLETE: CPT | Performed by: FAMILY MEDICINE

## 2024-09-12 ENCOUNTER — OFFICE VISIT (OUTPATIENT)
Dept: SURGERY | Facility: CLINIC | Age: 54
End: 2024-09-12
Payer: COMMERCIAL

## 2024-09-12 VITALS
WEIGHT: 273 LBS | HEIGHT: 65.7 IN | OXYGEN SATURATION: 98 % | BODY MASS INDEX: 44.4 KG/M2 | SYSTOLIC BLOOD PRESSURE: 126 MMHG | DIASTOLIC BLOOD PRESSURE: 80 MMHG | HEART RATE: 90 BPM

## 2024-09-12 DIAGNOSIS — E66.01 MORBID OBESITY WITH BMI OF 40.0-44.9, ADULT (HCC): ICD-10-CM

## 2024-09-12 DIAGNOSIS — Z51.81 ENCOUNTER FOR THERAPEUTIC DRUG MONITORING: ICD-10-CM

## 2024-09-12 DIAGNOSIS — F98.8 ATTENTION DEFICIT DISORDER (ADD) WITHOUT HYPERACTIVITY: ICD-10-CM

## 2024-09-12 DIAGNOSIS — E78.2 HYPERCHOLESTEROLEMIA WITH HYPERTRIGLYCERIDEMIA: Primary | ICD-10-CM

## 2024-09-12 PROCEDURE — 3074F SYST BP LT 130 MM HG: CPT | Performed by: INTERNAL MEDICINE

## 2024-09-12 PROCEDURE — 99214 OFFICE O/P EST MOD 30 MIN: CPT | Performed by: INTERNAL MEDICINE

## 2024-09-12 PROCEDURE — 3079F DIAST BP 80-89 MM HG: CPT | Performed by: INTERNAL MEDICINE

## 2024-09-12 PROCEDURE — 3008F BODY MASS INDEX DOCD: CPT | Performed by: INTERNAL MEDICINE

## 2024-09-12 NOTE — PROGRESS NOTES
[facility], [facility], [place]  [address]  [place] [address]  Dept: [phone]       Patient:  Chelsey Barroso  :      1970  MRN:      [id number]    Chief Complaint:    Chief Complaint   Patient presents with    Follow - Up    Weight Management       SUBJECTIVE     History of Present Illness:  Chelsey is being seen today for a follow-up for non surgical weight loss.     Past Medical History:   Past Medical History:    Anxiety state, unspecified    Attention deficit disorder    Breast CA (HCC)    Breast mass, left    has had mammo, ultrasound, biopsy    Cancer (HCC)    left breast    Colon adenoma    x1    Depression    High cholesterol    Hypercholesterolemia with hypertriglyceridemia    Morbid obesity with BMI of 40.0-44.9, adult (HCC)    Vitamin D deficiency        Comorbidities:  Joint pain-Improvement?  yes, GERD-Improvement?  yes, Diabetes-Improvement?  yes, Hypertension-Improvement?  yes, Heart disease-Improvement?  yes, Hyperlipidemia-Improvement?  yes, SURENDRA-Improvement?  yes and Snoring-Improvement?  yes    OBJECTIVE     Vitals: /80 (BP Location: Left arm, Patient Position: Sitting, Cuff Size: adult)   Pulse 90   Ht 5' 5.7\" (1.669 m)   Wt 273 lb (123.8 kg)   LMP 03/15/2024 (Approximate)   SpO2 98%   BMI 44.47 kg/m²     Initial weight loss: +09   Total weight loss: +10    Start weight: 254    Wt Readings from Last 3 Encounters:   24 273 lb (123.8 kg)   24 258 lb (117 kg)   24 264 lb 12.8 oz (120.1 kg)       Patient Medications:    Current Outpatient Medications   Medication Sig Dispense Refill    CUSTOM MEDICATION Semaglutide/ cyanocobalamin    0.25 mg-   concentration: 1 /0.5 mg/ ML  Amount:  1 Ml vial  Instructions: inject 25 units/ 0.25 ML q weekly 1 each 5    Dexmethylphenidate HCl ER 25 MG Oral Capsule SR 24 Hr Take 1 capsule (25 mg total) by mouth every morning.      Nystatin [id number] UNIT/GM External  Powder Apply 1 Application topically 4 (four) times daily. 60 g 0    ALPRAZolam 0.5 MG Oral Tab Take 1 tablet (0.5 mg total) by mouth.      VRAYLAR 3 MG Oral Cap 3 mg.      sertraline 100 MG Oral Tab Take 1.5 tablets (150 mg total) by mouth daily.      ondansetron (ZOFRAN) 4 mg tablet Take 1 tablet (4 mg total) by mouth every 8 (eight) hours as needed for Nausea. (Patient not taking: Reported on 2024) 20 tablet 2    traZODone HCl 100 MG Oral Tab Take 2 tablets (200 mg total) by mouth. At Bedtime  1    Multiple Vitamin (MULTIVITAMINS) Oral Cap Take 1 capsule by mouth daily.       Allergies:  Bactrim [sulfamethoxazole w/trimethoprim] and Latex     Social History:    Social History     Socioeconomic History    Marital status:      Spouse name: Not on file    Number of children: Not on file    Years of education: Not on file    Highest education level: Not on file   Occupational History    Not on file   Tobacco Use    Smoking status: Former     Current packs/day: 0.00     Average packs/day: 0.5 packs/day for 10.0 years (5.0 ttl pk-yrs)     Types: Cigarettes     Start date: 10/24/1991     Quit date: 10/24/2001     Years since quittin.9    Smokeless tobacco: Never   Vaping Use    Vaping status: Never Used   Substance and Sexual Activity    Alcohol use: Yes     Comment: socially    Drug use: No     Comment: never    Sexual activity: Not on file   Other Topics Concern     Service Not Asked    Blood Transfusions Not Asked    Caffeine Concern Yes     Comment: 1-2 cups of coffee    Occupational Exposure Not Asked    Hobby Hazards Not Asked    Sleep Concern Not Asked    Stress Concern Not Asked    Weight Concern Not Asked    Special Diet Not Asked    Back Care Not Asked    Exercise Not Asked    Bike Helmet Not Asked    Seat Belt Not Asked    Self-Exams Not Asked   Social History Narrative    Not on file     Social Determinants of Health     Financial Resource Strain: Not on file   Food Insecurity: Not  on file   Transportation Needs: Not on file   Physical Activity: Not on file   Stress: Not on file   Social Connections: Not on file   Housing Stability: Not on file     Surgical History:    Past Surgical History:   Procedure Laterality Date    Benign biopsy left      Benign biopsy right            Colonoscopy  2022    Colonoscopy N/A 2022    Procedure: COLONOSCOPY;  Surgeon: Kt Bailey MD;  Location: [facility] ENDOSCOPY    Lumpectomy left Left 10/29/2018    Tiffany biopsy stereo nodule 2 site bilat (cpt=19081/13031)      negative    Needle biopsy left  10/08/2018    Needle biopsy right  10/08/2018    Radiation left  2019    Sinus surgery        Tonsillectomy       Family History:    Family History   Problem Relation Age of Onset    Breast Cancer Self     Other (Other) Mother         cushings syndrome    Heart Disorder Father     Diabetes Father     Hypertension Father     Breast Cancer Sister 47    Breast Cancer Sister 51    Cancer Maternal Grandfather 70        prostate/bladder ca    Breast Cancer Paternal Grandmother 43        d. 70    Cancer Maternal Uncle 80        unknown primary       Food Journal  Reviewed and Discussed:       Patient has a Food Journal?: yes   Patient is reading nutrition labels?  yes  Average Caloric Intake:     Average CHO Intake: 130  Is patient exercising? no  Type of exercise?     Eating Habits  Patient states the following:  Eats 3 meal(s) per day  Length of time it takes to consume a meal:  20  # of snacks per day: 1 Type of snacks:  Tortilla chips, fruit  Amount of soda consumption per day:  La croix  Amount of water (in ounces) per day:  48  Drinking between meals only:  yes  Toughest challenge:  exercise    Nutritional Goals  Limit carbohydrates to 100 gms per day, Eat 100-200 calories within 1 hour of waking  and Eat 3-4 cups of fresh fruits or vegetables daily    Behavior Modifications Reviewed and Discussed  Eat breakfast, Eat 3 meals per day, Plan  meals in advance, Read nutrition labels, Drink 64 oz of water per day, Maintain a daily food journal, No drinking 30 minutes before or after meals, Utlize portion control strategies to reduce calorie intake, Identify triggers for eating and manage cues and Eat slowly and take 20 to 30 minutes to complete each meal    Exercise Goals Reviewed and Discussed    Needs to start walking    ROS:    Constitutional: positive for fatigue  Respiratory: positive for dyspnea on exertion  Cardiovascular: negative  Gastrointestinal: positive for reflux symptoms  Musculoskeletal:positive for arthralgias and back pain  Neurological: negative  Behavioral/Psych: positive for anxiety  Endocrine: negative  All other systems were reviewed and are negative    Physical Exam:   General appearance: alert, appears stated age and cooperative  Head: Normocephalic, without obvious abnormality, atraumatic  Eyes: conjunctivae/corneas clear. PERRL, EOM's intact. Fundi benign.  Back: symmetric, no curvature. ROM normal. No CVA tenderness.  Lungs: clear to auscultation bilaterally  Heart: S1, S2 normal, no murmur, click, rub or gallop, regular rate and rhythm  Abdomen: soft, non-tender; bowel sounds normal; no masses,  no organomegaly  Extremities: extremities normal, atraumatic, no cyanosis or edema  Pulses: 2+ and symmetric  Skin: Skin color, texture, turgor normal. No rashes or lesions    ASSESSMENT       Encounter Diagnosis(ses):   Encounter Diagnoses   Name Primary?    Hypercholesterolemia with hypertriglyceridemia Yes    Encounter for therapeutic drug monitoring     Attention deficit disorder (ADD) without hyperactivity     Morbid obesity with BMI of 40.0-44.9, adult (HCC)          PLAN     Patient is not interested in bariatric surgery. Patient desires to pursue traditional weight loss at this time.        Binge eating increased      Goals for next month:  1. Keep a food log.  2. Drink 48-64 ounces of non-caloric beverages per day. No fruit  juices or regular soda.  3. Increase activity-upper body exercises, walk 10 minutes per day.  4. Increase fruit and vegetable servings to 5-6 per day.          GLP not covered    Compound semaglutide is a custom-made medication that mimics the GLP-1 hormone. It is used to treat type 2 diabetes and lower the risk of heart or blood vessel disease. It works by increasing insulin release, lowering glucagon release, delaying gastric emptying and reducing appetite. Compound semaglutide is prescribed when an FDA-approved medication, dose, or dosage form is unavailable (ie. Nationwide shortage or no obesity coverage for GLP-1 meds).   Cost of these medications is  dollars monthly based on dose.  Will start at 0.25 mg        Needs to ambulate          Diagnoses and all orders for this visit:    Hypercholesterolemia with hypertriglyceridemia    Encounter for therapeutic drug monitoring    Attention deficit disorder (ADD) without hyperactivity    Morbid obesity with BMI of 40.0-44.9, adult (HCC)  -     CUSTOM MEDICATION; Semaglutide/ cyanocobalamin    0.25 mg-   concentration: 1 /0.5 mg/ ML  Amount:  1 Ml vial  Instructions: inject 25 units/ 0.25 ML q weekly            James Ponce MD

## 2024-09-16 NOTE — PROGRESS NOTES
Goal Outcome Evaluation:  PRIMARY DIAGNOSIS: ACUTE PAIN  OUTPATIENT/OBSERVATION GOALS TO BE MET BEFORE DISCHARGE:  1. Pain Status: Improved but still requiring IV narcotics.    2. Return to near baseline physical activity: No    3. Cleared for discharge by consultants (if involved): No    Discharge Planner Nurse   Safe discharge environment identified: No  Barriers to discharge: Yes    Pt NPO status, sent to MRI, waiting results, independent, A&OX4, PRN IV pain meds, currently pain level 0/10.        Entered by: Juliet Garcias RN 09/16/2024 12:57 PM     Please review provider order for any additional goals.   Nurse to notify provider when observation goals have been met and patient is ready for discharge.        Plan of Care Reviewed With: patient    Overall Patient Progress: improvingOverall Patient Progress: improving    Problem: Adult Inpatient Plan of Care  Goal: Plan of Care Review  Description: The Plan of Care Review/Shift note should be completed every shift.  The Outcome Evaluation is a brief statement about your assessment that the patient is improving, declining, or no change.  This information will be displayed automatically on your shift  note.  Outcome: Progressing  Flowsheets (Taken 9/16/2024 1255)  Plan of Care Reviewed With: patient  Overall Patient Progress: improving             Ok to schedule colonoscopy with MAC with split golytely for colorectal cancer screening at hospital or [facility]. If she is on adderall for ADHD. She won't be able to hold the medication 7 days prior per anesthesia recommendations. Will she have to see them ahead of time or will they need to be contacted?      Thanks    Tamara Bran MD  [facility] - Gastroenterology

## 2024-09-21 ENCOUNTER — HOSPITAL ENCOUNTER (OUTPATIENT)
Dept: MAMMOGRAPHY | Facility: HOSPITAL | Age: 54
Discharge: HOME | End: 2024-09-21
Attending: INTERNAL MEDICINE
Payer: COMMERCIAL

## 2024-09-21 DIAGNOSIS — Z12.31 SCREENING MAMMOGRAM FOR BREAST CANCER: ICD-10-CM

## 2024-09-21 PROCEDURE — 77063 BREAST TOMOSYNTHESIS BI: CPT | Performed by: INTERNAL MEDICINE

## 2024-09-21 PROCEDURE — 77067 SCR MAMMO BI INCL CAD: CPT | Performed by: INTERNAL MEDICINE

## 2025-01-14 DIAGNOSIS — E66.01 MORBID OBESITY WITH BMI OF 40.0-44.9, ADULT (HCC): Primary | ICD-10-CM

## 2025-02-25 ENCOUNTER — OFFICE VISIT (OUTPATIENT)
Age: 55
End: 2025-02-25
Attending: INTERNAL MEDICINE
Payer: COMMERCIAL

## 2025-02-25 VITALS
RESPIRATION RATE: 18 BRPM | OXYGEN SATURATION: 96 % | TEMPERATURE: 98 F | BODY MASS INDEX: 43.23 KG/M2 | WEIGHT: 269 LBS | DIASTOLIC BLOOD PRESSURE: 86 MMHG | HEIGHT: 66 IN | HEART RATE: 76 BPM | SYSTOLIC BLOOD PRESSURE: 141 MMHG

## 2025-02-25 DIAGNOSIS — Z85.3 ENCOUNTER FOR FOLLOW-UP SURVEILLANCE OF BREAST CANCER: ICD-10-CM

## 2025-02-25 DIAGNOSIS — Z12.39 BREAST CANCER SCREENING, HIGH RISK PATIENT: ICD-10-CM

## 2025-02-25 DIAGNOSIS — Z08 ENCOUNTER FOR FOLLOW-UP SURVEILLANCE OF BREAST CANCER: ICD-10-CM

## 2025-02-25 DIAGNOSIS — Z12.39 BREAST CANCER SCREENING OTHER THAN MAMMOGRAM: ICD-10-CM

## 2025-02-25 DIAGNOSIS — C50.412 MALIGNANT NEOPLASM OF UPPER-OUTER QUADRANT OF LEFT BREAST IN FEMALE, ESTROGEN RECEPTOR POSITIVE (HCC): Primary | ICD-10-CM

## 2025-02-25 DIAGNOSIS — Z12.31 SCREENING MAMMOGRAM FOR BREAST CANCER: ICD-10-CM

## 2025-02-25 DIAGNOSIS — Z17.0 MALIGNANT NEOPLASM OF UPPER-OUTER QUADRANT OF LEFT BREAST IN FEMALE, ESTROGEN RECEPTOR POSITIVE (HCC): Primary | ICD-10-CM

## 2025-02-25 NOTE — PROGRESS NOTES
HPI     Chelsey Barroso is a 54 year old female here for f/u of   Encounter Diagnoses   Name Primary?    Malignant neoplasm of upper-outer quadrant of left breast in female, estrogen receptor positive (HCC) Yes    Screening mammogram for breast cancer        Menses still monthly, at times skips a week.  Heavier.      No other changes on SBE.    No problems with the L arm with edema.    States weight is stable. States hard time loosing weight.        ECOG PS 0    Review of Systems:   Review of Systems   Constitutional:  Negative for appetite change, fatigue and unexpected weight change.   Respiratory:  Negative for cough and shortness of breath.    Cardiovascular:  Negative for chest pain.   Gastrointestinal:  Negative for abdominal pain.   Endocrine: Negative for hot flashes.   Musculoskeletal:  Positive for arthralgias (shoulders.) and back pain (chronic lumbar spine pain, improved. ). Negative for neck pain.        No bone pain   Neurological:  Negative for dizziness and headaches.   Hematological:  Negative for adenopathy.   Psychiatric/Behavioral:  Negative for sleep disturbance.            Current Outpatient Medications   Medication Sig Dispense Refill    CUSTOM MEDICATION Semaglutide/ cyanocobalamin    0.5 mg-   Concentration: 1/0.5 mg/ML   Amount: 2.5 ML vial  Instructions: Inject 50 units/ 0.5 ML q weekly 1 each 5    Dexmethylphenidate HCl ER 25 MG Oral Capsule SR 24 Hr Take 1 capsule (25 mg total) by mouth every morning.      Nystatin [id number] UNIT/GM External Powder Apply 1 Application topically 4 (four) times daily. 60 g 0    ALPRAZolam 0.5 MG Oral Tab Take 1 tablet (0.5 mg total) by mouth.      VRAYLAR 3 MG Oral Cap 3 mg.      sertraline 100 MG Oral Tab Take 1.5 tablets (150 mg total) by mouth daily.      ondansetron (ZOFRAN) 4 mg tablet Take 1 tablet (4 mg total) by mouth every 8 (eight) hours as needed for Nausea. 20 tablet 2    traZODone HCl 100 MG Oral Tab Take 2 tablets (200 mg total) by mouth.  At Bedtime  1    Multiple Vitamin (MULTIVITAMINS) Oral Cap Take 1 capsule by mouth daily.       Allergies:   Allergies   Allergen Reactions    Bactrim [Sulfamethoxazole W/Trimethoprim] HIVES    Latex HIVES and RASH       Past Medical History:    Anxiety state, unspecified    Attention deficit disorder    Breast CA (HCC)    Breast mass, left    has had mammo, ultrasound, biopsy    Cancer (HCC)    left breast    Colon adenoma    x1    Depression    High cholesterol    Hypercholesterolemia with hypertriglyceridemia    Morbid obesity with BMI of 40.0-44.9, adult (HCC)    Vitamin D deficiency     Past Surgical History:   Procedure Laterality Date    Benign biopsy left      Benign biopsy right            Colonoscopy  2022    Colonoscopy N/A 2022    Procedure: COLONOSCOPY;  Surgeon: Kt Bailey MD;  Location: [facility] ENDOSCOPY    Lumpectomy left Left 10/29/2018    Tiffany biopsy stereo nodule 2 site bilat (cpt=19081/[id number])      negative    Needle biopsy left  10/08/2018    Needle biopsy right  10/08/2018    Radiation left  2019    Sinus surgery        Tonsillectomy       Social History     Socioeconomic History    Marital status:    Tobacco Use    Smoking status: Former     Current packs/day: 0.00     Average packs/day: 0.5 packs/day for 10.0 years (5.0 ttl pk-yrs)     Types: Cigarettes     Start date: 10/24/1991     Quit date: 10/24/2001     Years since quittin.3    Smokeless tobacco: Never   Vaping Use    Vaping status: Never Used   Substance and Sexual Activity    Alcohol use: Yes     Comment: socially    Drug use: No     Comment: never   Other Topics Concern    Caffeine Concern Yes     Comment: 1-2 cups of coffee       Family History   Problem Relation Age of Onset    Breast Cancer Self     Other (Other) Mother         cushings syndrome    Heart Disorder Father     Diabetes Father     Hypertension Father     Breast Cancer Sister 47    Breast Cancer Sister 51    Cancer Maternal  Grandfather 70        prostate/bladder ca    Breast Cancer Paternal Grandmother 43        d. 70    Cancer Maternal Uncle 80        unknown primary         PHYSICAL EXAM:    /86 (BP Location: Left arm, Patient Position: Sitting, Cuff Size: large)   Pulse 76   Temp 98 °F (36.7 °C) (Tympanic)   Resp 18   Ht 1.676 m (5' 6\")   Wt 122 kg (269 lb)   LMP 03/15/2024 (Approximate)   SpO2 96%   BMI 43.42 kg/m²   Wt Readings from Last 6 Encounters:   02/25/25 122 kg (269 lb)   09/12/24 123.8 kg (273 lb)   04/20/24 117 kg (258 lb)   02/29/24 120.1 kg (264 lb 12.8 oz)   02/21/24 120.5 kg (265 lb 11.2 oz)   08/15/23 117.8 kg (259 lb 12.8 oz)     General: Patient is alert, not in acute distress.  HEENT: EOMs intact. PERRL.   Neck: No JVD. No palpable lymphadenopathy. Neck is supple.  Chest: Clear to auscultation.  Breasts: R w/o masses, prominent breast tissue in the R axilla.  L breast w/o masses, surgical scar and RT changes in the axilla.  Heart: Regular rate and rhythm.   Abdomen: Soft, non tender with good bowel sounds.  Extremities: No edema.  Neurological: Grossly intact.   Lymphatics: There is no palpable lymphadenopathy throughout in the cervical, supraclavicular, axillary, or inguinal regions.  Psych/Depression: nl        ASSESSMENT/PLAN:     Encounter Diagnoses   Name Primary?    Malignant neoplasm of upper-outer quadrant of left breast in female, estrogen receptor positive (HCC) Yes    Screening mammogram for breast cancer        Cancer Staging  Malignant neoplasm of upper-outer quadrant of left breast in female, estrogen receptor positive (HCC)  Staging form: Breast, AJCC 8th Edition  - Pathologic stage from 11/7/2018: Stage IA (pT1a, pN0, cM0, G2, ER: Positive, SC: Positive, HER2: Negative) - Signed by Deacon Mclena MD on 2/6/2019    Patient completed local treatment with lumpectomy October 29, 2018.  [place] lymph node biopsy was completed on November 12, 2018.  This was negative.     The patient  completed radiation therapy to the left breast from 12/26/18 to 1/24/19.     Completed tamoxifen in February of 2024.       B mammogram in 9/21/24 BI-RADS 2.  Due again in September 2025.    I of the breast on 3/7/2024 BI-RADS 2, he will be due again in March 2025.  Order given.    Yearly follow-up.  Continue with imaging as above.    Going to Dr. Ponce for weight loss.     No orders of the defined types were placed in this encounter.    Results From Past 48 Hours:  No results found for this or any previous visit (from the past 48 hours).    [facility] low    Imaging & Referrals:  None   No orders of the defined types were placed in this encounter.    PROCEDURE: [facility] XIMENA 2D+3D SCREENING BILAT ([id number])     COMPARISON: [facility], [facility] XIMENA 2D+3D DIAGNOSTIC MARISOL BILAT (RAD=[id number]), 5/28/2021, 11:13 AM.  [facility], [facility] XIMENA 2D+3D SCREENING BILAT ([id number]), 7/02/2022, 10:49 AM.  [facility], [facility] XIMENA 2D+3D SCREENING BILAT ([id number]), 9/16/2023, 12:44 PM.     INDICATIONS: Z12.31 Screening mammogram for breast cancer     TECHNIQUE: Full field direct screening mammography was performed and images were reviewed with the [employer] KISHORE 1.5.1.5 CAD device.  3D tomosynthesis was performed and reviewed        BREAST COMPOSITION:   Category b-Scattered areas fibroglandular density.        FINDINGS: Postsurgical change again seen in the left breast.  Right breast biopsy clip noted.  The parenchymal pattern in both breasts is not significantly changed.  No suspicious mass, calcification or distortion is seen in either breast.                 Impression  CONCLUSION:   No mammographic evidence of malignancy.     BI-RADS CATEGORY:    DIAGNOSTIC CATEGORY 2 - BENIGN FINDING:       RECOMMENDATIONS:  ROUTINE MAMMOGRAM AND CLINICAL EVALUATION IN 12 MONTHS.                   PLEASE NOTE: NORMAL MAMMOGRAM DOES NOT EXCLUDE THE POSSIBILITY OF BREAST CANCER.  A  CLINICALLY SUSPICIOUS PALPABLE LUMP SHOULD BE BIOPSIED.       For patients over the age of 40, the target due date for the patient's next mammogram has been entered into a reminder system.       Patient received a discharge summary from the technologist after completion of exam.     Breast marker legend used on images    Triangle = Palpable lump  [place] = Skin tag or mole  BB = Nipple  Linear tejinder = Scar  Square = Pain           Dictated by (CST): Jorge Mendez MD on 9/23/2024 at 2:14 PM      Finalized by (CST): Jorge Mendez MD on 9/23/2024 at 2:16 PM          [address] [place] [address]  [phone]

## 2025-03-12 ENCOUNTER — OFFICE VISIT (OUTPATIENT)
Dept: SURGERY | Facility: CLINIC | Age: 55
End: 2025-03-12
Payer: COMMERCIAL

## 2025-03-12 ENCOUNTER — LAB ENCOUNTER (OUTPATIENT)
Dept: LAB | Facility: HOSPITAL | Age: 55
End: 2025-03-12
Attending: INTERNAL MEDICINE
Payer: COMMERCIAL

## 2025-03-12 VITALS
HEIGHT: 65.7 IN | BODY MASS INDEX: 43.26 KG/M2 | SYSTOLIC BLOOD PRESSURE: 134 MMHG | DIASTOLIC BLOOD PRESSURE: 80 MMHG | HEART RATE: 94 BPM | WEIGHT: 266 LBS | RESPIRATION RATE: 16 BRPM

## 2025-03-12 DIAGNOSIS — E78.5 DYSLIPIDEMIA: Primary | ICD-10-CM

## 2025-03-12 DIAGNOSIS — F98.8 ATTENTION DEFICIT DISORDER (ADD) WITHOUT HYPERACTIVITY: ICD-10-CM

## 2025-03-12 DIAGNOSIS — E66.01 MORBID OBESITY WITH BMI OF 40.0-44.9, ADULT (HCC): ICD-10-CM

## 2025-03-12 DIAGNOSIS — E78.5 DYSLIPIDEMIA: ICD-10-CM

## 2025-03-12 DIAGNOSIS — Z51.81 ENCOUNTER FOR THERAPEUTIC DRUG MONITORING: ICD-10-CM

## 2025-03-12 LAB
ATRIAL RATE: 91 BPM
P AXIS: 48 DEGREES
P-R INTERVAL: 130 MS
Q-T INTERVAL: 364 MS
QRS DURATION: 82 MS
QTC CALCULATION (BEZET): 447 MS
R AXIS: 26 DEGREES
T AXIS: 27 DEGREES
VENTRICULAR RATE: 91 BPM

## 2025-03-12 PROCEDURE — 3008F BODY MASS INDEX DOCD: CPT | Performed by: INTERNAL MEDICINE

## 2025-03-12 PROCEDURE — 99214 OFFICE O/P EST MOD 30 MIN: CPT | Performed by: INTERNAL MEDICINE

## 2025-03-12 PROCEDURE — 93005 ELECTROCARDIOGRAM TRACING: CPT

## 2025-03-12 PROCEDURE — 93010 ELECTROCARDIOGRAM REPORT: CPT | Performed by: INTERNAL MEDICINE

## 2025-03-12 PROCEDURE — 3075F SYST BP GE 130 - 139MM HG: CPT | Performed by: INTERNAL MEDICINE

## 2025-03-12 PROCEDURE — 3079F DIAST BP 80-89 MM HG: CPT | Performed by: INTERNAL MEDICINE

## 2025-03-12 RX ORDER — PHENTERMINE HYDROCHLORIDE 15 MG/1
15 CAPSULE ORAL EVERY MORNING
Qty: 30 CAPSULE | Refills: 5 | Status: SHIPPED | OUTPATIENT
Start: 2025-03-12

## 2025-03-12 NOTE — PROGRESS NOTES
[facility], [facility], [place]  [address]  [facility] [address]  Dept: [phone]       Patient:  Chelsey Barroso  :      1970  MRN:      [id number]    Chief Complaint:    Chief Complaint   Patient presents with    Follow - Up    Weight Management       SUBJECTIVE     History of Present Illness:  Chelsey is being seen today for a follow-up for non surgical weight loss.     Past Medical History:   Past Medical History:    Anxiety state, unspecified    Attention deficit disorder    Breast CA (HCC)    Breast mass, left    has had mammo, ultrasound, biopsy    Cancer (HCC)    left breast    Colon adenoma    x1    Depression    High cholesterol    Hypercholesterolemia with hypertriglyceridemia    Morbid obesity with BMI of 40.0-44.9, adult (HCC)    Vitamin D deficiency        Comorbidities:  Joint pain-Improvement?  yes, GERD-Improvement?  yes, Diabetes-Improvement?  yes, Hypertension-Improvement?  yes, Heart disease-Improvement?  yes, Hyperlipidemia-Improvement?  yes, SURENDRA-Improvement?  yes and Snoring-Improvement?  yes    OBJECTIVE     Vitals: /80   Pulse 94   Resp 16   Ht 5' 5.7\" (1.669 m)   Wt 266 lb (120.7 kg)   LMP 03/15/2024 (Approximate)   BMI 43.33 kg/m²     Initial weight loss: -07   Total weight loss: +12   Start weight: 254    Wt Readings from Last 3 Encounters:   25 266 lb (120.7 kg)   25 269 lb (122 kg)   24 273 lb (123.8 kg)       Patient Medications:    Current Outpatient Medications   Medication Sig Dispense Refill    CUSTOM MEDICATION Semaglutide/ cyanocobalamin    0.5 mg-   Concentration: 1/0.5 mg/ML   Amount: 2.5 ML vial  Instructions: Inject 50 units/ 0.5 ML q weekly 1 each 5    Dexmethylphenidate HCl ER 25 MG Oral Capsule SR 24 Hr Take 1 capsule (25 mg total) by mouth every morning.      Nystatin [id number] UNIT/GM External Powder Apply 1 Application topically 4 (four) times daily. 60 g 0    ALPRAZolam 0.5 MG  Oral Tab Take 1 tablet (0.5 mg total) by mouth.      VRAYLAR 3 MG Oral Cap 3 mg.      sertraline 100 MG Oral Tab Take 1.5 tablets (150 mg total) by mouth daily.      ondansetron (ZOFRAN) 4 mg tablet Take 1 tablet (4 mg total) by mouth every 8 (eight) hours as needed for Nausea. 20 tablet 2    traZODone HCl 100 MG Oral Tab Take 2 tablets (200 mg total) by mouth. At Bedtime  1    Multiple Vitamin (MULTIVITAMINS) Oral Cap Take 1 capsule by mouth daily.       Allergies:  Bactrim [sulfamethoxazole w/trimethoprim] and Latex     Social History:    Social History     Socioeconomic History    Marital status:      Spouse name: Not on file    Number of children: Not on file    Years of education: Not on file    Highest education level: Not on file   Occupational History    Not on file   Tobacco Use    Smoking status: Former     Current packs/day: 0.00     Average packs/day: 0.5 packs/day for 10.0 years (5.0 ttl pk-yrs)     Types: Cigarettes     Start date: 10/24/1991     Quit date: 10/24/2001     Years since quittin.3    Smokeless tobacco: Never   Vaping Use    Vaping status: Never Used   Substance and Sexual Activity    Alcohol use: Yes     Comment: socially    Drug use: No     Comment: never    Sexual activity: Not on file   Other Topics Concern     Service Not Asked    Blood Transfusions Not Asked    Caffeine Concern Yes     Comment: 1-2 cups of coffee    Occupational Exposure Not Asked    Hobby Hazards Not Asked    Sleep Concern Not Asked    Stress Concern Not Asked    Weight Concern Not Asked    Special Diet Not Asked    Back Care Not Asked    Exercise Not Asked    Bike Helmet Not Asked    Seat Belt Not Asked    Self-Exams Not Asked   Social History Narrative    Not on file     Social Drivers of Health     Food Insecurity: Not on file   Transportation Needs: Not on file   Stress: Not on file   Housing Stability: Not on file     Surgical History:    Past Surgical History:   Procedure Laterality Date     Benign biopsy left      Benign biopsy right            Colonoscopy  2022    Colonoscopy N/A 2022    Procedure: COLONOSCOPY;  Surgeon: Kt Bailey MD;  Location: [facility] ENDOSCOPY    Lumpectomy left Left 10/29/2018    Tiffany biopsy stereo nodule 2 site bilat (cpt=[id number])      negative    Needle biopsy left  10/08/2018    Needle biopsy right  10/08/2018    Radiation left  2019    Sinus surgery        Tonsillectomy       Family History:    Family History   Problem Relation Age of Onset    Breast Cancer Self     Other (Other) Mother         cushings syndrome    Heart Disorder Father     Diabetes Father     Hypertension Father     Breast Cancer Sister 47    Breast Cancer Sister 51    Cancer Maternal Grandfather 70        prostate/bladder ca    Breast Cancer Paternal Grandmother 43        d. 70    Cancer Maternal Uncle 80        unknown primary       Food Journal  Reviewed and Discussed:       Patient has a Food Journal?: yes   Patient is reading nutrition labels?  yes  Average Caloric Intake:     Average CHO Intake: 130  Is patient exercising? no  Type of exercise?     Eating Habits  Patient states the following:  Eats 3 meal(s) per day  Length of time it takes to consume a meal:  20  # of snacks per day: 1 Type of snacks:  Tortilla chips, fruit  Amount of soda consumption per day:  La croix  Amount of water (in ounces) per day:  48  Drinking between meals only:  yes  Toughest challenge:  exercise    Nutritional Goals  Limit carbohydrates to 100 gms per day, Eat 100-200 calories within 1 hour of waking  and Eat 3-4 cups of fresh fruits or vegetables daily    Behavior Modifications Reviewed and Discussed  Eat breakfast, Eat 3 meals per day, Plan meals in advance, Read nutrition labels, Drink 64 oz of water per day, Maintain a daily food journal, No drinking 30 minutes before or after meals, Utlize portion control strategies to reduce calorie intake, Identify triggers for eating and manage  cues and Eat slowly and take 20 to 30 minutes to complete each meal    Exercise Goals Reviewed and Discussed    Needs to start walking    ROS:    Constitutional: positive for fatigue  Respiratory: positive for dyspnea on exertion  Cardiovascular: negative  Gastrointestinal: positive for reflux symptoms  Musculoskeletal:positive for arthralgias and back pain  Neurological: negative  Behavioral/Psych: positive for anxiety  Endocrine: negative  All other systems were reviewed and are negative    Physical Exam:   General appearance: alert, appears stated age and cooperative  Head: Normocephalic, without obvious abnormality, atraumatic  Eyes: conjunctivae/corneas clear. PERRL, EOM's intact. Fundi benign.  Back: symmetric, no curvature. ROM normal. No CVA tenderness.  Lungs: clear to auscultation bilaterally  Heart: S1, S2 normal, no murmur, click, rub or gallop, regular rate and rhythm  Abdomen: soft, non-tender; bowel sounds normal; no masses,  no organomegaly  Extremities: extremities normal, atraumatic, no cyanosis or edema  Pulses: 2+ and symmetric  Skin: Skin color, texture, turgor normal. No rashes or lesions    ASSESSMENT       Encounter Diagnosis(ses):   Encounter Diagnoses   Name Primary?    Hypercholesterolemia with hypertriglyceridemia Yes    Encounter for therapeutic drug monitoring     Attention deficit disorder (ADD) without hyperactivity     Morbid obesity with BMI of 40.0-44.9, adult (HCC)          PLAN     Patient is not interested in bariatric surgery. Patient desires to pursue traditional weight loss at this time.        Goals for next month:  1. Keep a food log.  2. Drink 48-64 ounces of non-caloric beverages per day. No fruit juices or regular soda.  3. Increase activity-upper body exercises, walk 10 minutes per day.  4. Increase fruit and vegetable servings to 5-6 per day.        GLP not covered    Compound semaglutide   Increase to 1 mg weekly    Needs to ambulate    PHENTERMINE: Since the patient  would like to try phentermine, and is aware of the potential side effects (hypertension, palpitations, tachycardia, and anxiety), I will give Chelsey Barroso a prescription today to be used in conjunction with the above diet and exercise program. The patient will check her heart rate and blood pressure on a regular basis. She will call me if her BP goes over 140/90 or if she has palpitations or racing heart rate. She understands that I will not call in the prescription for her; she has to have an appointment to have the medication refilled.   EKG needed  Will start at 15 mg    Diagnoses and all orders for this visit:    Hypercholesterolemia with hypertriglyceridemia    Encounter for therapeutic drug monitoring    Attention deficit disorder (ADD) without hyperactivity    Morbid obesity with BMI of 40.0-44.9, adult (HCC)            James Ponce MD

## 2025-03-22 ENCOUNTER — HOSPITAL ENCOUNTER (OUTPATIENT)
Dept: MRI IMAGING | Facility: HOSPITAL | Age: 55
Discharge: HOME | End: 2025-03-22
Attending: INTERNAL MEDICINE
Payer: COMMERCIAL

## 2025-03-22 DIAGNOSIS — Z12.39 BREAST CANCER SCREENING OTHER THAN MAMMOGRAM: ICD-10-CM

## 2025-03-22 DIAGNOSIS — Z12.39 BREAST CANCER SCREENING, HIGH RISK PATIENT: ICD-10-CM

## 2025-03-22 PROCEDURE — 77049 MRI BREAST C-+ W/CAD BI: CPT | Performed by: INTERNAL MEDICINE

## 2025-03-22 PROCEDURE — A9575 INJ GADOTERATE MEGLUMI 0.1ML: HCPCS | Performed by: INTERNAL MEDICINE

## 2025-03-22 RX ORDER — GADOTERATE MEGLUMINE 376.9 MG/ML
20 INJECTION INTRAVENOUS
Status: COMPLETED | OUTPATIENT
Start: 2025-03-22 | End: 2025-03-22

## 2025-03-22 RX ADMIN — GADOTERATE MEGLUMINE 20 ML: 376.9 INJECTION INTRAVENOUS at 14:36:00

## 2025-03-24 ENCOUNTER — TELEPHONE (OUTPATIENT)
Age: 55
End: 2025-03-24

## 2025-03-24 DIAGNOSIS — R92.8 ABNORMAL MRI, BREAST: Primary | ICD-10-CM

## 2025-03-24 DIAGNOSIS — C50.412 MALIGNANT NEOPLASM OF UPPER-OUTER QUADRANT OF LEFT BREAST IN FEMALE, ESTROGEN RECEPTOR POSITIVE (HCC): ICD-10-CM

## 2025-03-24 DIAGNOSIS — Z17.0 MALIGNANT NEOPLASM OF UPPER-OUTER QUADRANT OF LEFT BREAST IN FEMALE, ESTROGEN RECEPTOR POSITIVE (HCC): ICD-10-CM

## 2025-03-24 NOTE — TELEPHONE ENCOUNTER
Patient called and notified that Radiology is recommending a right breast biopsy. Informed Mammography Department will be calling to schedule. Patient verbalizes understanding.

## 2025-03-24 NOTE — IMAGING NOTE
Discussed recommended breast biopsy with patient.  Patient is being recommended for MRI biopsy of the  right  Breast  x2  by Dr. Chatterjee.  Spouses name is Rick. Pt has 2 dtrs ages 23 20.  Pt works for a bank.  Pt was provided Wed 3/26/24 checking in at [address] [facility] [address].   HX LATEX ALLERGY   Pt history discussed as below:  Pt history of biopsy: Yes 2018 oct        Family history of cancer: sister breast ca dx late 40's early 50's another sister late 40's pat grandma breast ca dx?40's   Pt history of breast cancer: left breast ca dx 2018 tx lumpectomy  20 rounds rad tx genetic testing neg brca   Hx BCP use:     no               HRT use: no ivf no     RECOMMENDATIONS:   MRI-GUIDED BREAST BIOPSY: RIGHT BREAST x2         Recommedations :                      see [facility] for dictated radiology report   Reviewed pertinent patient history, family history of cancer, and patient medications  Discussed with patient Radiology’s protocol regarding medications, as well as over-the-counter vitamin or herbal supplements, as follows:  -All herbal supplements, Vitamin E, Fish Oil    -All NSAIDs (Ibuprofen, Motrin, Advil, Aleve, or other antiinflammatory medication)  and Aspirin  81mg currently being taken    not  recommended or prescribed by  your physician  should be held for 5  days prior to biopsy.  Denies usage only tylenol   -Aspirin 81 mg being taken related to a cardiac condition  or prescribed by your  physician should be held at the  direction of your physician.  Informed patient to call ordering physician for guidelines  denies usage    -Blood thinners/antiplatelet medications (Coumadin, Plavix  ect) should be held at the  direction of your physician.  Informed patient to call ordering physician for guidelines  denies usage     Reviewed MRI (MAGNETIC RESONANCE IMAGING) biopsy procedure, as below.  For this procedure, you will be lying on your stomach with your breast in compression .    AN IV WILL BE STARTED WHEN YOU ARRIVE AND  YOUR KIDNEY FUNCTION WILL BE CHECK IF NOT DONE PREVIOUSLY. ENCOURAGED PATIENT TO KEEP WELL HYDRATED 2-3 DAYS BEFORE THE BIOPSY TO AID WITH  PROMOTING ADEQUATE KIDNEY FUNCTION WHILE RECEIVING CONTRAST. You will receive contrast to assist locating the area to be biopsied.  Mri  imaging will be   reviewed and compared to previous mri.   Once site is confirmed the Radiologist will then inject a local numbing medication into the area and then use a needle to collect cells from the site.  A marker, or clip, will then be placed in the biopsied area.  This marker is placed so this biopsy site is able to be accurately located upon future breast imaging.  After the clip is placed, the RN will place a dressing on the biopsy site.  Additional mammography films will then be taken to assure correct placement of the marker.    Educated the patient they will be awake during this procedure and are able to drive themselves home if they wish.    Educated patient that some soreness may occur after biopsy.  Discussed use of a supportive bra and ice packs after procedure, to decrease soreness.    Discussed with patient no swimming, bathing,  hot tubs , or submerging underwater for 5 days and   until the incision is closed and healed.  Educated patient on lifting restrictions - nothing heavier than a gallon of milk for 24-48 hours after the procedure.      Discussed with patient that some soreness and bruising is normal after biopsy but that prolonged or increased pain and bruising should be reported to the ordering physician.  An ace wrap will be applied over bra for added support and should be left on for 24 hours post procedure.  Reviewed results process with patient and shared that pathology results will be available within 2-3 business days of their biopsy.  Discussed results will be communicated by their ordering physician unless otherwise indicated.  Educated patient that once we receive an order from their physician our radiology  secretaries will be calling them to schedule their biopsy. iscussed recommended breast biopsy with patient.

## 2025-03-25 NOTE — DISCHARGE INSTRUCTIONS
The Doctor (Radiologist) who performed your procedure was: DR FRANCO    Place an ice pack over the biopsy site on top of your bra or on top of the ACE wrap (never apply ice directly over skin) for 10-15 minutes of every hour until bedtime for your comfort and to decrease bleeding.  Keep your sports bra or the ACE wrap (stereotactic and MRI biopsy) in place for 24 hours after your biopsy. This compression decreases bleeding and breast movement for your comfort. Wear a supportive bra for the next couple of days for comfort (sports bra for sleep).   Continue to wear, preferably, a sports bra or good supportive bra for 1 week and take off only to shower.  No baths or showers during the first 24 hours after biopsy. After this time you may take a shower. It's okay if the strips get wet but do not soak them. NO saunas, hot tubs or swimming until steri-strips fall off (approx. 5 days). This prevents infection and allows time for them to completely close and heal.  DO NOT remove the steri-strips. They will fall off in 5 days. If any type of irritation (redness, itching or blisters) develops in the area around the steri-strips, remove them gently. If the steri-strips do not fall off after 5 days, gently remove them. Keep the area clean and dry.  It is normal to have mild discomfort and bruising at the biopsy site.  You may take Tylenol as needed for discomfort, as long as you have no allergies to Tylenol. Do not take aspirin, motrin, ibuprofen or any medication containing NSAID (non-steroidal anti-inflammatory drug) product for 48 hours.  DO NOT participate in strenuous activity (aerobics, heavy lifting, housework, gardening, etc.) 48 hours after your biopsy to prevent bleeding.  You will receive results in 2-3 business days.  If you are having an MRI breast biopsy or an Ultrasound guided breast biopsy, you will be billed for the biopsy and unilateral mammogram separately.  If you have any questions about the procedure or your  results, please contact the Breast Care Coordinator Nurse at [phone].  Notify your ordering physician or primary physician for increased bleeding, pain or fever over 100. Or contact a Radiology Nurse at [phone] between 8am-4pm (after 4pm, your call will be directed to the [facility]).

## 2025-03-26 ENCOUNTER — HOSPITAL ENCOUNTER (OUTPATIENT)
Dept: MAMMOGRAPHY | Facility: HOSPITAL | Age: 55
Discharge: HOME | End: 2025-03-26
Attending: INTERNAL MEDICINE
Payer: COMMERCIAL

## 2025-03-26 ENCOUNTER — HOSPITAL ENCOUNTER (OUTPATIENT)
Dept: MRI IMAGING | Facility: HOSPITAL | Age: 55
Discharge: HOME | End: 2025-03-26
Attending: INTERNAL MEDICINE
Payer: COMMERCIAL

## 2025-03-26 DIAGNOSIS — Z17.0 MALIGNANT NEOPLASM OF UPPER-OUTER QUADRANT OF LEFT BREAST IN FEMALE, ESTROGEN RECEPTOR POSITIVE (HCC): ICD-10-CM

## 2025-03-26 DIAGNOSIS — R92.8 ABNORMAL MRI, BREAST: ICD-10-CM

## 2025-03-26 DIAGNOSIS — C50.412 MALIGNANT NEOPLASM OF UPPER-OUTER QUADRANT OF LEFT BREAST IN FEMALE, ESTROGEN RECEPTOR POSITIVE (HCC): ICD-10-CM

## 2025-03-26 PROCEDURE — 77065 DX MAMMO INCL CAD UNI: CPT | Performed by: INTERNAL MEDICINE

## 2025-03-26 PROCEDURE — 19085 BX BREAST 1ST LESION MR IMAG: CPT | Performed by: INTERNAL MEDICINE

## 2025-03-26 PROCEDURE — 19086 BX BREAST ADD LESION MR IMAG: CPT | Performed by: INTERNAL MEDICINE

## 2025-03-26 PROCEDURE — A9575 INJ GADOTERATE MEGLUMI 0.1ML: HCPCS | Performed by: INTERNAL MEDICINE

## 2025-03-26 PROCEDURE — 88341 IMHCHEM/IMCYTCHM EA ADD ANTB: CPT | Performed by: INTERNAL MEDICINE

## 2025-03-26 PROCEDURE — 88305 TISSUE EXAM BY PATHOLOGIST: CPT | Performed by: INTERNAL MEDICINE

## 2025-03-26 PROCEDURE — 88342 IMHCHEM/IMCYTCHM 1ST ANTB: CPT | Performed by: INTERNAL MEDICINE

## 2025-03-26 RX ORDER — GADOTERATE MEGLUMINE 376.9 MG/ML
20 INJECTION INTRAVENOUS
Status: COMPLETED | OUTPATIENT
Start: 2025-03-26 | End: 2025-03-26

## 2025-03-26 RX ADMIN — GADOTERATE MEGLUMINE 20 ML: 376.9 INJECTION INTRAVENOUS at 12:44:00

## 2025-03-26 NOTE — IMAGING NOTE
To Radiology holding area hx as follow:   Impression   CONCLUSION:       2 site right breast MRI guided biopsy recommended for assessment of the span of suspicious central to upper central 8.3 cm non mass enhancement (series [id number], image 69 and series 27, image 226.) Targeting can be performed of the upper anterior and lower  posterior portion of the enhancement to establish extent of process.       1133 Post instructions given verbal et written. Pt verbalizes understanidng and agreement.     IV started by MRI staff    1140   Dr FRANCO  here procedure explained questions answered .    Consent verified and obtained. Site marked RIGHT  Breast 2 SITES     1141  Time out taken    1151 Patient to mri table  scans taken.      ANTERIOR RIGHT BREAST     1206 Betadine as skin prep.    1207 Lidocaine 1% 10 milligrams per ml 5 ml given.    1208 Lidocaine 1% with epinephrine 1:100,000 units 200 milligrams per 20 ml  Total amount given 15 ml.    1209  9 Gauge needle placed     1224  Sampling begins     1225  Sampling completed.    1226 Formalin added to samples    HOURGLASS  Breast clip placed at 1229.        POSTERIOR  RIGHT BREAST     1205 Betadine as skin prep.    1210 Lidocaine 1% 10 milligrams per ml 5 ml given.    1211  Lidocaine 1% with epinephrine 1:100,000 units 200 milligrams per 20 ml  Total amount given 15 ml.    1212  9 Gauge needle placed     1220  Sampling begins     1221 Sampling completed.    1222  Formalin added to samples    STOPLIGHT  Breast clip placed at 1229. Procedure complete pressure to site 10 minutes. Area cleaned steri strips to site. Ice pack to site.Iv removed by MRI staff.      1300  To mammography department for post clip images. Bb nipple marker to be removed from nipple by mammo staff. Bra to be applied by patient. A 6 \" ace wrap secured over Bra. Then mammo department to discharge patient.    Cores taken to pathology by ARGENTINA PORTER.

## 2025-03-28 ENCOUNTER — TELEPHONE (OUTPATIENT)
Age: 55
End: 2025-03-28

## 2025-03-28 NOTE — TELEPHONE ENCOUNTER
NN phoned patient to review recent biopsy results. NN informed patient that the next step will be to meet with breast surgeon. Patient will be contacted by surgical team to schedule consultation.     Patient appreciative of the call and encouraged to reach out with questions or concerns.

## 2025-04-16 ENCOUNTER — OFFICE VISIT (OUTPATIENT)
Facility: CLINIC | Age: 55
End: 2025-04-16
Payer: COMMERCIAL

## 2025-04-16 VITALS
BODY MASS INDEX: 41.78 KG/M2 | SYSTOLIC BLOOD PRESSURE: 137 MMHG | RESPIRATION RATE: 16 BRPM | OXYGEN SATURATION: 97 % | DIASTOLIC BLOOD PRESSURE: 88 MMHG | HEART RATE: 88 BPM | TEMPERATURE: 97 F | HEIGHT: 66 IN | WEIGHT: 260 LBS

## 2025-04-16 DIAGNOSIS — C50.412 MALIGNANT NEOPLASM OF UPPER-OUTER QUADRANT OF LEFT BREAST IN FEMALE, ESTROGEN RECEPTOR POSITIVE (HCC): ICD-10-CM

## 2025-04-16 DIAGNOSIS — Z17.0 MALIGNANT NEOPLASM OF UPPER-OUTER QUADRANT OF LEFT BREAST IN FEMALE, ESTROGEN RECEPTOR POSITIVE (HCC): ICD-10-CM

## 2025-04-16 DIAGNOSIS — N60.99 ATYPICAL DUCTAL HYPERPLASIA OF BREAST: Primary | ICD-10-CM

## 2025-04-16 PROCEDURE — 99215 OFFICE O/P EST HI 40 MIN: CPT | Performed by: SURGERY

## 2025-04-16 PROCEDURE — 3008F BODY MASS INDEX DOCD: CPT | Performed by: SURGERY

## 2025-04-16 PROCEDURE — 3075F SYST BP GE 130 - 139MM HG: CPT | Performed by: SURGERY

## 2025-04-16 PROCEDURE — 3079F DIAST BP 80-89 MM HG: CPT | Performed by: SURGERY

## 2025-04-16 NOTE — PATIENT INSTRUCTIONS
Dr. Ailyn Williamson  Tel: [phone]  Fax: [phone] [facility]  [address], [place] [address]  [phone]       Surgery/Procedure: Right breast wire bracketed excisional biopsy      Anesthesia:   MAC  Surgery Length:   45 minutes CPT:  56350, 14485   Wire LOC:   Yes Nuc Med:   No   Susan Seed:  No       Dx & ICD-10: Atypical ductal hyperplasia of breast (N60.99)   Radiology Instructions: 1st site- Right breast, posterior position ,hourglass shaped clip , biopsy confirms pseudoangiomatous stromal hyperplasia.   2nd site- Right breast, anterior position , stoplight shaped clip , biopsy confirms atypical ductal hyperplasia.   _______________________________________________________________________________    Someone must accompany you the day of the procedure to drive you home safely, because of anesthesia.  You will need an adult  to stay with you the first night following your surgery.  You must remove any kind of makeup, acrylic nails, lotions, powders, creams or deodorant.  [facility] ONLY: Pre-admission will give instruct you on when to take Gatorade and Tylenol/acetaminophen prior to your surgery, purchase 2 - 12oz bottles of regular Gatorade (NOT RED/SUGAR FREE). Otherwise, you may not eat or drink anything else after 11PM the night before surgery.    [facility]: You may not eat or drink anything after midnight the day of your surgery.   Wear comfortable clothing that can be easily removed.  If you wear dentures, contacts lenses, or any prosthesis, you will be asked to remove them.  Do not drink alcohol or smoke 24 hours prior to your procedure.  Bring a picture ID and your insurance card.  Covid-19 testing is no longer required before surgery unless you are experiencing symptoms such as fever, cough, congestion, etc.   The Pre-Admission Testing Department will call the day before to confirm your procedure, give you the time you need to arrive by and directions on where to go.  They begin making calls after 2pm, if you are not contacted by 4pm, please call the surgeon's office listed above.  Do not take any blood thinners at least one week prior to the procedure/surgery. This includes aspirin, baby aspirin, Ibuprofen products, herbal supplements, diet medications, vitamin E, fish oil and green tea supplements. Please check other supplements for these ingredients. *TYLENOL or acetaminophen is acceptable*  If you take Coumadin, Plavix, Xarelto, or Eliquis, please contact your prescribing physician for special instructions on how long to hold. If you take insulin contact your primary care physician for special instructions.  Our surgery scheduler, Lauren, will be contacting you to discuss surgery dates. If you have any questions related to scheduling your surgery, please reach out to her at [phone].  _____________________________________________________________________  PRE-OPERATIVE TESTING IF INDICATED BELOW  PLEASE COMPLETE ASAP (AT LEAST 14-21 DAYS PRIOR TO SURGERY)  [] CBC [] BMP [] CMP [] EKG    [] PT, PTT, INR [] Cardiac Clearance  [] H&P Medical Clearance [] Chest X-ray     Please call Central Scheduling to schedule an appointment for pre-operative labs/tests once your orders are placed @ [phone]  Does the patient have a pacemaker or ICD?                              Faxitron/Trident in OR?  [] Yes   [x] No                               [] Yes   [x] No

## 2025-04-17 ENCOUNTER — TELEPHONE (OUTPATIENT)
Dept: SURGERY | Facility: CLINIC | Age: 55
End: 2025-04-17

## 2025-04-17 DIAGNOSIS — N60.99 ATYPICAL DUCTAL HYPERPLASIA OF BREAST: Primary | ICD-10-CM

## 2025-04-17 NOTE — TELEPHONE ENCOUNTER
Calling pt in regards to scheduling surgery.  Informed pt that I have 06/16/2025 available at [facility] with Dr. Williamson.  Pt verbalized understanding and in agreement with date and location.  All questions answered.   Encouraged pt to call or [place] message office with any other questions or concerns.

## 2025-05-01 NOTE — PROGRESS NOTES
Breast Surgery Surveillance Visit    Diagnosis: Left breast cancer status post lumpectomy on October 29, 2018 and status post sentinel lymph node biopsy in November 12, 2018.    Stage:  Cancer Staging   Malignant neoplasm of upper-outer quadrant of left breast in female, estrogen receptor positive (HCC)  Staging form: Breast, AJCC 8th Edition  - Pathologic stage from 11/7/2018: Stage IA (pT1a, pN0, cM0, G2, ER: Positive, ME: Positive, HER2: Negative) - Signed by Deacon Mclean MD on 2/6/2019    Disease Status:  Surgical treatment complete, RT completed, tamoxifen ongoing.    History:   This 54 year old woman presented  in June 2017 with multiple concerns related to her Left breast. She reports that several years ago, she developed sudden onset of Left breast swelling. This was associated with pain at the time and to date she continues to have tenderness focally in this area. The patient reports that this was worked up with imaging at [facility] in 2013 and she was told that she has \"extra breast tissue in that area.\" As a result of her workup at [facility], she underwent a stereotactic breat biopsy in March 2014 to clarify the focal asymmetric tissue in the Left breast at the site of her clinical concerns. This was consistent with benign breast tissue. She does report that her symptoms are worse during her menstrual cycle but that they do occasionally occur daily. She denies any nipple discharge or skin changes. She does report that her tender symptoms do occasionally radiate into the Left axilla. She does have family history of breast cancer as detailed below.     Bilateral Diagnostic Mammogram on October 15, 2014 performed at [facility] which showed scattered fibroglandular densities (25-50% glandular) with no suspicious masses or calcifications and stable Left UOQ asymmetry with visible tissue marker in this area consistent with her OSH biopsy site. Left Breast US was performed the same day in light of the  patient's pain symptoms and was consistent with dense breast tissue corresponding to the asymmetry seen on mammogram with a visible biopsy tract for which a 3 month surveillance was recommended to document stability. She returned on February 6, 2015 for the Left breast surveillance US which confirmed that the area related to her biopsy which was smaller compared to prior and thought to be representative of post-biopsy changes.     Since that time, she has been followed closely for high risk surveillance in light of the family history of breast cancer.  Presently she has no new clinical concerns today.  She had a mammogram performed on June 30, 2016 which was reported as benign.  She did undergo MRI on January 10, 2017 for high risk screening protocol which demonstrated what appeared to be a sebaceous cyst in the right axillary area as well as a 1.2 cm mass at the 9 o'clock position of the right breast for which a second look ultrasound was recommended with no MRI evidence of malignancy in the left breast.  The targeted right breast ultrasound took place on January 23, 2017 and confirmed fatty replaced lymph nodes in the axilla with a 1.1 cm normal-appearing intramammary lymph node at the site of concern at 9:00 on the right breast with no other suspicious findings.  Surveillance bilateral 3D screening mammogram took place on September 23, 2017 and confirmed no new changes bilaterally.  On September 18, 2018 she underwent an MRI of the breast for high risk surveillance that showed multiple concerns bilaterally.  On October 3, 2018 repeated diagnostic mammogram and ultrasound was performed that showed no explanation for focal areas of enhancement that had been seen on the prior MRI bilaterally for which a bilateral MRI guided biopsy was recommended.  The bilateral breast MRI guided biopsy took place on October 8, 2018 and in the right breast she was found to have a benign fibroadenomatous change.  The left breast  demonstrated a 4 mm focus of ductal carcinoma in situ that was ER/IA positive.  Of note, the patient did undergo a comprehensive genetic evaluation in May 2015 that was negative.  She elected for breast conserving therapy which took place without complication however she was noted to have a small component of invasive disease and elected to return to the OR for a sentinel lymph node biopsy for purposes of staging with ultimately negative lymph node biopsy results.  Since then, she denies any new concerns related to bilateral breast.  She completed her surveillance imaging under the direction of Dr. Mclean who is managing her tamoxifen.  She is tolerating this with no significant side effects.  She does report a inpatient admission for depression since her last visit.  She is here today for evaluation and recommendations for further therapy.         Past Medical History:    Anxiety state, unspecified    Attention deficit disorder    Breast CA (HCC)    Breast mass, left    has had mammo, ultrasound, biopsy    Cancer (HCC)    left breast    Colon adenoma    x1    Depression    High cholesterol    Hypercholesterolemia with hypertriglyceridemia    Morbid obesity with BMI of 40.0-44.9, adult (HCC)    Vitamin D deficiency     Past Surgical History:   Procedure Laterality Date    Benign biopsy left      Benign biopsy right             delivery only N/A     ,     Colonoscopy  2022    Colonoscopy N/A 2022    Procedure: COLONOSCOPY;  Surgeon: Kt Bailey MD;  Location: [facility] ENDOSCOPY    Lumpectomy left Left 10/29/2018    Tiffany biopsy stereo nodule 2 site bilat (cpt=19081/64291)      negative    Needle biopsy left  10/08/2018    Needle biopsy right  10/08/2018    Radiation left  2019    Sinus surgery        Tonsillectomy     Left Breast Stereotactic biopsy in 3/2014-Benign    Gynecological History:  Pt is a   Pt was 31 years old at time of first pregnancy.     She has a  cumulative breastfeeding history of 4 months, last many years ago.  She achieved menarche at age 13 and LMP     She has a history of oral contraceptive use for 20 years, last in 2011.  She denies infertility treatment to achieve pregnancy.    Medications:     Current Outpatient Medications on File Prior to Visit   Medication Sig Dispense Refill    CUSTOM MEDICATION Semaglutide/ cyanocobalamin    1 mg -   Concentration: 5 mg/ 0.5 mL   Amount: 1 ML vial   Instructions: Injection 0.2 mL/ 20 units sq weekly 1 each 5    Phentermine HCl 15 MG Oral Cap Take 1 capsule (15 mg total) by mouth every morning. 30 capsule 5    Dexmethylphenidate HCl ER 25 MG Oral Capsule SR 24 Hr Take 1 capsule (25 mg total) by mouth every morning.      Nystatin 344151 UNIT/GM External Powder Apply 1 Application topically 4 (four) times daily. 60 g 0    ALPRAZolam 0.5 MG Oral Tab Take 1 tablet (0.5 mg total) by mouth.      VRAYLAR 3 MG Oral Cap 3 mg.      sertraline 100 MG Oral Tab Take 1.5 tablets (150 mg total) by mouth in the morning.      ondansetron (ZOFRAN) 4 mg tablet Take 1 tablet (4 mg total) by mouth every 8 (eight) hours as needed for Nausea. 20 tablet 2    traZODone HCl 100 MG Oral Tab Take 2 tablets (200 mg total) by mouth. At Bedtime  1    Multiple Vitamin (MULTIVITAMINS) Oral Cap Take 1 capsule by mouth in the morning.       No current facility-administered medications on file prior to visit.       Allergies:     Allergies   Allergen Reactions    Bactrim [Sulfamethoxazole W/Trimethoprim] HIVES    Latex HIVES and RASH       Family History    Problem  Relation  Age of Onset      Heart Disorder  Father        Diabetes  Father        Hypertension  Father        Breast Cancer  Paternal Grandmother  43        d. 70      Breast Cancer  Sister  47      Cancer  Maternal Uncle  80        unknown primary      Cancer  Maternal Grandfather  70        prostate/bladder ca    She is not of Ashkenazi [employer] ancestry.    Social History:    Alcohol  Use:  Yes    Comment:  socially        Smoking status:  Former Smoker  0.50 Packs/Day  For 10.00 Years    Types:  Cigarettes    Smokeless tobacco:  Never Used    She is currently unemployed. She moved to [facility] from [place] in July 2013 with her 2 daughters. She is  and was living in [place] previously.     Review of Systems:  General:    The patient denies, fever, chills, night sweats, fatigue, generalized weakness, change in appetite or weight loss.    HEENT:      The patient denies eye irritation, cataracts, redness, glaucoma, yellowing of the eyes, change in vision or color blindness. The patient denies hearing loss, ringing in the ears, ear drainage, earaches, nasal congestion, nose bleeds, snoring, pain in mouth/throat, hoarseness, change in voice, facial trauma.    Respiratory:  The patient denies chronic cough, phlegm, hemoptysis, pleurisy/chest pain, pneumonia, asthma, wheezing, difficulty in breathing with exertion, emphysema, chronic bronchitis, shortness of breast or abnormal sound when breathing.     Cardiovascular:  There is no history of chest pain, chest pressure/discomfort, palpitations, irregular heartbeat, fainting or near-fainting, difficulty breathing when lying flat, SOB/Coughing at night, swelling of the legs or chest pain while walking.    Breasts:  See history of present illness    Gastrointestinal:      There is no history of difficulty or pain with swallowing, reflux symptoms, vomiting, dark or bloody stools, constipation, yellowing of the skin, indigestion, nausea, change in bowel habits, diarrhea, abdominal pain or vomiting blood.     Genitourinary:  The patient denies frequent urination, needing to get up at night to urinate, urinary hesitancy or retaining urine, painful urination, urinary incontinence, decreased urine stream, blood in the urine or vaginal/penile discharge.    Skin:     The patient denies rash, itching, skin lesions, dry skin, change in skin color  or change in moles.     Hematologic/Lymphatic:  The patient denies easily bruising or bleeding or persistent swollen glands or lymph nodes.     Musculoskeletal:  The patient denies muscle aches/pain, joint pain, stiff joints, neck pain, back pain or bone pain.    Neuropsychiatric:  There is no history of migraines or severe headaches, seizure/epilepsy, speech problems, coordination problems, trembling/tremors, fainting/black outs, dizziness, memory problems, loss of sensation/numbness, problems walking, weakness, tingling or burning in hands/feet. There is no history of abusive relationship, bipolar disorder, sleep disturbance, depression or feeling of despair. +anxiety    Endocrine:     There is no history of poor/slow wound healing, weight loss/gain, fertility or hormone problems, cold intolerance, thyroid disease.     Allergic/Immunologic:  There is no history of hives, hay fever, angioedema or anaphylaxis.    /88 (BP Location: Left arm, Patient Position: Sitting, Cuff Size: large)   Pulse 88   Temp 97 °F (36.1 °C) (Temporal)   Resp 16   Ht 1.676 m (5' 6\")   Wt 117.9 kg (260 lb)   LMP 03/15/2024 (Approximate)   SpO2 97%   BMI 41.97 kg/m²     Physical Examination:   This is a well-nourished, alert and oriented woman. There is not palpable cervical, supraclavicular or axillary adenopathy.  The neck is supple with a midline trachea and no thyromegaly. Range of motion is good at both shoulders. Breasts are symmetrical. The tumorectomy site is in the upper outer left  breast and shows no evidence of local recurrence. Both nipples are everted with no discharge. There is not dominant masses, focal nodularity or skin changes on either side. The abdomen is soft, flat and nontender, with no palpable masses or organomegaly.    Imaging:  Her most recent imaging was performed on April 29, 2020 and this bilateral evaluation demonstrated no suspicious findings.  She subsequently had an MRI on Michelle 10, 2020 which  was also unremarkable.    Assessment:  48 y/o F with h/o benign Left breast biopsy and an increased risk for breast cancer based on multiple risk assessment models with new diagnosis of left breast cancer status post lumpectomy.       Discussion and Plan:  I had a discussion with the patient regarding her breast exam. On exam today I found her to be healing well since her surgery with no signs of new or recurrent disease.  I personally reviewed her recent imaging which is concordant with this finding.  She will continue to follow with medical oncology for management of her tamoxifen. We will plan to get her back on track with imaging surveillance with a left diagnostic evaluation in approximately October 2020.  We will likely plan for an hour MRI again and June 2021.  I encouraged her to continue monitoring her ROM and strength and explained that a referral to physical therapy may be warranted in the future if she identifies any limitations or restrictions. She was encouraged to contact the office with any questions or concerns prior to her next appointment.    This encounter lasted ~25 minutes, more than 50% of which was dedicated to the discussion of management options.

## 2025-06-13 NOTE — DISCHARGE INSTRUCTIONS
HOME INSTRUCTIONS  Breast Surgery  Post-operative Instructions  Excisional Biopsy, Lumpectomy, Mastectomy, [place] Node Biopsy, or Axillary  Lymph Node Dissection  Ailyn Williamson MD  General Instructions  The following instructions will provide helpful information that will assist your recovery. These are designed to be general guidelines. Please remember that everyone heals and recovers differently. Listen to your body and rest when you are tired. If you have any questions or concerns, please do not hesitate to contact my office. I would like to see you in the office about one week after surgery, please schedule and appointment through my office to make a post-operative appointment if you do not already have one.     Restrictions  There are no lifting weight restrictions for the arm on the surgical side. You may gradually increase the amount of weight based on your comfort level. You should avoid a lot of repetitious activity with the arm until the drain is out (if one was placed) and the wound is well-healed (about two weeks).   You should not drive a car until you believe you can react to an emergency situation and you’re no longer taking narcotic pain medications.   You may shower the day after surgery. You should not bathe or swim (i.e. submerge wound) until the wound is well healed (about two weeks).  There are no dietary restrictions.    Exercise  You may begin arm exercises within a couple days. Do these 2 or 3 times per day, beginning with light exercise and gradually increase your range of motion and repetitions. This will help your arm regain full mobility. We will address your activity level again at your post-operative visit.   You will have pain medication prescribed before discharge. Take this as directed to relieve pain. It is important that you be comfortable so that you may continue your stretching exercises.   If you find the medication prescribed is too strong, try Tylenol  (Acetaminophen) or Ibuprofen.    Wound Care  You may remove the gauze dressing on the first or second postoperative day and then shower. You should leave the steri-strips in place; they will start to peel off about 10 days after your surgery. The stitches are all underneath the skin and will dissolve on their own. You will not need any stitches removed except if you have a drain in place.  I encourage you to shower once the outer bandage is removed, you may use soap and water directly over the steri-strips and pat dry following.  You should keep gauze dressing on the wound until the wound is completely dry and without drainage-usually 1-3 days.   If a surgical bra was placed after the surgery, I encourage you to wear it as much as possible during the week following the procedure (including during sleep). Alternatively, you may choose to wear your own bra provided it is comfortable, provides support and does not have an underwire. If the breast doesn’t move it is less painful.  If an elastic bandage was placed around your chest after the surgery you may remove it on the 1st or 2nd day after surgery. If you prefer to leave it on longer, you may.  It is normal to feel a lump in the area of the incisions for up to 6 months. This is part of the healing process. Eventually the breast will return to its normal condition.   Drain Care (if placed at time of axillary dissection or mastectomy)-This will be explained by your nurse prior to discharge.  Empty the drain and strip the tubing 2-3 times daily, more often if the plastic squeeze bottle fills up. This will prevent the tubing from clogging.   Starting at the top of the tubing next to your body firmly grasp the tubing with the index finger and thumb of one hand. With the other hand use the index finger and thumb to move the fluid down the tubing (this is called “stripping the tubing”).   Uncap the pouring spout and squeeze the contents of the plastic bottle into the  measuring cup.   Squeeze the bottle flat to create suction and replace the cap while squeezing to maintain the vacuum.   Measure and record the output and discard the fluid into the toilet. Record the output each time you empty the bottle.   Keep track of the output (drainage) and when the total is down to 30 cc’s or less over a 24-hour period we’ll remove the drain in the office. This is usually after 1-2  weeks, but can be longer in certain patients.   Drain removal takes about 30 seconds and is virtually painless. The suture is cut and the drain slides right out. You should call my office as the output approaches 30 cc’s over 24 hours so that we may schedule an office visit for drain removal.  If you have had reconstruction your plastic surgeon will remove the drain according to their protocol.    Pain Medication  You will be given a prescription for a narcotic pain medication (usually Norco) upon discharge. Many patients have very little pain and don’t want to use the narcotic. Don’t be afraid to use it if you’re uncomfortable. If you’d prefer you may substitute Tylenol or Ibuprofen (Motrin, Advil). Using an ice pack for a few minutes over the incision can also alleviate pain. If you do use the narcotic medication, use an over the counter stool softener or gentle laxative and stay well-hydrated as constipation is not uncommon with narcotics.    Pathology Report  The Pathology report is usually available 4-5 business days following the surgery. I will call you  with the results once the report is available.    Notify my office if:   Your temperature is over 101.5 F   You notice increasing swelling, redness, warmth or drainage from around the incision or drain site.    If you experience any problems please call my office and either my nurse or myself will respond. After hours, you will be forwarded to my answering service which will help you get in touch with myself or the physician covering for me.    [facility]  HOME CARE INSTRUCTIONS: POST-OP ANESTHESIA  The medication that you received for sedation or general anesthesia can last up to 24 hours. Your judgment and reflexes may be altered, even if you feel like your normal self.      We Recommend:   Do not drive any motor vehicle or bicycle   Avoid mowing the lawn, playing sports, or working with power tools/applicances (power saws, electric knives or mixers)   That you have someone stay with you on your first night home   Do not drink alcohol or take sleeping pills or tranquilizers   Do not sign legal documents within 24 hours of your procedure   If you had a nerve block for your surgery, take extra care not to put any pressure on your arm or hand for 24 hours    It is normal:  For you to have a sore throat if you had a breathing tube during surgery (while you were asleep!). The sore throat should get better within 48 hours. You can gargle with warm salt water (1/2 tsp in 4 oz warm water) or use a throat lozenge for comfort  To feel muscle aches or soreness especially in the abdomen, chest or neck. The achy feeling should go away in the next 24 hours  To feel weak, sleepy or \"wiped out\". Your should start feeling better in the next 24 hours.   To experience mild discomforts such as sore lip or tongue, headache, cramps, gas pains or a bloated feeling in your abdomen.   To experience mild back pain or soreness for a day or two if you had spinal or epidural anesthesia.   If you had laparoscopic surgery, to feel shoulder pain or discomfort on the day of surgery.   For some patients to have nausea after surgery/anesthesia    If you feel nausea or experience vomiting:   Try to move around less.   Eat less than usual or drink only liquids until the next morning   Nausea should resolve in about 24 hours    If you have a problem when you are at home:    Call your surgeons office   Discharge Instructions: After Your Surgery  You’ve just had surgery. During surgery, you were given  medicine called anesthesia to keep you relaxed and free of pain. After surgery, you may have some pain or nausea. This is common. Here are some tips for feeling better and getting well after surgery.   Going home  Your healthcare provider will show you how to take care of yourself when you go home. They'll also answer your questions. Have an adult family member or friend drive you home. For the first 24 hours after your surgery:   Don't drive or use heavy equipment.  Don't make important decisions or sign legal papers.  Take medicines as directed.  Don't drink alcohol.  Have someone stay with you, if needed. They can watch for problems and help keep you safe.  Be sure to go to all follow-up visits with your healthcare provider. And rest after your surgery for as long as your provider tells you to.   Coping with pain  If you have pain after surgery, pain medicine will help you feel better. Take it as directed, before pain becomes severe. Also, ask your healthcare provider or pharmacist about other ways to control pain. This might be with heat, ice, or relaxation. And follow any other instructions your surgeon or nurse gives you.      Stay on schedule with your medicine.     Tips for taking pain medicine  To get the best relief possible, remember these points:   Pain medicines can upset your stomach. Taking them with a little food may help.  Most pain relievers taken by mouth need at least 20 to 30 minutes to start to work.  Don't wait till your pain becomes severe before you take your medicine. Try to time your medicine so that you can take it before starting an activity. This might be before you get dressed, go for a walk, or sit down for dinner.  Constipation is a common side effect of some pain medicines. Call your healthcare provider before taking any medicines, such as laxatives or stool softeners, to help ease constipation. Also ask if you should skip any foods. Drinking lots of fluids and eating foods, such as  fruits and vegetables, that are high in fiber can also help. Remember, don't take laxatives unless your surgeon has prescribed them.  Drinking alcohol and taking pain medicine can cause dizziness and slow your breathing. It can even be deadly. Don't drink alcohol while taking pain medicine.  Pain medicine can make you react more slowly to things. Don't drive or run machinery while taking pain medicine.  Your healthcare provider may tell you to take acetaminophen to help ease your pain. Ask them how much you're supposed to take each day. Acetaminophen or other pain relievers may interact with your prescription medicines or other over-the-counter (OTC) medicines. Some prescription medicines have acetaminophen and other ingredients in them. Using both prescription and OTC acetaminophen for pain can cause you to accidentally overdose. Read the labels on your OTC medicines with care. This will help you to clearly know the list of ingredients, how much to take, and any warnings. It may also help you not take too much acetaminophen. If you have questions or don't understand the information, ask your pharmacist or healthcare provider to explain it to you before you take the OTC medicine.   Managing nausea  Some people have an upset stomach (nausea) after surgery. This is often because of anesthesia, pain, or pain medicine, less movement of food in the stomach, or the stress of surgery. These tips will help you handle nausea and eat healthy foods as you get better. If you were on a special food plan before surgery, ask your healthcare provider if you should follow it while you get better. Check with your provider on how your eating should progress. It may depend on the surgery you had. These general tips may help:   Don't push yourself to eat. Your body will tell you when to eat and how much.  Start off with clear liquids and soup. They're easier to digest.  Next try semi-solid foods as you feel ready. These include mashed  potatoes, applesauce, and gelatin.  Slowly move to solid foods. Don’t eat fatty, rich, or spicy foods at first.  Don't force yourself to have 3 large meals a day. Instead eat smaller amounts more often.  Take pain medicines with a small amount of solid food, such as crackers or toast. This helps prevent nausea.  When to call your healthcare provider  Call your healthcare provider right away if you have any of these:   You still have too much pain, or the pain gets worse, after taking the medicine. The medicine may not be strong enough. Or there may be a complication from the surgery.  You feel too sleepy, dizzy, or groggy. The medicine may be too strong.  Side effects, such as nausea or vomiting. Your healthcare provider may advise taking other medicines to treat these or may change your treatment plan..  Skin changes, such as rash, itching, or hives. This may mean you have an allergic reaction. Your provider may advise taking other medicines.  The incision looks different (for instance, part of it opens up).  Bleeding or fluid leaking from the incision site, and you weren't told to expect that.  Fever of 100.4°F (38°C) or higher, or as directed by your healthcare provider.  Call 911  Call 911 right away if you have:   Trouble breathing  Facial swelling    If you have obstructive sleep apnea   You were given anesthesia medicine during surgery to keep you comfortable and free of pain. After surgery, you may have more apnea spells because of this medicine and other medicines you were given. The spells may last longer than normal.    At home:  Keep using the continuous positive airway pressure (CPAP) device when you sleep. Unless your healthcare provider tells you not to, use it when you sleep, day or night. CPAP is a common device used to treat obstructive sleep apnea.  Talk with your provider before taking any pain medicine, muscle relaxants, or sedatives. Your provider will tell you about the possible dangers of  taking these medicines.  Contact your provider if your sleeping changes a lot even when taking medicines as directed.  [facility] last reviewed this educational content on 4/1/2024  This information is for informational purposes only. This is not intended to be a substitute for professional medical advice, diagnosis, or treatment. Always seek the advice and follow the directions from your physician or other qualified health care provider.  © 7965-5793 [employer]. All rights reserved. This information is not intended as a substitute for professional medical care. Always follow your healthcare professional's instructions.

## 2025-06-16 ENCOUNTER — APPOINTMENT (OUTPATIENT)
Dept: MAMMOGRAPHY | Facility: HOSPITAL | Age: 55
End: 2025-06-16
Attending: SURGERY
Payer: COMMERCIAL

## 2025-06-16 ENCOUNTER — HOSPITAL ENCOUNTER (OUTPATIENT)
Dept: MAMMOGRAPHY | Facility: HOSPITAL | Age: 55
Discharge: HOME | End: 2025-06-16
Attending: SURGERY
Payer: COMMERCIAL

## 2025-06-16 ENCOUNTER — HOSPITAL ENCOUNTER (OUTPATIENT)
Facility: HOSPITAL | Age: 55
Setting detail: HOSPITAL OUTPATIENT SURGERY
Discharge: HOME | End: 2025-06-16
Attending: SURGERY | Admitting: SURGERY
Payer: COMMERCIAL

## 2025-06-16 ENCOUNTER — ANESTHESIA EVENT (OUTPATIENT)
Dept: SURGERY | Facility: HOSPITAL | Age: 55
End: 2025-06-16
Payer: COMMERCIAL

## 2025-06-16 ENCOUNTER — ANESTHESIA (OUTPATIENT)
Dept: SURGERY | Facility: HOSPITAL | Age: 55
End: 2025-06-16
Payer: COMMERCIAL

## 2025-06-16 VITALS
DIASTOLIC BLOOD PRESSURE: 74 MMHG | SYSTOLIC BLOOD PRESSURE: 141 MMHG | WEIGHT: 265 LBS | HEART RATE: 98 BPM | OXYGEN SATURATION: 97 % | BODY MASS INDEX: 42.09 KG/M2 | RESPIRATION RATE: 15 BRPM | TEMPERATURE: 98 F | HEIGHT: 66.5 IN

## 2025-06-16 DIAGNOSIS — N60.99 ATYPICAL DUCTAL HYPERPLASIA OF BREAST: Primary | ICD-10-CM

## 2025-06-16 DIAGNOSIS — N60.99 ATYPICAL DUCTAL HYPERPLASIA OF BREAST: ICD-10-CM

## 2025-06-16 LAB — B-HCG UR QL: NEGATIVE

## 2025-06-16 PROCEDURE — 88307 TISSUE EXAM BY PATHOLOGIST: CPT | Performed by: SURGERY

## 2025-06-16 PROCEDURE — 19281 PERQ DEVICE BREAST 1ST IMAG: CPT | Performed by: SURGERY

## 2025-06-16 PROCEDURE — 88300 SURGICAL PATH GROSS: CPT | Performed by: SURGERY

## 2025-06-16 PROCEDURE — 76098 X-RAY EXAM SURGICAL SPECIMEN: CPT | Performed by: SURGERY

## 2025-06-16 PROCEDURE — 88360 TUMOR IMMUNOHISTOCHEM/MANUAL: CPT | Performed by: SURGERY

## 2025-06-16 PROCEDURE — 19282 PERQ DEVICE BREAST EA IMAG: CPT | Performed by: SURGERY

## 2025-06-16 PROCEDURE — 81025 URINE PREGNANCY TEST: CPT

## 2025-06-16 RX ORDER — LIDOCAINE HYDROCHLORIDE AND EPINEPHRINE 10; 10 MG/ML; UG/ML
INJECTION, SOLUTION INFILTRATION; PERINEURAL AS NEEDED
Status: DISCONTINUED | OUTPATIENT
Start: 2025-06-16 | End: 2025-06-16 | Stop reason: HOSPADM

## 2025-06-16 RX ORDER — ONDANSETRON 2 MG/ML
4 INJECTION INTRAMUSCULAR; INTRAVENOUS EVERY 6 HOURS PRN
Status: DISCONTINUED | OUTPATIENT
Start: 2025-06-16 | End: 2025-06-16

## 2025-06-16 RX ORDER — ONDANSETRON 2 MG/ML
INJECTION INTRAMUSCULAR; INTRAVENOUS AS NEEDED
Status: DISCONTINUED | OUTPATIENT
Start: 2025-06-16 | End: 2025-06-16 | Stop reason: SURG

## 2025-06-16 RX ORDER — BUPIVACAINE HYDROCHLORIDE 5 MG/ML
INJECTION, SOLUTION EPIDURAL; INTRACAUDAL; PERINEURAL AS NEEDED
Status: DISCONTINUED | OUTPATIENT
Start: 2025-06-16 | End: 2025-06-16 | Stop reason: HOSPADM

## 2025-06-16 RX ORDER — MIDAZOLAM HYDROCHLORIDE 1 MG/ML
INJECTION INTRAMUSCULAR; INTRAVENOUS AS NEEDED
Status: DISCONTINUED | OUTPATIENT
Start: 2025-06-16 | End: 2025-06-16 | Stop reason: SURG

## 2025-06-16 RX ORDER — DEXAMETHASONE SODIUM PHOSPHATE 4 MG/ML
VIAL (ML) INJECTION AS NEEDED
Status: DISCONTINUED | OUTPATIENT
Start: 2025-06-16 | End: 2025-06-16 | Stop reason: SURG

## 2025-06-16 RX ORDER — LIDOCAINE HYDROCHLORIDE 10 MG/ML
INJECTION, SOLUTION EPIDURAL; INFILTRATION; INTRACAUDAL; PERINEURAL AS NEEDED
Status: DISCONTINUED | OUTPATIENT
Start: 2025-06-16 | End: 2025-06-16 | Stop reason: SURG

## 2025-06-16 RX ORDER — HYDROCODONE BITARTRATE AND ACETAMINOPHEN 5; 325 MG/1; MG/1
1-2 TABLET ORAL EVERY 6 HOURS PRN
Qty: 20 TABLET | Refills: 0 | Status: SHIPPED | OUTPATIENT
Start: 2025-06-16

## 2025-06-16 RX ORDER — FAMOTIDINE 10 MG/ML
20 INJECTION, SOLUTION INTRAVENOUS ONCE
Status: COMPLETED | OUTPATIENT
Start: 2025-06-16 | End: 2025-06-16

## 2025-06-16 RX ORDER — ACETAMINOPHEN 500 MG
1000 TABLET ORAL ONCE
Status: COMPLETED | OUTPATIENT
Start: 2025-06-16 | End: 2025-06-16

## 2025-06-16 RX ORDER — MORPHINE SULFATE 4 MG/ML
2 INJECTION, SOLUTION INTRAMUSCULAR; INTRAVENOUS EVERY 10 MIN PRN
Status: DISCONTINUED | OUTPATIENT
Start: 2025-06-16 | End: 2025-06-16

## 2025-06-16 RX ORDER — PROCHLORPERAZINE EDISYLATE 5 MG/ML
5 INJECTION INTRAMUSCULAR; INTRAVENOUS EVERY 8 HOURS PRN
Status: DISCONTINUED | OUTPATIENT
Start: 2025-06-16 | End: 2025-06-16

## 2025-06-16 RX ORDER — MORPHINE SULFATE 10 MG/ML
6 INJECTION, SOLUTION INTRAMUSCULAR; INTRAVENOUS EVERY 10 MIN PRN
Status: DISCONTINUED | OUTPATIENT
Start: 2025-06-16 | End: 2025-06-16

## 2025-06-16 RX ORDER — MORPHINE SULFATE 4 MG/ML
4 INJECTION, SOLUTION INTRAMUSCULAR; INTRAVENOUS EVERY 10 MIN PRN
Status: DISCONTINUED | OUTPATIENT
Start: 2025-06-16 | End: 2025-06-16

## 2025-06-16 RX ORDER — HYDROMORPHONE HYDROCHLORIDE 1 MG/ML
0.2 INJECTION, SOLUTION INTRAMUSCULAR; INTRAVENOUS; SUBCUTANEOUS EVERY 5 MIN PRN
Status: DISCONTINUED | OUTPATIENT
Start: 2025-06-16 | End: 2025-06-16

## 2025-06-16 RX ORDER — GLYCOPYRROLATE 0.2 MG/ML
INJECTION, SOLUTION INTRAMUSCULAR; INTRAVENOUS AS NEEDED
Status: DISCONTINUED | OUTPATIENT
Start: 2025-06-16 | End: 2025-06-16 | Stop reason: SURG

## 2025-06-16 RX ORDER — NALOXONE HYDROCHLORIDE 0.4 MG/ML
0.08 INJECTION, SOLUTION INTRAMUSCULAR; INTRAVENOUS; SUBCUTANEOUS AS NEEDED
Status: DISCONTINUED | OUTPATIENT
Start: 2025-06-16 | End: 2025-06-16

## 2025-06-16 RX ORDER — METOCLOPRAMIDE HYDROCHLORIDE 5 MG/ML
INJECTION INTRAMUSCULAR; INTRAVENOUS AS NEEDED
Status: DISCONTINUED | OUTPATIENT
Start: 2025-06-16 | End: 2025-06-16 | Stop reason: SURG

## 2025-06-16 RX ORDER — HYDROMORPHONE HYDROCHLORIDE 1 MG/ML
0.6 INJECTION, SOLUTION INTRAMUSCULAR; INTRAVENOUS; SUBCUTANEOUS EVERY 5 MIN PRN
Status: DISCONTINUED | OUTPATIENT
Start: 2025-06-16 | End: 2025-06-16

## 2025-06-16 RX ORDER — FAMOTIDINE 20 MG/1
20 TABLET, FILM COATED ORAL ONCE
Status: COMPLETED | OUTPATIENT
Start: 2025-06-16 | End: 2025-06-16

## 2025-06-16 RX ORDER — HYDROMORPHONE HYDROCHLORIDE 1 MG/ML
0.4 INJECTION, SOLUTION INTRAMUSCULAR; INTRAVENOUS; SUBCUTANEOUS EVERY 5 MIN PRN
Status: DISCONTINUED | OUTPATIENT
Start: 2025-06-16 | End: 2025-06-16

## 2025-06-16 RX ORDER — SODIUM CHLORIDE, SODIUM LACTATE, POTASSIUM CHLORIDE, CALCIUM CHLORIDE 600; 310; 30; 20 MG/100ML; MG/100ML; MG/100ML; MG/100ML
INJECTION, SOLUTION INTRAVENOUS CONTINUOUS
Status: DISCONTINUED | OUTPATIENT
Start: 2025-06-16 | End: 2025-06-16

## 2025-06-16 RX ADMIN — MIDAZOLAM HYDROCHLORIDE 2 MG: 1 INJECTION INTRAMUSCULAR; INTRAVENOUS at 14:15:00

## 2025-06-16 RX ADMIN — DEXAMETHASONE SODIUM PHOSPHATE 8 MG: 4 MG/ML VIAL (ML) INJECTION at 14:16:00

## 2025-06-16 RX ADMIN — GLYCOPYRROLATE 0.2 MG: 0.2 INJECTION, SOLUTION INTRAMUSCULAR; INTRAVENOUS at 14:16:00

## 2025-06-16 RX ADMIN — SODIUM CHLORIDE, SODIUM LACTATE, POTASSIUM CHLORIDE, CALCIUM CHLORIDE: 600; 310; 30; 20 INJECTION, SOLUTION INTRAVENOUS at 14:56:00

## 2025-06-16 RX ADMIN — ONDANSETRON 4 MG: 2 INJECTION INTRAMUSCULAR; INTRAVENOUS at 14:16:00

## 2025-06-16 RX ADMIN — LIDOCAINE HYDROCHLORIDE 50 MG: 10 INJECTION, SOLUTION EPIDURAL; INFILTRATION; INTRACAUDAL; PERINEURAL at 14:16:00

## 2025-06-16 RX ADMIN — METOCLOPRAMIDE HYDROCHLORIDE 10 MG: 5 INJECTION INTRAMUSCULAR; INTRAVENOUS at 14:16:00

## 2025-06-16 NOTE — BRIEF OP NOTE
Pre-Operative Diagnosis: Atypical ductal hyperplasia of breast [N60.99]     Post-Operative Diagnosis: Atypical ductal hyperplasia of breast [N60.99]      Procedure Performed:   Right breast 2 site wire localized excisional biopsy    Surgeons and Role:     * Ailyn Williamson MD - Primary    Assistant(s):   Maira Millan     Surgical Findings: Anterior lumpectomy site with visible biopsy clip and posterior lumpectomy site with visible biopsy clip     Specimen: Anterior lumpectomy right breast, posterior lumpectomy right breast     Estimated Blood Loss: 5cc    Ailyn Williamson MD  6/16/2025  2:16 PM

## 2025-06-16 NOTE — ANESTHESIA PREPROCEDURE EVALUATION
Anesthesia PreOp Note    HPI:     Chelsey Barroso is a 54 year old female who presents for preoperative consultation requested by: Ailyn Williamson MD    Date of Surgery: 6/16/2025    Procedure(s):  Right breast wire bracketed excisional biopsy  Indication: Atypical ductal hyperplasia of breast [N60.99]    Relevant Problems   No relevant active problems       NPO:  Last Liquid Consumption Date: 06/16/25  Last Liquid Consumption Time: 0600 (SIP OF WATER WITH MEDS)  Last Solid Consumption Date: 06/15/25  Last Solid Consumption Time: 1900  Last Liquid Consumption Date: 06/16/25          History Review:  Patient Active Problem List    Diagnosis Date Noted    Dyslipidemia 03/12/2025    Attention deficit disorder (ADD) without hyperactivity 09/12/2024    Screening mammogram for breast cancer 08/15/2023    Breast cancer screening, high risk patient 08/15/2023    Encounter for therapeutic drug monitoring 08/15/2023    Encounter for monitoring tamoxifen therapy 06/06/2019    Adjustment reaction with anxiety and depression 02/16/2019    Malignant neoplasm of upper-outer quadrant of left breast in female, estrogen receptor positive (HCC) 11/07/2018    Hypercholesterolemia with hypertriglyceridemia 10/04/2018    Morbid obesity with BMI of 40.0-44.9, adult (HCC) 10/04/2018    Hypertriglyceridemia 02/02/2017    Vitamin D deficiency 02/02/2017    Vitamin B12 deficiency 02/02/2017    Obesity (BMI 30-39.9) 11/07/2015    FH: breast cancer in first degree relative 02/03/2015    Anxiety and depression 10/06/2014       Past Medical History[1]    Past Surgical History[2]    Prescriptions Prior to Admission[3]  Current Medications and Prescriptions Ordered in Epic[4]    Allergies[5]    Family History[6]  Social Hx on file[7]    Available pre-op labs reviewed.  Lab Results   Component Value Date    URINEPREG Negative 06/16/2025             Vital Signs:  Body mass index is 42.13 kg/m².   height is 1.689 m (5' 6.5\") and weight is 120.2  kg (265 lb). Her oral temperature is 98.1 °F (36.7 °C). Her blood pressure is 141/79 and her pulse is 75. Her respiration is 16 and oxygen saturation is 95%.   Vitals:    06/13/25 1029 06/16/25 0807   BP:  141/79   Pulse:  75   Resp:  16   Temp:  98.1 °F (36.7 °C)   TempSrc:  Oral   SpO2:  95%   Weight: 117.9 kg (260 lb) 120.2 kg (265 lb)   Height: 1.689 m (5' 6.5\") 1.689 m (5' 6.5\")        Anesthesia Evaluation      Airway   Mallampati: I  TM distance: >3 FB  Neck ROM: full  Dental - Dentition appears grossly intact     Pulmonary - normal exam    breath sounds clear to auscultation  Cardiovascular - normal exam  (-) murmur    Rhythm: regular  Rate: normal    Neuro/Psych    (+)  anxiety/panic attacks,  attention deficit disorder, depression      GI/Hepatic/Renal      Endo/Other    Abdominal  - normal exam                 Anesthesia Plan:   ASA:  3  Plan:   MAC  Plan Comments: Anesthesia plan was discussed with the patient, going through the rationale, expectations, benefits and risks namely injury to lips, teeth, gyms or corneas, risks of an allergic reaction, risk of awareness or recall of intra-operative events, risk of prolonged intubation and ICU admission, risks of kidney failure, risk of coronary events or dysrhythmias, risk of cerebrovascular events or stroke and on rare occasions death.         I have informed Chelsey Barroso and/or legal guardian or family member of the nature of the anesthetic plan, benefits, risks including possible dental damage if relevant, major complications, and any alternative forms of anesthetic management.   All of the patient's questions were answered to the best of my ability. The patient desires the anesthetic management as planned.  Juan Pires MD  6/16/2025 1:58 PM  Present on Admission:  **None**           [1]   Past Medical History:   Anxiety state, unspecified    Attention deficit disorder    Breast CA (HCC)    Breast mass, left    has had mammo, ultrasound,  biopsy    Cancer (HCC)    left breast    Colon adenoma    x1    Depression    High cholesterol    Hypercholesterolemia with hypertriglyceridemia    Morbid obesity with BMI of 40.0-44.9, adult (HCC)    Vitamin D deficiency   [2]   Past Surgical History:  Procedure Laterality Date    Benign biopsy left      Benign biopsy right             delivery only N/A     ,     Colonoscopy  2022    Colonoscopy N/A 2022    Procedure: COLONOSCOPY;  Surgeon: Kt Bailey MD;  Location: [facility] ENDOSCOPY    Lumpectomy left Left 10/29/2018    Tiffany biopsy stereo nodule 2 site bilat (cpt=[id number])      negative    Needle biopsy left  10/08/2018    Needle biopsy right  10/08/2018    Radiation left  2019    Sinus surgery        Tonsillectomy     [3]   Medications Prior to Admission   Medication Sig Dispense Refill Last Dose/Taking    CUSTOM MEDICATION Semaglutide/ cyanocobalamin    1 mg -   Concentration: 5 mg/ 0.5 mL   Amount: 1 ML vial   Instructions: Injection 0.2 mL/ 20 units sq weekly 1 each 5 2025    Phentermine HCl 15 MG Oral Cap Take 1 capsule (15 mg total) by mouth every morning. 30 capsule 5 2025    Dexmethylphenidate HCl ER 25 MG Oral Capsule SR 24 Hr Take 1 capsule (25 mg total) by mouth every morning.   2025    ALPRAZolam 0.5 MG Oral Tab Take 1 tablet (0.5 mg total) by mouth.   2025 at  6:00 AM    VRAYLAR 3 MG Oral Cap 3 mg.   6/15/2025 at  2:00 PM    sertraline 100 MG Oral Tab Take 1.5 tablets (150 mg total) by mouth in the morning.   6/15/2025 at  1:00 PM    traZODone HCl 100 MG Oral Tab Take 2 tablets (200 mg total) by mouth. At Bedtime  1 6/15/2025 at  7:00 PM    Nystatin 706201 UNIT/GM External Powder Apply 1 Application topically 4 (four) times daily. 60 g 0 More than a month    ondansetron (ZOFRAN) 4 mg tablet Take 1 tablet (4 mg total) by mouth every 8 (eight) hours as needed for Nausea. 20 tablet 2 More than a month    Multiple Vitamin  (MULTIVITAMINS) Oral Cap Take 1 capsule by mouth in the morning.   More than a month   [4]   Current Facility-Administered Medications Ordered in Epic   Medication Dose Route Frequency Provider Last Rate Last Admin    lactated ringers infusion   Intravenous Continuous Ailyn Williamson MD 20 mL/hr at 25 0814 New Bag at 25 0814    ceFAZolin (Ancef) 2g in 10mL IV syringe premix  2 g Intravenous Once Ailyn Williamson MD         No current [facility]-ordered outpatient medications on file.   [5]   Allergies  Allergen Reactions    Bactrim [Sulfamethoxazole W/Trimethoprim] HIVES    Latex HIVES and RASH   [6]   Family History  Problem Relation Age of Onset    Breast Cancer Self     Other (Other) Mother         cushings syndrome    Heart Disorder Father     Diabetes Father     Hypertension Father     Breast Cancer Sister 47    Breast Cancer Sister 51    Cancer Maternal Grandfather 70        prostate/bladder ca    Breast Cancer Paternal Grandmother 43        d. 70    Cancer Maternal Uncle 80        unknown primary   [7]   Social History  Socioeconomic History    Marital status:    Tobacco Use    Smoking status: Former     Current packs/day: 0.00     Average packs/day: 0.5 packs/day for 10.0 years (5.0 ttl pk-yrs)     Types: Cigarettes     Start date: 10/24/1991     Quit date: 10/24/2001     Years since quittin.6    Smokeless tobacco: Never   Vaping Use    Vaping status: Never Used   Substance and Sexual Activity    Alcohol use: Yes     Comment: socially    Drug use: No     Comment: never   Other Topics Concern    Caffeine Concern Yes     Comment: 1-2 cups of coffee

## 2025-06-16 NOTE — ANESTHESIA POSTPROCEDURE EVALUATION
Patient: Chelsey Barroso    Procedure Summary       Date: 06/16/25 Room / Location: [facility] MAIN OR 02 / [facility] MAIN OR    Anesthesia Start: 1414 Anesthesia Stop: 1503    Procedure: Right breast wire bracketed excisional biopsy (Right: Chest) Diagnosis:       Atypical ductal hyperplasia of breast      (Atypical ductal hyperplasia of breast [N60.99])    Surgeons: Ailyn Williamson MD Anesthesiologist: Juan Pires MD    Anesthesia Type: MAC ASA Status: 3            Anesthesia Type: MAC    Vitals Value Taken Time   /87 06/16/25 15:03   Temp 98 °F (36.7 °C) 06/16/25 15:03   Pulse 88 06/16/25 15:03   Resp 16 06/16/25 15:03   SpO2 98 % 06/16/25 15:03   Vitals shown include unfiled device data.    [facility] AN Post Evaluation:   Patient Evaluated in PACU  Patient Participation: complete - patient participated  Level of Consciousness: awake and alert  Pain Score: 0  Pain Management: adequate  Airway Patency:patent  Yes    Nausea/Vomiting: none  Cardiovascular Status: acceptable, blood pressure returned to baseline and hemodynamically stable  Respiratory Status: acceptable  Postoperative Hydration acceptable      Tish Franco CRNA  6/16/2025 3:04 PM

## 2025-06-16 NOTE — IMAGING NOTE
0912 Pt  to mammography department in wheelchair      Hx taken, procedure explained, questions answered.  IV patent, no redness or swelling noted at site.    Consent signed and verified     0921 Dr MATOS here scanning completed. Order verified and signed by all procedural staff members    0921 Time out taken,  site marked RIGHT Breast    0924  scan taken by Valerie, mammo tech    SITE # 1 ANTERIOR, STOPLIGHT CLIP SITE    0926 Chloro prep as skin prep to site.  Lidocaine  1% 10 milligrams per ml given as anesthetic affect from kit 3 ml total given.    0927 Ghiatas needle  20 gauge  X 9 cm placed.  Pt re-imaged, site complete at 0933.    0934 BB marker to site, wire secured to breast strips.      SITE # 2:POSTERIOR, HOURGLASS CLIP SITE    0937  scan taken by Valerie, mammo tech    0937 Chloro prep as skin prep to site.  Lidocaine 1% 10 milligrams per ml given as anesthetic affect from kit 3 ml total given.    0939 Ghiatas needle  20 gauge  X 9 cm placed.  Pt re-imaged, procedure complete at 0944.    0945 BB marker to site, wire secured to breast strips.  A 4x4 dsg secured  over wire with tape by this RN after images completed .    Pt feels well, IV patent, no redness or swelling noted at site. Pt moved to Radiology Holding bay pending transporter arrival     1006 Transporter arrived to return pt to her room.

## 2025-06-16 NOTE — H&P
Diagnosis: Left breast cancer status post lumpectomy on October 29, 2018 and status post sentinel lymph node biopsy in November 12, 2018.     Stage:  Cancer Staging   Malignant neoplasm of upper-outer quadrant of left breast in female, estrogen receptor positive (HCC)  Staging form: Breast, AJCC 8th Edition  - Pathologic stage from 11/7/2018: Stage IA (pT1a, pN0, cM0, G2, ER: Positive, OR: Positive, HER2: Negative) - Signed by Deacon Mclean MD on 2/6/2019     Disease Status:  Surgical treatment complete, RT completed, tamoxifen ongoing.     History:   This 54 year old woman presented  in June 2017 with multiple concerns related to her Left breast. She reports that several years ago, she developed sudden onset of Left breast swelling. This was associated with pain at the time and to date she continues to have tenderness focally in this area. The patient reports that this was worked up with imaging at [facility] in 2013 and she was told that she has \"extra breast tissue in that area.\" As a result of her workup at [facility], she underwent a stereotactic breat biopsy in March 2014 to clarify the focal asymmetric tissue in the Left breast at the site of her clinical concerns. This was consistent with benign breast tissue. She does report that her symptoms are worse during her menstrual cycle but that they do occasionally occur daily. She denies any nipple discharge or skin changes. She does report that her tender symptoms do occasionally radiate into the Left axilla. She does have family history of breast cancer as detailed below.      Bilateral Diagnostic Mammogram on October 15, 2014 performed at [facility] which showed scattered fibroglandular densities (25-50% glandular) with no suspicious masses or calcifications and stable Left UOQ asymmetry with visible tissue marker in this area consistent with her OSH biopsy site. Left Breast US was performed the same day in light of the patient's pain symptoms and was  consistent with dense breast tissue corresponding to the asymmetry seen on mammogram with a visible biopsy tract for which a 3 month surveillance was recommended to document stability. She returned on February 6, 2015 for the Left breast surveillance US which confirmed that the area related to her biopsy which was smaller compared to prior and thought to be representative of post-biopsy changes.      Since that time, she has been followed closely for high risk surveillance in light of the family history of breast cancer.  Presently she has no new clinical concerns today.  She had a mammogram performed on June 30, 2016 which was reported as benign.  She did undergo MRI on January 10, 2017 for high risk screening protocol which demonstrated what appeared to be a sebaceous cyst in the right axillary area as well as a 1.2 cm mass at the 9 o'clock position of the right breast for which a second look ultrasound was recommended with no MRI evidence of malignancy in the left breast.  The targeted right breast ultrasound took place on January 23, 2017 and confirmed fatty replaced lymph nodes in the axilla with a 1.1 cm normal-appearing intramammary lymph node at the site of concern at 9:00 on the right breast with no other suspicious findings.  Surveillance bilateral 3D screening mammogram took place on September 23, 2017 and confirmed no new changes bilaterally.  On September 18, 2018 she underwent an MRI of the breast for high risk surveillance that showed multiple concerns bilaterally.  On October 3, 2018 repeated diagnostic mammogram and ultrasound was performed that showed no explanation for focal areas of enhancement that had been seen on the prior MRI bilaterally for which a bilateral MRI guided biopsy was recommended.  The bilateral breast MRI guided biopsy took place on October 8, 2018 and in the right breast she was found to have a benign fibroadenomatous change.  The left breast demonstrated a 4 mm focus of ductal  carcinoma in situ that was ER/TX positive.  Of note, the patient did undergo a comprehensive genetic evaluation in May 2015 that was negative.  She elected for breast conserving therapy which took place without complication however she was noted to have a small component of invasive disease and elected to return to the OR for a sentinel lymph node biopsy for purposes of staging with ultimately negative lymph node biopsy results.  Since then, she denies any new concerns related to bilateral breast.  She completed her surveillance imaging under the direction of Dr. Mclean who is managing her tamoxifen.  She is tolerating this with no significant side effects.  She does report a inpatient admission for depression since her last visit.  She is here today for evaluation and recommendations for further therapy.         Past Medical History       Past Medical History:    Anxiety state, unspecified    Attention deficit disorder    Breast CA (HCC)    Breast mass, left     has had mammo, ultrasound, biopsy    Cancer (HCC)     left breast    Colon adenoma     x1    Depression    High cholesterol    Hypercholesterolemia with hypertriglyceridemia    Morbid obesity with BMI of 40.0-44.9, adult (HCC)    Vitamin D deficiency         Past Surgical History         Past Surgical History:   Procedure Laterality Date    Benign biopsy left        Benign biopsy right                 delivery only N/A       ,     Colonoscopy   2022    Colonoscopy N/A 2022     Procedure: COLONOSCOPY;  Surgeon: Kt Bailey MD;  Location: [facility] ENDOSCOPY    Lumpectomy left Left 10/29/2018    Tiffany biopsy stereo nodule 2 site bilat (cpt=[id number])        negative    Needle biopsy left   10/08/2018    Needle biopsy right   10/08/2018    Radiation left   2019    Sinus surgery          Tonsillectomy          Left Breast Stereotactic biopsy in 3/2014-Benign     Gynecological History:  Pt is a   Pt was 31 years old  at time of first pregnancy.     She has a cumulative breastfeeding history of 4 months, last many years ago.  She achieved menarche at age 13 and LMP     She has a history of oral contraceptive use for 20 years, last in 2011.  She denies infertility treatment to achieve pregnancy.     Medications:     Medications Ordered Prior to Encounter          Current Outpatient Medications on File Prior to Visit   Medication Sig Dispense Refill    CUSTOM MEDICATION Semaglutide/ cyanocobalamin     1 mg -   Concentration: 5 mg/ 0.5 mL   Amount: 1 ML vial   Instructions: Injection 0.2 mL/ 20 units sq weekly 1 each 5    Phentermine HCl 15 MG Oral Cap Take 1 capsule (15 mg total) by mouth every morning. 30 capsule 5    Dexmethylphenidate HCl ER 25 MG Oral Capsule SR 24 Hr Take 1 capsule (25 mg total) by mouth every morning.        Nystatin [id number] UNIT/GM External Powder Apply 1 Application topically 4 (four) times daily. 60 g 0    ALPRAZolam 0.5 MG Oral Tab Take 1 tablet (0.5 mg total) by mouth.        VRAYLAR 3 MG Oral Cap 3 mg.        sertraline 100 MG Oral Tab Take 1.5 tablets (150 mg total) by mouth in the morning.        ondansetron (ZOFRAN) 4 mg tablet Take 1 tablet (4 mg total) by mouth every 8 (eight) hours as needed for Nausea. 20 tablet 2    traZODone HCl 100 MG Oral Tab Take 2 tablets (200 mg total) by mouth. At Bedtime   1    Multiple Vitamin (MULTIVITAMINS) Oral Cap Take 1 capsule by mouth in the morning.          No current facility-administered medications on file prior to visit.            Allergies:     Allergies        Allergies   Allergen Reactions    Bactrim [Sulfamethoxazole W/Trimethoprim] HIVES    Latex HIVES and RASH                   Family History    Problem  Relation  Age of Onset      Heart Disorder  Father        Diabetes  Father        Hypertension  Father        Breast Cancer  Paternal Grandmother  43        d. 70      Breast Cancer  Sister  47      Cancer  Maternal Uncle  80        unknown primary       Cancer  Maternal Grandfather  70        prostate/bladder ca    She is not of Ashkenazi Anabaptist ancestry.     Social History:     Alcohol Use:  Yes    Comment:  socially                 Smoking status:  Former Smoker  0.50 Packs/Day  For 10.00 Years    Types:  Cigarettes    Smokeless tobacco:  Never Used    She is currently unemployed. She moved to [facility] from [place] in July 2013 with her 2 daughters. She is  and was living in [place] previously.      Review of Systems:  General:    The patient denies, fever, chills, night sweats, fatigue, generalized weakness, change in appetite or weight loss.     HEENT:      The patient denies eye irritation, cataracts, redness, glaucoma, yellowing of the eyes, change in vision or color blindness. The patient denies hearing loss, ringing in the ears, ear drainage, earaches, nasal congestion, nose bleeds, snoring, pain in mouth/throat, hoarseness, change in voice, facial trauma.     Respiratory:  The patient denies chronic cough, phlegm, hemoptysis, pleurisy/chest pain, pneumonia, asthma, wheezing, difficulty in breathing with exertion, emphysema, chronic bronchitis, shortness of breast or abnormal sound when breathing.      Cardiovascular:  There is no history of chest pain, chest pressure/discomfort, palpitations, irregular heartbeat, fainting or near-fainting, difficulty breathing when lying flat, SOB/Coughing at night, swelling of the legs or chest pain while walking.     Breasts:  See history of present illness     Gastrointestinal:      There is no history of difficulty or pain with swallowing, reflux symptoms, vomiting, dark or bloody stools, constipation, yellowing of the skin, indigestion, nausea, change in bowel habits, diarrhea, abdominal pain or vomiting blood.      Genitourinary:  The patient denies frequent urination, needing to get up at night to urinate, urinary hesitancy or retaining urine, painful urination, urinary incontinence, decreased  urine stream, blood in the urine or vaginal/penile discharge.     Skin:     The patient denies rash, itching, skin lesions, dry skin, change in skin color or change in moles.      Hematologic/Lymphatic:  The patient denies easily bruising or bleeding or persistent swollen glands or lymph nodes.      Musculoskeletal:  The patient denies muscle aches/pain, joint pain, stiff joints, neck pain, back pain or bone pain.     Neuropsychiatric:  There is no history of migraines or severe headaches, seizure/epilepsy, speech problems, coordination problems, trembling/tremors, fainting/black outs, dizziness, memory problems, loss of sensation/numbness, problems walking, weakness, tingling or burning in hands/feet. There is no history of abusive relationship, bipolar disorder, sleep disturbance, depression or feeling of despair. +anxiety     Endocrine:     There is no history of poor/slow wound healing, weight loss/gain, fertility or hormone problems, cold intolerance, thyroid disease.      Allergic/Immunologic:  There is no history of hives, hay fever, angioedema or anaphylaxis.     /88 (BP Location: Left arm, Patient Position: Sitting, Cuff Size: large)   Pulse 88   Temp 97 °F (36.1 °C) (Temporal)   Resp 16   Ht 1.676 m (5' 6\")   Wt 117.9 kg (260 lb)   LMP 03/15/2024 (Approximate)   SpO2 97%   BMI 41.97 kg/m²      Physical Examination:   This is a well-nourished, alert and oriented woman. There is not palpable cervical, supraclavicular or axillary adenopathy.  The neck is supple with a midline trachea and no thyromegaly. Range of motion is good at both shoulders. Breasts are symmetrical. The tumorectomy site is in the upper outer left  breast and shows no evidence of local recurrence. Both nipples are everted with no discharge. There is not dominant masses, focal nodularity or skin changes on either side. The abdomen is soft, flat and nontender, with no palpable masses or organomegaly.     Imaging:  Her most  recent imaging was performed on April 29, 2020 and this bilateral evaluation demonstrated no suspicious findings.  She subsequently had an MRI on Michelle 10, 2020 which was also unremarkable.     Assessment:  48 y/o F with h/o benign Left breast biopsy and an increased risk for breast cancer based on multiple risk assessment models with new diagnosis of left breast cancer status post lumpectomy.       Discussion and Plan:  I had a discussion with the patient regarding her breast exam. On exam today I found her to be healing well since her surgery with no signs of new or recurrent disease.  I personally reviewed her recent imaging which is concordant with this finding.  She will continue to follow with medical oncology for management of her tamoxifen. We will plan to get her back on track with imaging surveillance with a left diagnostic evaluation in approximately October 2020.  We will likely plan for an hour MRI again and June 2021.  I encouraged her to continue monitoring her ROM and strength and explained that a referral to physical therapy may be warranted in the future if she identifies any limitations or restrictions. She was encouraged to contact the office with any questions or concerns prior to her next appointment.    Pre-op Diagnosis: Atypical ductal hyperplasia of breast [N60.99]    The above referenced H&P was reviewed by Ailyn Williamson MD on 6/16/2025, the patient was examined and no significant changes have occurred in the patient's condition since the H&P was performed.  I discussed with the patient and/or legal representative the potential benefits, risks and side effects of this procedure; the likelihood of the patient achieving goals; and potential problems that might occur during recuperation.  I discussed reasonable alternatives to the procedure, including risks, benefits and side effects related to the alternatives and risks related to not receiving this procedure.  We will proceed with  procedure as planned.

## 2025-06-17 NOTE — OPERATIVE REPORT
[facility]    PATIENT'S NAME: LISA SCHREIBER   ATTENDING PHYSICIAN: Ailyn Williamson MD   OPERATING PHYSICIAN: Ailyn Williamson MD   PATIENT ACCOUNT#:   [id number]    LOCATION:  [facility] 10  MEDICAL RECORD #:   [id number]       YOB: 1970  ADMISSION DATE:       06/16/2025      OPERATION DATE:  06/16/2025    OPERATIVE REPORT    PREOPERATIVE DIAGNOSIS:  Atypical hyperplasia of the right breast.  POSTOPERATIVE DIAGNOSIS:  Atypical hyperplasia of the right breast.  PROCEDURE:  Right breast 2-site wire localized lumpectomy with 2-site specimen radiogram.    ASSISTANT:  Maira Millan CSA     ANESTHESIA:  Monitored anesthesia care and local.    ESTIMATED BLOOD LOSS:  5 mL.    DRAINS:  None.    COMPLICATIONS:  None.    DISPOSITION:  Stable on transfer to recovery room.    INDICATIONS:  The patient is a 54-year-old female.  She has a personal history of a left breast cancer status post lumpectomy and sentinel lymph node biopsy back in 2018.  On recent imaging, she was noted to have new concerns in her right breast for which she underwent a 2-site MRI-guided biopsy.  Pathology of both locations did demonstrate atypical hyperplasia at the anterior site, and in the posterior site she was found to have pseudoangiomatous stromal hyperplasia, thought to be possibly discordant with the imaging; therefore, an excision of both areas was recommended.  Risks and possible complications were discussed with the patient including, but not limited to, infection, bleeding, injury to surrounding structures, and possible need for reoperation.  She agreed to the proposed surgery.    OPERATIVE TECHNIQUE:  Patient was brought to the imaging suite where she had a 2-site wire localization in the areas of concern in the breast.  She was then brought to the OR, placed in supine position, properly padded and secured.  She was given a dose of IV antibiotics, and sequential compression devices were  applied to the legs for DVT prophylaxis.  Monitored anesthesia care was induced.  The right breast was prepped and draped in usual sterile fashion.  Lidocaine 1% with epinephrine was used to infiltrate the targeted incision site, and I made a curvilinear incision in the upper outer quadrant between the 2 wires.  I first turned my trajectory towards the anterior site.  Using sharp dissection and electrocautery, a segment of breast tissue surrounding the tip of the wire was carefully excised and oriented with a short stitch and single clip superiorly, long stitch and double clip laterally, in order to allow for appropriate pathological margin assessment and review.  This was then placed in the imaging device where specimen x-ray confirmed the presence of the targeted clip and area with adequate margins as deemed by myself, and sent for routine analysis as right breast anterior lumpectomy, short stitch and single clip superiorly, long stitch and double clip laterally.  The wound was inspected with no additional biopsy site changes or concerns.  I extended her incision along the lateral aspect with a 15-blade knife in the skin.  Using sharp dissection and electrocautery, I tunneled and found the posterior wire.  This was identified, brought into the field, and a segment of breast tissue surrounding the tip of the wire was carefully excised and oriented with a short stitch and single clip superiorly, long stitch and double clip laterally, in order to allow for appropriate pathological margin assessment and review, and sent for routine analysis as right breast posterior lumpectomy.  I placed in this imaging device where specimen x-ray did confirm the presence of targeted biopsy site and clip with adequate margins as deemed by myself, and I marked this with a clip inside of the cavity to assist with subsequent surveillance.  The wounds were then inspected.  Irrigation was performed.  Electrocautery was used for hemostasis.   Her skin wounds were closed with an interrupted 3-0 Vicryl for deep layer and a running 4-0 subcuticular Monocryl for skin.  Mastisol and Steri-Strips were applied, and 0.5% Marcaine was instilled in the cavity to assist with postoperative analgesia.  A sterile dressing and compression bra were placed.  Blood loss was minimal.  All counts were correct at the conclusion of the procedure.  She tolerated the procedure well and was transferred to the recovery area in stable condition.    Dictated By Ailyn Williamson MD  d: 06/17/2025 08:18:15  t: 06/17/2025 13:02:45  Job [id number]  CMG/    cc: MD James Ureña MD Amaryllis Gil, MD

## 2025-06-19 ENCOUNTER — TELEPHONE (OUTPATIENT)
Age: 55
End: 2025-06-19

## 2025-06-19 ENCOUNTER — TELEPHONE (OUTPATIENT)
Dept: SURGERY | Facility: CLINIC | Age: 55
End: 2025-06-19

## 2025-06-19 NOTE — TELEPHONE ENCOUNTER
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 Patient needs to meet with Rad/Med onc for further treatment recommendations.  She has one margin <1mm as the additional posterior lumpectomy was immediately posterior to the anterior lumpectomy site. We explained that a re-excision versus completion mastectomy could be considered but that if she is agreeable to endocrine therapy and/or radiation therapy may not be necessary.  We will coordinate these visits for her and cancel her upcoming screening mammogram that was scheduled for September.  She will see me next week for her wound check.

## 2025-06-20 NOTE — TELEPHONE ENCOUNTER
NN contacted patient at the request of Dr. Williamson to assist with scheduling medical oncology and radiation oncology appointments. Patient is established with Dr. Mclean, NN explained that follow up is needed sooner than currently scheduled 2/2026 appointment. Patient expressed understanding and is agreeable. Patient hoping to schedule appointment so that she will not have to miss work. Patient now scheduled with Dr. Mclean on 7/2 at 0730. NN explained that rad onc team will reach out to schedule consultation with Dr. Cross. Patient appreciative of the call and encouraged to reach out with questions or concerns.

## 2025-06-25 ENCOUNTER — OFFICE VISIT (OUTPATIENT)
Facility: CLINIC | Age: 55
End: 2025-06-25
Payer: COMMERCIAL

## 2025-06-25 VITALS
WEIGHT: 260 LBS | HEART RATE: 96 BPM | OXYGEN SATURATION: 96 % | SYSTOLIC BLOOD PRESSURE: 157 MMHG | RESPIRATION RATE: 16 BRPM | DIASTOLIC BLOOD PRESSURE: 92 MMHG | BODY MASS INDEX: 41 KG/M2

## 2025-06-25 DIAGNOSIS — D05.11 BREAST NEOPLASM, TIS (DCIS), RIGHT: Primary | ICD-10-CM

## 2025-06-25 DIAGNOSIS — C50.412 CARCINOMA OF UPPER-OUTER QUADRANT OF LEFT BREAST IN FEMALE, ESTROGEN RECEPTOR POSITIVE (HCC): ICD-10-CM

## 2025-06-25 DIAGNOSIS — Z17.0 CARCINOMA OF UPPER-OUTER QUADRANT OF LEFT BREAST IN FEMALE, ESTROGEN RECEPTOR POSITIVE (HCC): ICD-10-CM

## 2025-06-30 PROBLEM — D05.11 BREAST NEOPLASM, TIS (DCIS), RIGHT: Status: ACTIVE | Noted: 2025-06-30

## 2025-06-30 PROBLEM — Z17.0 CARCINOMA OF UPPER-OUTER QUADRANT OF LEFT BREAST IN FEMALE, ESTROGEN RECEPTOR POSITIVE (HCC): Status: ACTIVE | Noted: 2025-06-30

## 2025-06-30 PROBLEM — C50.412 CARCINOMA OF UPPER-OUTER QUADRANT OF LEFT BREAST IN FEMALE, ESTROGEN RECEPTOR POSITIVE (HCC): Status: ACTIVE | Noted: 2025-06-30

## 2025-06-30 NOTE — PROGRESS NOTES
Breast Surgery Post-Operative Visit    Diagnosis: Left breast cancer status post lumpectomy on October 29, 2018 and status post sentinel lymph node biopsy in November 12, 2018 status post right breast lumpectomy for DCIS.    Stage:  Cancer Staging   Malignant neoplasm of upper-outer quadrant of left breast in female, estrogen receptor positive (HCC)  Staging form: Breast, AJCC 8th Edition  - Pathologic stage from 11/7/2018: Stage IA (pT1a, pN0, cM0, G2, ER: Positive, LA: Positive, HER2: Negative) - Signed by Deacon Mclean MD on 2/6/2019    Disease Status: Treatment of DCIS of right breast with consideration of risk reducing endocrine and radiation therapy pending.  History:   This 54 year old woman presented  in June 2017 with multiple concerns related to her Left breast. She reports that several years ago, she developed sudden onset of Left breast swelling. This was associated with pain at the time and to date she continues to have tenderness focally in this area. The patient reports that this was worked up with imaging at [facility] in 2013 and she was told that she has \"extra breast tissue in that area.\" As a result of her workup at [facility], she underwent a stereotactic breat biopsy in March 2014 to clarify the focal asymmetric tissue in the Left breast at the site of her clinical concerns. This was consistent with benign breast tissue. She does report that her symptoms are worse during her menstrual cycle but that they do occasionally occur daily. She denies any nipple discharge or skin changes. She does report that her tender symptoms do occasionally radiate into the Left axilla. She does have family history of breast cancer as detailed below.     Bilateral Diagnostic Mammogram on October 15, 2014 performed at [facility] which showed scattered fibroglandular densities (25-50% glandular) with no suspicious masses or calcifications and stable Left UOQ asymmetry with visible tissue marker in this area  consistent with her OSH biopsy site. Left Breast US was performed the same day in light of the patient's pain symptoms and was consistent with dense breast tissue corresponding to the asymmetry seen on mammogram with a visible biopsy tract for which a 3 month surveillance was recommended to document stability. She returned on February 6, 2015 for the Left breast surveillance US which confirmed that the area related to her biopsy which was smaller compared to prior and thought to be representative of post-biopsy changes.     Since that time, she has been followed closely for high risk surveillance in light of the family history of breast cancer.  Presently she has no new clinical concerns today.  She had a mammogram performed on June 30, 2016 which was reported as benign.  She did undergo MRI on January 10, 2017 for high risk screening protocol which demonstrated what appeared to be a sebaceous cyst in the right axillary area as well as a 1.2 cm mass at the 9 o'clock position of the right breast for which a second look ultrasound was recommended with no MRI evidence of malignancy in the left breast.  The targeted right breast ultrasound took place on January 23, 2017 and confirmed fatty replaced lymph nodes in the axilla with a 1.1 cm normal-appearing intramammary lymph node at the site of concern at 9:00 on the right breast with no other suspicious findings.  Surveillance bilateral 3D screening mammogram took place on September 23, 2017 and confirmed no new changes bilaterally.  On September 18, 2018 she underwent an MRI of the breast for high risk surveillance that showed multiple concerns bilaterally.  On October 3, 2018 repeated diagnostic mammogram and ultrasound was performed that showed no explanation for focal areas of enhancement that had been seen on the prior MRI bilaterally for which a bilateral MRI guided biopsy was recommended.  The bilateral breast MRI guided biopsy took place on October 8, 2018 and in  the right breast she was found to have a benign fibroadenomatous change.  The left breast demonstrated a 4 mm focus of ductal carcinoma in situ that was ER/MA positive.  Of note, the patient did undergo a comprehensive genetic evaluation in May 2015 that was negative.  She elected for breast conserving therapy which took place without complication however she was noted to have a small component of invasive disease and elected to return to the OR for a sentinel lymph node biopsy for purposes of staging with ultimately negative lymph node biopsy results.  Since then, she denies any new concerns related to bilateral breast.  She completed her surveillance imaging under the direction of Dr. Mclean who is managing her tamoxifen.  She is tolerating this with no significant side effects.  She did develop a concerning finding in the right breast and underwent surgical excision that confirmed DCIS.  Her surgery took place without complication.  She is here today for evaluation and recommendations for further therapy.         Past Medical History:    Anxiety state, unspecified    Attention deficit disorder    Breast CA (HCC)    Breast mass, left    has had mammo, ultrasound, biopsy    Cancer (HCC)    left breast    Colon adenoma    x1    Depression    High cholesterol    Hypercholesterolemia with hypertriglyceridemia    Morbid obesity with BMI of 40.0-44.9, adult (HCC)    Vitamin D deficiency     Past Surgical History:   Procedure Laterality Date    Benign biopsy left      Benign biopsy right             delivery only N/A     ,     Colonoscopy  2022    Colonoscopy N/A 2022    Procedure: COLONOSCOPY;  Surgeon: Kt Bailey MD;  Location: [facility]    Lumpectomy left Left 10/29/2018    Tiffany biopsy stereo nodule 2 site bilat (cpt=[id number])      negative    Needle biopsy left  10/08/2018    Needle biopsy right  10/08/2018    Radiation left  2019    Sinus surgery         Tonsillectomy     Left Breast Stereotactic biopsy in 3/2014-Benign    Gynecological History:  Pt is a   Pt was 31 years old at time of first pregnancy.     She has a cumulative breastfeeding history of 4 months, last many years ago.  She achieved menarche at age 13 and LMP     She has a history of oral contraceptive use for 20 years, last in .  She denies infertility treatment to achieve pregnancy.    Medications:     Current Outpatient Medications on File Prior to Visit   Medication Sig Dispense Refill    CUSTOM MEDICATION Semaglutide/ cyanocobalamin    1 mg -   Concentration: 5 mg/ 0.5 mL   Amount: 1 ML vial   Instructions: Injection 0.2 mL/ 20 units sq weekly 1 each 5    Phentermine HCl 15 MG Oral Cap Take 1 capsule (15 mg total) by mouth every morning. 30 capsule 5    Dexmethylphenidate HCl ER 25 MG Oral Capsule SR 24 Hr Take 1 capsule (25 mg total) by mouth every morning.      Nystatin 038416 UNIT/GM External Powder Apply 1 Application topically 4 (four) times daily. 60 g 0    ALPRAZolam 0.5 MG Oral Tab Take 1 tablet (0.5 mg total) by mouth.      VRAYLAR 3 MG Oral Cap 3 mg.      sertraline 100 MG Oral Tab Take 1.5 tablets (150 mg total) by mouth in the morning.      ondansetron (ZOFRAN) 4 mg tablet Take 1 tablet (4 mg total) by mouth every 8 (eight) hours as needed for Nausea. 20 tablet 2    traZODone HCl 100 MG Oral Tab Take 2 tablets (200 mg total) by mouth. At Bedtime  1    Multiple Vitamin (MULTIVITAMINS) Oral Cap Take 1 capsule by mouth in the morning.      HYDROcodone-acetaminophen 5-325 MG Oral Tab Take 1-2 tablets by mouth every 6 (six) hours as needed for Pain. (Patient not taking: Reported on 2025) 20 tablet 0     No current facility-administered medications on file prior to visit.       Allergies:     Allergies   Allergen Reactions    Bactrim [Sulfamethoxazole W/Trimethoprim] HIVES    Latex HIVES and RASH       Family History    Problem  Relation  Age of Onset      Heart Disorder  Father         Diabetes  Father        Hypertension  Father        Breast Cancer  Paternal Grandmother  43        d. 70      Breast Cancer  Sister  47      Cancer  Maternal Uncle  80        unknown primary      Cancer  Maternal Grandfather  70        prostate/bladder ca    She is not of Ashkenazi Hindu ancestry.    Social History:    Alcohol Use:  Yes    Comment:  socially        Smoking status:  Former Smoker  0.50 Packs/Day  For 10.00 Years    Types:  Cigarettes    Smokeless tobacco:  Never Used    She is currently unemployed. She moved to [facility] from [place] in July 2013 with her 2 daughters. She is  and was living in [place] previously.     Review of Systems:  General:    The patient denies, fever, chills, night sweats, fatigue, generalized weakness, change in appetite or weight loss.    HEENT:      The patient denies eye irritation, cataracts, redness, glaucoma, yellowing of the eyes, change in vision or color blindness. The patient denies hearing loss, ringing in the ears, ear drainage, earaches, nasal congestion, nose bleeds, snoring, pain in mouth/throat, hoarseness, change in voice, facial trauma.    Respiratory:  The patient denies chronic cough, phlegm, hemoptysis, pleurisy/chest pain, pneumonia, asthma, wheezing, difficulty in breathing with exertion, emphysema, chronic bronchitis, shortness of breast or abnormal sound when breathing.     Cardiovascular:  There is no history of chest pain, chest pressure/discomfort, palpitations, irregular heartbeat, fainting or near-fainting, difficulty breathing when lying flat, SOB/Coughing at night, swelling of the legs or chest pain while walking.    Breasts:  See history of present illness    Gastrointestinal:      There is no history of difficulty or pain with swallowing, reflux symptoms, vomiting, dark or bloody stools, constipation, yellowing of the skin, indigestion, nausea, change in bowel habits, diarrhea, abdominal pain or vomiting blood.      Genitourinary:  The patient denies frequent urination, needing to get up at night to urinate, urinary hesitancy or retaining urine, painful urination, urinary incontinence, decreased urine stream, blood in the urine or vaginal/penile discharge.    Skin:     The patient denies rash, itching, skin lesions, dry skin, change in skin color or change in moles.     Hematologic/Lymphatic:  The patient denies easily bruising or bleeding or persistent swollen glands or lymph nodes.     Musculoskeletal:  The patient denies muscle aches/pain, joint pain, stiff joints, neck pain, back pain or bone pain.    Neuropsychiatric:  There is no history of migraines or severe headaches, seizure/epilepsy, speech problems, coordination problems, trembling/tremors, fainting/black outs, dizziness, memory problems, loss of sensation/numbness, problems walking, weakness, tingling or burning in hands/feet. There is no history of abusive relationship, bipolar disorder, sleep disturbance, depression or feeling of despair. +anxiety    Endocrine:     There is no history of poor/slow wound healing, weight loss/gain, fertility or hormone problems, cold intolerance, thyroid disease.     Allergic/Immunologic:  There is no history of hives, hay fever, angioedema or anaphylaxis.    BP (!) 157/92 (BP Location: Left arm, Patient Position: Sitting, Cuff Size: large)   Pulse 96   Resp 16   Wt 117.9 kg (260 lb)   LMP 05/15/2025   SpO2 96%   BMI 41.34 kg/m²     Physical Examination:   This is a well-nourished, alert and oriented woman. There is not palpable cervical, supraclavicular or axillary adenopathy.  The neck is supple with a midline trachea and no thyromegaly. Range of motion is good at both shoulders. Breasts are symmetrical. The tumorectomy site is in the upper outer left  breast and shows no evidence of local recurrence. Both nipples are everted with no discharge. There is not dominant masses, focal nodularity or skin changes on either  side. The abdomen is soft, flat and nontender, with no palpable masses or organomegaly.    Assessment:  55 y/o F with h/o benign Left breast biopsy and an increased risk for breast cancer based on multiple risk assessment models with diagnosis of left breast cancer status post lumpectomy now status post lumpectomy for right breast DCIS upgraded from atypia.       Discussion and Plan:  I had a discussion with the patient regarding her breast exam. On exam today I found her to be healing well since her surgery with no signs of clinical concern.  I reviewed the pathology that upgraded her to an area of 5 mm of DCIS from the atypia.  Final margins are negative but there is a 1 mm margin from the anterior.  Her posterior fascia was taken and therefore is not considered a concern.  I discussed that given the multifocal area of this it would be very difficult to reexcised her and therefore we discussed the possibility of a completion mastectomy versus consultation with medical and radiation oncology for further risk reducing strategies to help prevent local regional recurrence.  She is motivated to avoid a mastectomy and wishes to discuss this further with the radiation and medical oncology teams.  Will require close imaging surveillance including a bilateral diagnostic evaluation in 6 months with a clinical exam to follow.   I encouraged her to continue monitoring her ROM and strength and explained that a referral to physical therapy may be warranted in the future if she identifies any limitations or restrictions. She was encouraged to contact the office with any questions or concerns prior to her next appointment.

## 2025-07-02 ENCOUNTER — OFFICE VISIT (OUTPATIENT)
Age: 55
End: 2025-07-02
Attending: INTERNAL MEDICINE
Payer: COMMERCIAL

## 2025-07-02 VITALS
WEIGHT: 259 LBS | RESPIRATION RATE: 18 BRPM | BODY MASS INDEX: 41.13 KG/M2 | OXYGEN SATURATION: 94 % | TEMPERATURE: 99 F | HEART RATE: 80 BPM | HEIGHT: 66.5 IN | DIASTOLIC BLOOD PRESSURE: 88 MMHG | SYSTOLIC BLOOD PRESSURE: 144 MMHG

## 2025-07-02 DIAGNOSIS — D05.11 BREAST NEOPLASM, TIS (DCIS), RIGHT: Primary | ICD-10-CM

## 2025-07-02 RX ORDER — TAMOXIFEN CITRATE 20 MG/1
20 TABLET ORAL DAILY
Qty: 90 TABLET | Refills: 3 | Status: SHIPPED | OUTPATIENT
Start: 2025-07-02

## 2025-07-02 NOTE — PROGRESS NOTES
HPI     Chelsey Barroso is a 54 year old female here for f/u of   Encounter Diagnosis   Name Primary?    Breast neoplasm, Tis (DCIS), right Yes     History of Present Illness  Chelsey Barroso is a 54 year old female with a new diagnosis of right-sided DCIS of the breast who presents for follow-up.    Right breast ductal carcinoma in situ (dcis) and atypical ductal hyperplasia (adh)  - New diagnosis of right-sided DCIS, cribriform nuclear grade 1, confirmed on June 16, 2025, after two-site excision of the right breast  - Multifocal ADH identified in the right breast on excision  - Excisional margins negative for carcinoma  - DCIS portion is estrogen receptor positive at 99%  - Initial MRI on March 22, 2025, showed non-mass enhancement in the central to upper central right breast and a stable, benign-appearing oval mass in the outer posterior right breast  - Biopsy on March 26, 2025, revealed PASH changes and ADH in the right breast  - No abnormal enhancement in the left breast on MRI    History of left breast invasive ductal carcinoma (IDC)  - Completed adjuvant tamoxifen therapy in February 2024  - No evidence of recurrence on recent imaging    Menstrual and gynecologic history  - Menarche at age 13 with irregular periods until starting birth control at age 20  - Currently still menstruating, with last period occurring recently  - No regular gynecologist; follows up with Doctor Ray for exams  - Mother had late onset of menopause    Medication tolerance and adverse effects  - Currently taking sertraline  - Tolerated tamoxifen well during prior therapy    Weight management  - Actively working on weight management  - Recent weight loss despite stress-related eating      ECOG PS 0    Review of Systems:   Review of Systems - Oncology  Pertinent positives per HPI        Current Outpatient Medications   Medication Sig Dispense Refill    CUSTOM MEDICATION Semaglutide/ cyanocobalamin    1 mg -   Concentration: 5 mg/  0.5 mL   Amount: 1 ML vial   Instructions: Injection 0.2 mL/ 20 units sq weekly 1 each 5    Phentermine HCl 15 MG Oral Cap Take 1 capsule (15 mg total) by mouth every morning. 30 capsule 5    Dexmethylphenidate HCl ER 25 MG Oral Capsule SR 24 Hr Take 1 capsule (25 mg total) by mouth every morning.      Nystatin 640313 UNIT/GM External Powder Apply 1 Application topically 4 (four) times daily. 60 g 0    ALPRAZolam 0.5 MG Oral Tab Take 1 tablet (0.5 mg total) by mouth.      VRAYLAR 3 MG Oral Cap 3 mg.      sertraline 100 MG Oral Tab Take 1.5 tablets (150 mg total) by mouth in the morning.      ondansetron (ZOFRAN) 4 mg tablet Take 1 tablet (4 mg total) by mouth every 8 (eight) hours as needed for Nausea. 20 tablet 2    traZODone HCl 100 MG Oral Tab Take 2 tablets (200 mg total) by mouth. At Bedtime  1    Multiple Vitamin (MULTIVITAMINS) Oral Cap Take 1 capsule by mouth in the morning.      HYDROcodone-acetaminophen 5-325 MG Oral Tab Take 1-2 tablets by mouth every 6 (six) hours as needed for Pain. (Patient not taking: Reported on 2025) 20 tablet 0     Allergies:   Allergies   Allergen Reactions    Bactrim [Sulfamethoxazole W/Trimethoprim] HIVES    Latex HIVES and RASH       Past Medical History:    Anxiety state, unspecified    Attention deficit disorder    Breast CA (HCC)    Breast mass, left    has had mammo, ultrasound, biopsy    Cancer (HCC)    left breast    Colon adenoma    x1    Depression    High cholesterol    Hypercholesterolemia with hypertriglyceridemia    Morbid obesity with BMI of 40.0-44.9, adult (HCC)    Vitamin D deficiency     Past Surgical History:   Procedure Laterality Date    Benign biopsy left      Benign biopsy right             delivery only N/A     ,     Colonoscopy  2022    Colonoscopy N/A 2022    Procedure: COLONOSCOPY;  Surgeon: Kt Bailey MD;  Location: [facility]    Lumpectomy left Left 10/29/2018    Tiffany biopsy stereo nodule 2 site  sara (cpt=[id number])  2014    negative    Needle biopsy left  10/08/2018    Needle biopsy right  10/08/2018    Radiation left  2019    Sinus surgery        Tonsillectomy       Social History     Socioeconomic History    Marital status:    Tobacco Use    Smoking status: Former     Current packs/day: 0.00     Average packs/day: 0.5 packs/day for 10.0 years (5.0 ttl pk-yrs)     Types: Cigarettes     Start date: 10/24/1991     Quit date: 10/24/2001     Years since quittin.7    Smokeless tobacco: Never   Vaping Use    Vaping status: Never Used   Substance and Sexual Activity    Alcohol use: Yes     Comment: socially    Drug use: No     Comment: never   Other Topics Concern    Caffeine Concern Yes     Comment: 1-2 cups of coffee       Family History   Problem Relation Age of Onset    Breast Cancer Self     Other (Other) Mother         cushings syndrome    Heart Disorder Father     Diabetes Father     Hypertension Father     Breast Cancer Sister 47    Breast Cancer Sister 51    Cancer Maternal Grandfather 70        prostate/bladder ca    Breast Cancer Paternal Grandmother 43        d. 70    Cancer Maternal Uncle 80        unknown primary         PHYSICAL EXAM:    /88 (BP Location: Left arm, Patient Position: Sitting, Cuff Size: large)   Pulse 80   Temp 99 °F (37.2 °C) (Tympanic)   Resp 18   Ht 1.689 m (5' 6.5\")   Wt 117.5 kg (259 lb)   LMP 05/15/2025   SpO2 94%   BMI 41.18 kg/m²   Wt Readings from Last 6 Encounters:   25 117.5 kg (259 lb)   25 117.9 kg (260 lb)   25 120.2 kg (265 lb)   25 117.9 kg (260 lb)   25 120.7 kg (266 lb)   25 122 kg (269 lb)     General: Patient is alert, not in acute distress.  HEENT: EOMs intact. PERRL.   Psych/Depression: nl        ASSESSMENT/PLAN:     Encounter Diagnosis   Name Primary?    Breast neoplasm, Tis (DCIS), right Yes     Assessment & Plan  Ductal carcinoma in situ of right breast, estrogen receptor positive  DCIS of  the right breast, estrogen receptor positive, identified following upstaging from atypical ductal hyperplasia. Multifocal DCIS complicates re-excision. She prefers to avoid mastectomy and seeks medical and radiation oncology consultations. Tamoxifen is considered for risk reduction, but its efficacy is questioned due to prior use and current menstruation status. Anastrozole is unsuitable due to her premenopausal status.  - Refer to radiation oncology for consultation with Dr. Cross on July 8.  - Initiate tamoxifen therapy immediately, as she is familiar with the medication and has tolerated it well in the past.  - Continue surveillance imaging with mammograms and MRIs as scheduled.    Atypical ductal and lobular hyperplasia of right breast, excised  ADH and ALH were excised from the right breast. ADH was upstaged to DCIS upon excision. Excision was complete with negative margins.    Pseudoangiomatous stromal hyperplasia and fibroadenoma of right breast, excised  PASH and fibroadenoma were excised from the right breast. These findings were benign, with no further intervention required post-excision.    ER-positive invasive ductal carcinoma of left breast, status post treatment  ER-positive invasive ductal carcinoma of the left breast, previously treated with tamoxifen, completed in February 2024. She is under surveillance for recurrence and new primary breast cancer.  - Continue surveillance imaging with mammograms and MRIs as scheduled.    Obesity  Obesity management is ongoing. She has lost weight but experienced some weight gain due to stress eating related to cancer diagnosis. She is working with Dr. Ponce and plans to resume semaglutide therapy post-surgery.  - Resume semaglutide therapy for weight management.  - Continue working with Dr. Ponce on weight loss program, including diet and exercise.    Depression  Depression is managed with sertraline, which she reports is effective. Potential interaction with  tamoxifen exists, but the current regimen is deemed sufficient for therapeutic benefit.  - Continue sertraline therapy as it is effective for managing depression.    Recording duration: 27 minutes      No orders of the defined types were placed in this encounter.    The following individual(s) Chelsey Barroso, verbally consented to be recorded using Ambient AI technology and understand that they can withdraw their consent to listening technology at any point by asking the clinician to turn off or pause the recording.       Results From Past 48 Hours:  No results found for this or any previous visit (from the past 48 hours).    [facility]    Imaging & Referrals:  None   No orders of the defined types were placed in this encounter.    Results  RADIOLOGY  Breast MRI: Category B breast densities. Right breast: non-mass enhancement in central to upper central right breast measuring 8.3 x 5.6 x 5 cm in AP dimensions. Outer posterior right breast: unchanged oval mass compared to multiple prior examinations, stable long term, felt to be benign. Left breast: no abnormal breast enhancement. No axillary, mammary, or internal mammary chain lymph nodes noted on either breast. (03/22/2025)    PATHOLOGY  Right Breast Biopsy: Site 1: PASH changes. Site 2: Atypical ductal hyperplasia. (03/26/2025)  Right Breast Lumpectomy: Anterior lumpectomy: DCIS, cribriform, nuclear grade 1, measuring 5 mm, multifocal ADH, ALH, PASH. All inked excisional margins negative for carcinoma. Nearest imaging posterior margin 1 mm from DCIS. Posterior lumpectomy: fibroadenoma, areas of atypical ductal hyperplasia, PASH. DCIS portion estrogen receptor positive 99%. (06/16/2025)

## 2025-07-02 NOTE — PATIENT INSTRUCTIONS
VISIT SUMMARY:  Today, we discussed your recent diagnosis of right-sided ductal carcinoma in situ (DCIS) and reviewed your treatment plan. We also addressed your history of left breast invasive ductal carcinoma, weight management, and depression.    YOUR PLAN:  -DUCTAL CARCINOMA IN SITU (DCIS) OF RIGHT BREAST, ESTROGEN RECEPTOR POSITIVE: DCIS is a non-invasive breast cancer where abnormal cells are found in the lining of a breast duct but have not spread outside the duct. We will start you on tamoxifen therapy immediately, as you have tolerated it well in the past. You will also have a consultation with Dr. Cross in radiation oncology on July 8. Continue with your scheduled mammograms and MRIs for surveillance.    -ATYPICAL DUCTAL AND LOBULAR HYPERPLASIA OF RIGHT BREAST, EXCISED: Atypical ductal hyperplasia (ADH) and atypical lobular hyperplasia (ALH) are conditions where there are abnormal cells in the breast ducts or lobules. These were excised, and no further intervention is needed as the margins were clear.    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 These were excised, and no further treatment is required.    -ER-POSITIVE INVASIVE DUCTAL CARCINOMA OF LEFT BREAST, STATUS POST TREATMENT: ER-positive invasive ductal carcinoma is a type of breast cancer that grows in the milk ducts and is fueled by estrogen. You completed tamoxifen therapy in February 2024. Continue with your scheduled mammograms and MRIs for surveillance.    -OBESITY: Obesity is a condition where excess body fat may affect your health. You have been working on weight management and will resume semaglutide therapy after your surgery. Continue working with Dr. Ponce on your weight loss program, including diet and exercise.    -DEPRESSION: Depression is a mood disorder that causes persistent feelings of sadness and loss of interest. You are currently  managing it with sertraline, which is effective. Continue with your current sertraline therapy.    INSTRUCTIONS:  1. Start tamoxifen therapy immediately.  2. Attend your radiation oncology consultation with Dr. Cross on July 8.  3. Continue with your scheduled mammograms and MRIs for surveillance.  4. Resume semaglutide therapy for weight management after your surgery.  5. Continue working with Dr. Ponce on your weight loss program.  6. Continue taking sertraline for depression management.    Contains text generated by Flo

## 2025-07-08 ENCOUNTER — SOCIAL WORK SERVICES (OUTPATIENT)
Age: 55
End: 2025-07-08

## 2025-07-08 ENCOUNTER — OFFICE VISIT (OUTPATIENT)
Dept: RADIATION ONCOLOGY | Facility: HOSPITAL | Age: 55
End: 2025-07-08
Attending: RADIOLOGY
Payer: COMMERCIAL

## 2025-07-08 VITALS
RESPIRATION RATE: 18 BRPM | DIASTOLIC BLOOD PRESSURE: 90 MMHG | OXYGEN SATURATION: 95 % | SYSTOLIC BLOOD PRESSURE: 146 MMHG | TEMPERATURE: 98 F | HEART RATE: 81 BPM

## 2025-07-08 PROCEDURE — 99212 OFFICE O/P EST SF 10 MIN: CPT

## 2025-07-08 NOTE — PROGRESS NOTES
SW informed of pt distress screen of 6.  Pt refusing SW services at this time.  Pt aware of SW contact if needed.    Codie MORALES MSW, LCSW  [facility] Licensed Clinical

## 2025-07-08 NOTE — CONSULTS
RADIATION ONCOLOGY NOTE    DATE OF VISIT: 7/8/2025    DIAGNOSIS :  DCIS of right breast, low grade, s/p lumpectomy, receptor positive, for adjuvant tx  2) also history of Stage IA pT1a, NO(sn)M0, G2, ER: Positive, NM: Positive, HER2: Negative), s/p lumpectomy s/p adjuvant XRT on 1/24/2019.    Dear Vinay Ojeda Ayub and colleagues,         As you recall, pt is a pleasant now 55 yo female, know to me from previous BCT of left breast at age 48 for early stage L breast ca.  She has a family history but previous genetics was negative.  Pt now has right-sided DCIS of the breast presenting with ADH on excision.    DCIS, was cribriform nuclear grade 1, on June 16, 2025, after two-site excision of the right breast.  Pt had multifocal ADH identified in the right breast on excision, excisional margins negative for carcinoma.   DCIS margins were close but negative and portion is estrogen receptor positive at 99%.  MRI on March 22, 2025, showed non-mass enhancement in the central to upper central right breast and a stable, benign-appearing oval mass in the outer posterior right breast.  Pt tolerated tamoxifen well during prior therapy.  Pt recovering well, no issues from surgery.      [facility] SURGICAL SPECIMEN RIGHT (CPT=76098)  Result Date: 6/16/2025  CONCLUSION: The specimen radiographs shows the hourglass and both stoplight clips to be within the specimens, with the localization wires in place.  Dr. Ailyn Williamson was informed of the successful excision.     Dictated by (CST): Vijay Mcfarlane MD on 6/16/2025 at 3:25 PM     Finalized by (CST): Vijay Mfcarlane MD on 6/16/2025 at 3:27 PM          MARISOL LOCALIZATION WIRE 2 SITE RIGHT (CPT=19281/76176)  Result Date: 6/16/2025  CONCLUSION: Successful bracketing hookwire localization of the biopsy-proven ADH in the upper outer right breast.      Dictated by (CST): Vijay Mcfarlane MD on 6/16/2025 at 9:56 AM     Finalized by (CST): Vijay Mcfarlane MD on 6/16/2025 at 10:01  AM            ROS    A 12 Point review of system was obtained and is as above and per HPI and nursing note.    Review of Systems   Constitutional: Negative.    HENT: Negative.     Eyes: Negative.    Respiratory: Negative.     Cardiovascular: Negative.    Gastrointestinal: Negative.    Endocrine: Negative.    Genitourinary: Negative.    Musculoskeletal:         Generalized joint pain. Takes tylenol as needed.        Skin: Negative.    Allergic/Immunologic: Negative.    Neurological: Negative.    Hematological: Negative.    Psychiatric/Behavioral: Negative.           Current Medications[1]    PAIN:   , Pain Score: 0,     ,  ,     ,  ,      ALLERGIES :   Allergies[2]    PAST MEDICAL HISTORY:   has a past medical history of Anxiety state, unspecified, Attention deficit disorder, Breast CA (HCC), Breast mass, left, Cancer (HCC) (10/29/2018), Colon adenoma (2022), Depression, High cholesterol, Hypercholesterolemia with hypertriglyceridemia, Morbid obesity with BMI of 40.0-44.9, adult (HCC), and Vitamin D deficiency.    She has no past medical history of Anesthesia complication, Deep vein thrombosis (HCC), Diabetes (HCC), Difficult intubation, Family history of malignant hyperthermia, Family history of pseudocholinesterase deficiency, High blood pressure, History of adverse reaction to anesthesia, History of blood transfusion, motion sickness, Malignant hyperthermia, PONV (postoperative nausea and vomiting), Prediabetes, Pseudocholinesterase deficiency, Pulmonary embolism (HCC), Sleep apnea, or Type 1 diabetes mellitus (HCC).    PAST SURGICAL HISTORY:   has a past surgical history that includes ; tonsillectomy; josee biopsy stereo nodule 2 site bilat (cpt=19081/85907) () (negative); sinus surgery; benign biopsy left; benign biopsy right; lumpectomy left (Left, 10/29/2018); needle biopsy left (10/08/2018); needle biopsy right (10/08/2018); radiation left (2019); colonoscopy (2022); colonoscopy  (N/A, 2022) (Procedure: COLONOSCOPY;  Surgeon: Kt Bailey MD;  Location: [facility] ENDOSCOPY);  delivery only (N/A) (, ); and other surgical history (2025) (right Anterior lumpectomy site with visible biopsy clip and posterior lumpectomy site with visible biopsy clip).    PAST SOCIAL HISTORY   reports that she quit smoking about 23 years ago. Her smoking use included cigarettes. She started smoking about 33 years ago. She has a 5 pack-year smoking history. She has never used smokeless tobacco. She reports current alcohol use. She reports that she does not use drugs.    PAST FAMILY HISTORY   family history includes Breast Cancer in her self; Breast Cancer (age of onset: 43) in her paternal grandmother; Breast Cancer (age of onset: 47) in her sister; Breast Cancer (age of onset: 51) in her sister; Cancer (age of onset: 70) in her maternal grandfather; Cancer (age of onset: 80) in her maternal uncle; Diabetes in her father; Heart Disorder in her father; Hypertension in her father; Other in her mother.    Wt Readings from Last 6 Encounters:   25 117.5 kg (259 lb)   25 117.9 kg (260 lb)   25 120.2 kg (265 lb)   25 117.9 kg (260 lb)   25 120.7 kg (266 lb)   25 122 kg (269 lb)        PHYSICAL EXAM  Blood pressure 146/90, pulse 81, temperature 97.9 °F (36.6 °C), temperature source Temporal, resp. rate 18, last menstrual period 05/15/2025, SpO2 95%, not currently breastfeeding.  PERFORMANCE STATUS  0, denies PAIN  GENERAL:  Pt is alert and oriented times three in no acute distress.    HEENT:  PERRLA, EOMI,   NECK:  Supple with no  lymphadenopathy.   CHEST:  Clear   S/p R lumpectomy.  Prev L breast surgery and xrt.  ABDOMEN:  Soft with no masses.   EXTREMITIES:  There is no upper or lower extremity edema.    NEUROLOGIC:  Cranial nerves II-XII are intact.  Neuro exam is nonfocal.    COMPLETED TESTS:  I have reviewed the patient's clinical, radiographic,  pathologic and laboratory studies.    ASSESSMENT/PLAN     DCIS of right breast, low grade, s/p lumpectomy, receptor positive, for adjuvant tx.     Pt had multifocal ADH identified in the right breast on excision, excisional margins negative for carcinoma.   DCIS margins were close but negative and portion is estrogen receptor positive at 99%.       Pt tolerated tamoxifen well during prior therapy.  Pt recovering well, no issues from surgery.      I have explained the diagnosis, stage, natural history of the disease and the goals of treatment.   The rational, alternatives and all risk were discussed and all questions were answered.    At this time the patient would like to proceed with a course of treatment.  Therefore, I will plan a XRT mapping session shortly and will keep you updated.      Thank you for allowing me to take care of your patient.  Please do not hesitate to contact me directly.    Reinaldo Cross MD, FACRO  Radiation Oncology  [email or web address]  [phone]    7/8/2025                  [1]   Current Outpatient Medications   Medication Sig Dispense Refill    HYDROcodone-acetaminophen 5-325 MG Oral Tab Take 1-2 tablets by mouth every 6 (six) hours as needed for Pain. 20 tablet 0    CUSTOM MEDICATION Semaglutide/ cyanocobalamin    1 mg -   Concentration: 5 mg/ 0.5 mL   Amount: 1 ML vial   Instructions: Injection 0.2 mL/ 20 units sq weekly 1 each 5    Dexmethylphenidate HCl ER 25 MG Oral Capsule SR 24 Hr Take 1 capsule (25 mg total) by mouth every morning.      Nystatin 007434 UNIT/GM External Powder Apply 1 Application topically 4 (four) times daily. 60 g 0    ALPRAZolam 0.5 MG Oral Tab Take 1 tablet (0.5 mg total) by mouth.      VRAYLAR 3 MG Oral Cap 3 mg.      sertraline 100 MG Oral Tab Take 1.5 tablets (150 mg total) by mouth in the morning.      traZODone HCl 100 MG Oral Tab Take 2 tablets (200 mg total) by mouth. At Bedtime  1    Multiple Vitamin (MULTIVITAMINS) Oral Cap Take 1 capsule by mouth in the  morning.      tamoxifen 20 MG Oral Tab Take 1 tablet (20 mg total) by mouth daily. (Patient not taking: Reported on 7/8/2025) 90 tablet 3    Phentermine HCl 15 MG Oral Cap Take 1 capsule (15 mg total) by mouth every morning. (Patient not taking: Reported on 7/8/2025) 30 capsule 5    ondansetron (ZOFRAN) 4 mg tablet Take 1 tablet (4 mg total) by mouth every 8 (eight) hours as needed for Nausea. (Patient not taking: Reported on 7/8/2025) 20 tablet 2   [2]   Allergies  Allergen Reactions    Bactrim [Sulfamethoxazole W/Trimethoprim] HIVES    Latex HIVES and RASH

## 2025-07-08 NOTE — PATIENT INSTRUCTIONS
- you will receive a call to schedule your CT mapping scan    - please call [phone] with radiation questions.

## 2025-07-08 NOTE — PROGRESS NOTES
Nursing Consultation Note  Patient: Chelsey Barroso  YOB: 1970  Age: 54 year old  Radiation Oncologist: Dr. Reinaldo Cross  Referring Physician: Ailyn Williamson  Diagnosis:[unfilled]  Consult Date: 7/8/2025      Chemotherapy: N/A  Labs: Reviewed  Imaging: Reviewed  Is the patient of child-bearing age?         Yes   Female: LMP: June 2025  Has egg harvesting been discussed? Not Applicable ... Is there any possibility that the patient is pregnant?   No  - will sign pregnancy waiver  Has the patient received radiation therapy in the past? yes- L Breast RT in 2019 with Dr. Cross  Does the patient have an implantable device?No   Patient has/has had:     1. Assistive Devices: N/A    2. Flu Vaccination: no-referral to ask PCP    3. Pneumonia Vaccination:  no--referral to ask PCP    Vital Signs:   Vitals:    07/08/25 1234   BP: 146/90   Pulse: 81   Resp: 18   Temp: 97.9 °F (36.6 °C)   ,   Wt Readings from Last 6 Encounters:   07/02/25 117.5 kg (259 lb)   06/25/25 117.9 kg (260 lb)   06/16/25 120.2 kg (265 lb)   04/16/25 117.9 kg (260 lb)   03/12/25 120.7 kg (266 lb)   02/25/25 122 kg (269 lb)       Nursing Note:      Review of Systems   Constitutional: Negative.    HENT: Negative.     Eyes: Negative.    Respiratory: Negative.     Cardiovascular: Negative.    Gastrointestinal: Negative.    Endocrine: Negative.    Genitourinary: Negative.    Musculoskeletal:         Generalized joint pain. Takes tylenol as needed.        Skin: Negative.    Allergic/Immunologic: Negative.    Neurological: Negative.    Hematological: Negative.    Psychiatric/Behavioral: Negative.            Allergies:  Allergies[1]    Current Medications[2]    Preferred Pharmacy:    [place], [place] - [address] [address] [phone], [phone]  [address]  [facility] [address]  [facility] [address]  Phone: [phone] Fax: [phone]    [place], [place] - [address] [facility] [phone],  [phone]  [address] [facility] [address]  Phone: [phone] Fax: [phone]    [id number] - [place], [place] - [address] [phone], [phone]  [address] [place] [address]  Phone: [phone] Fax: [phone]      Past Medical History[3]    Past Surgical History[4]    Social History     Socioeconomic History    Marital status:      Spouse name: Not on file    Number of children: Not on file    Years of education: Not on file    Highest education level: Not on file   Occupational History    Not on file   Tobacco Use    Smoking status: Former     Current packs/day: 0.00     Average packs/day: 0.5 packs/day for 10.0 years (5.0 ttl pk-yrs)     Types: Cigarettes     Start date: 10/24/1991     Quit date: 10/24/2001     Years since quittin.7    Smokeless tobacco: Never   Vaping Use    Vaping status: Never Used   Substance and Sexual Activity    Alcohol use: Yes     Comment: socially    Drug use: No     Comment: never    Sexual activity: Not on file   Other Topics Concern     Service Not Asked    Blood Transfusions Not Asked    Caffeine Concern Yes     Comment: 1-2 cups of coffee    Occupational Exposure Not Asked    Hobby Hazards Not Asked    Sleep Concern Not Asked    Stress Concern Not Asked    Weight Concern Not Asked    Special Diet Not Asked    Back Care Not Asked    Exercise Not Asked    Bike Helmet Not Asked    Seat Belt Not Asked    Self-Exams Not Asked   Social History Narrative    Not on file     Social Drivers of Health     Food Insecurity: Not on file   Transportation Needs: Not on file   Housing Stability: Not on file       ECOG:  Grade 0 - Fully active, able to carry on all predisease activities without restrictions.      Education:  yes    Are ADL's met?  Yes  Does patient feel safe in their environment?  Yes  Care decisions:  Patient and/or surrogate IS involved in care decisions.  Advanced directives:  Patient DOES NOT have advanced  directives.  Transportation:  Adequate transportation available for expected visits    Pain:   ;Pain Score: 0   ;    ;       Primary language:  English  Language line required?  no  Comprehension Ability:  good  Able to read?  yes  Able to write?  yes  Communication tools:  none  Patient's ability to learn:  good  Readiness to learn:  Motivated  Learning preferences:  Discussion and Handout  Barriers to learning:  None  Interventions to reduce barriers:  Consult, Face patient when speaking, Provide support/encouragement, Provide printed materials, and Patient to express feelings  Knowledge Deficit Plan Of Care:    Problem:  Knowledge Deficit    Problems related to:    Radiation therapy    Interventions:  Assess patient knowledge level  Assess knowledge needs  Instruct on treatment planning  Instruct on radiation therapy appointment scheduling  Instruct on basic skin care  Instruct on side effects of radiation therapy    Expected Outcomes:  Knowledge of care plan  Knowledge of radiation therapy  Knowledge of side effects of radiation therapy and management  Comfortable with knowledge level    Progress Toward Outcome:  Making progress    Pamphlets/Handouts Given to Patient:  Understanding radiation therapy  Radiation process overview    Patient seen for consultation with Dr. Cross. Patient accompanied by her sister. VSS, patient denies pain currently. Healing well from surgery on 6/16/25. Has good ROM. Patient will start tamoxifen this week. Patient has history of L breast IDC- had L breast RT in 2019 with Dr. Cross. Annual breast MRI done on 3/22/25 showing concerning area in right breast. Biopsy done on 3/26/25 showing PASH and ADH. R Lumpectomy (2 site) done on 6/16/25, Diagnosis upgraded to DCIS. Close margins, patient electing to have RT. I explained to the patient that today she would meet Dr. Cross but while being treated there was a possibility that she might also encounter the physicians who cover for Dr. Cross which are  Dr. Kam, Dr. Milton, and Dr. Kavin Bassett. Patient educated on radiation process, skin care recommendations, and moisturizer use. Patient states her understanding. Consent signed, CT sim task sent. Patient provided with radiation RN number.                [1]   Allergies  Allergen Reactions    Bactrim [Sulfamethoxazole W/Trimethoprim] HIVES    Latex HIVES and RASH   [2]   Current Outpatient Medications   Medication Sig Dispense Refill    HYDROcodone-acetaminophen 5-325 MG Oral Tab Take 1-2 tablets by mouth every 6 (six) hours as needed for Pain. 20 tablet 0    CUSTOM MEDICATION Semaglutide/ cyanocobalamin    1 mg -   Concentration: 5 mg/ 0.5 mL   Amount: 1 ML vial   Instructions: Injection 0.2 mL/ 20 units sq weekly 1 each 5    Dexmethylphenidate HCl ER 25 MG Oral Capsule SR 24 Hr Take 1 capsule (25 mg total) by mouth every morning.      Nystatin [id number] UNIT/GM External Powder Apply 1 Application topically 4 (four) times daily. 60 g 0    ALPRAZolam 0.5 MG Oral Tab Take 1 tablet (0.5 mg total) by mouth.      VRAYLAR 3 MG Oral Cap 3 mg.      sertraline 100 MG Oral Tab Take 1.5 tablets (150 mg total) by mouth in the morning.      traZODone HCl 100 MG Oral Tab Take 2 tablets (200 mg total) by mouth. At Bedtime  1    Multiple Vitamin (MULTIVITAMINS) Oral Cap Take 1 capsule by mouth in the morning.      tamoxifen 20 MG Oral Tab Take 1 tablet (20 mg total) by mouth daily. (Patient not taking: Reported on 7/8/2025) 90 tablet 3    Phentermine HCl 15 MG Oral Cap Take 1 capsule (15 mg total) by mouth every morning. (Patient not taking: Reported on 7/8/2025) 30 capsule 5    ondansetron (ZOFRAN) 4 mg tablet Take 1 tablet (4 mg total) by mouth every 8 (eight) hours as needed for Nausea. (Patient not taking: Reported on 7/8/2025) 20 tablet 2   [3]   Past Medical History:   Anxiety state, unspecified    Attention deficit disorder    Breast CA (HCC)    Breast mass, left    has had mammo, ultrasound, biopsy    Cancer (HCC)    left  breast    Colon adenoma    x1    Depression    High cholesterol    Hypercholesterolemia with hypertriglyceridemia    Morbid obesity with BMI of 40.0-44.9, adult (HCC)    Vitamin D deficiency   [4]   Past Surgical History:  Procedure Laterality Date    Benign biopsy left      Benign biopsy right             delivery only N/A     ,     Colonoscopy  2022    Colonoscopy N/A 2022    Procedure: COLONOSCOPY;  Surgeon: Kt Bailey MD;  Location: [facility] ENDOSCOPY    Lumpectomy left Left 10/29/2018    Tiffany biopsy stereo nodule 2 site bilat (cpt=19081/81209)      negative    Needle biopsy left  10/08/2018    Needle biopsy right  10/08/2018    Radiation left  2019    Sinus surgery        Tonsillectomy

## 2025-07-16 ENCOUNTER — APPOINTMENT (OUTPATIENT)
Dept: RADIATION ONCOLOGY | Facility: HOSPITAL | Age: 55
End: 2025-07-16
Attending: RADIOLOGY
Payer: COMMERCIAL

## 2025-07-28 ENCOUNTER — OFFICE VISIT (OUTPATIENT)
Dept: RADIATION ONCOLOGY | Facility: HOSPITAL | Age: 55
End: 2025-07-28
Attending: RADIOLOGY
Payer: COMMERCIAL

## 2025-07-28 VITALS
HEART RATE: 92 BPM | OXYGEN SATURATION: 98 % | SYSTOLIC BLOOD PRESSURE: 145 MMHG | RESPIRATION RATE: 18 BRPM | TEMPERATURE: 98 F | DIASTOLIC BLOOD PRESSURE: 90 MMHG

## 2025-07-29 ENCOUNTER — TELEPHONE (OUTPATIENT)
Dept: RADIATION ONCOLOGY | Facility: HOSPITAL | Age: 55
End: 2025-07-29

## 2025-08-01 ENCOUNTER — APPOINTMENT (OUTPATIENT)
Dept: RADIATION ONCOLOGY | Facility: HOSPITAL | Age: 55
End: 2025-08-01
Attending: RADIOLOGY

## 2025-08-01 PROCEDURE — 77412 RADIATION TX DELIVERY LVL 3: CPT | Performed by: RADIOLOGY

## 2025-08-01 PROCEDURE — 77387 GUIDANCE FOR RADJ TX DLVR: CPT | Performed by: RADIOLOGY

## 2025-08-01 PROCEDURE — 77336 RADIATION PHYSICS CONSULT: CPT | Performed by: RADIOLOGY

## 2025-08-04 ENCOUNTER — OFFICE VISIT (OUTPATIENT)
Dept: RADIATION ONCOLOGY | Facility: HOSPITAL | Age: 55
End: 2025-08-04
Attending: RADIOLOGY

## 2025-08-04 VITALS
DIASTOLIC BLOOD PRESSURE: 93 MMHG | OXYGEN SATURATION: 97 % | HEART RATE: 85 BPM | SYSTOLIC BLOOD PRESSURE: 152 MMHG | RESPIRATION RATE: 16 BRPM | TEMPERATURE: 98 F

## 2025-08-04 PROCEDURE — 77412 RADIATION TX DELIVERY LVL 3: CPT | Performed by: RADIOLOGY

## 2025-08-04 PROCEDURE — 77387 GUIDANCE FOR RADJ TX DLVR: CPT | Performed by: RADIOLOGY

## 2025-08-05 PROCEDURE — 77412 RADIATION TX DELIVERY LVL 3: CPT | Performed by: RADIOLOGY

## 2025-08-05 PROCEDURE — 77387 GUIDANCE FOR RADJ TX DLVR: CPT | Performed by: RADIOLOGY

## 2025-08-06 PROCEDURE — 77387 GUIDANCE FOR RADJ TX DLVR: CPT | Performed by: RADIOLOGY

## 2025-08-06 PROCEDURE — 77412 RADIATION TX DELIVERY LVL 3: CPT | Performed by: RADIOLOGY

## 2025-08-07 PROCEDURE — 77387 GUIDANCE FOR RADJ TX DLVR: CPT | Performed by: RADIOLOGY

## 2025-08-07 PROCEDURE — 77412 RADIATION TX DELIVERY LVL 3: CPT | Performed by: RADIOLOGY

## 2025-08-08 PROCEDURE — 77336 RADIATION PHYSICS CONSULT: CPT | Performed by: RADIOLOGY

## 2025-08-08 PROCEDURE — 77412 RADIATION TX DELIVERY LVL 3: CPT | Performed by: RADIOLOGY

## 2025-08-08 PROCEDURE — 77387 GUIDANCE FOR RADJ TX DLVR: CPT | Performed by: RADIOLOGY

## 2025-08-11 ENCOUNTER — APPOINTMENT (OUTPATIENT)
Dept: RADIATION ONCOLOGY | Facility: HOSPITAL | Age: 55
End: 2025-08-11
Attending: RADIOLOGY

## 2025-08-11 ENCOUNTER — OFFICE VISIT (OUTPATIENT)
Dept: RADIATION ONCOLOGY | Facility: HOSPITAL | Age: 55
End: 2025-08-11
Attending: RADIOLOGY

## 2025-08-11 VITALS
RESPIRATION RATE: 18 BRPM | DIASTOLIC BLOOD PRESSURE: 86 MMHG | TEMPERATURE: 98 F | HEART RATE: 77 BPM | SYSTOLIC BLOOD PRESSURE: 145 MMHG | OXYGEN SATURATION: 98 %

## 2025-08-11 PROCEDURE — 77412 RADIATION TX DELIVERY LVL 3: CPT | Performed by: RADIOLOGY

## 2025-08-11 PROCEDURE — 77333 RADIATION TREATMENT AID(S): CPT | Performed by: RADIOLOGY

## 2025-08-11 PROCEDURE — 77290 THER RAD SIMULAJ FIELD CPLX: CPT | Performed by: RADIOLOGY

## 2025-08-11 RX ORDER — MOMETASONE FUROATE 1 MG/G
CREAM TOPICAL
Qty: 50 G | Refills: 2 | Status: SHIPPED | OUTPATIENT
Start: 2025-08-11

## 2025-08-12 PROCEDURE — 77387 GUIDANCE FOR RADJ TX DLVR: CPT | Performed by: RADIOLOGY

## 2025-08-12 PROCEDURE — 77412 RADIATION TX DELIVERY LVL 3: CPT | Performed by: RADIOLOGY

## 2025-08-13 PROCEDURE — 77295 3-D RADIOTHERAPY PLAN: CPT | Performed by: RADIOLOGY

## 2025-08-13 PROCEDURE — 77334 RADIATION TREATMENT AID(S): CPT | Performed by: RADIOLOGY

## 2025-08-13 PROCEDURE — 77300 RADIATION THERAPY DOSE PLAN: CPT | Performed by: RADIOLOGY

## 2025-08-13 PROCEDURE — 77412 RADIATION TX DELIVERY LVL 3: CPT | Performed by: RADIOLOGY

## 2025-08-13 PROCEDURE — 77387 GUIDANCE FOR RADJ TX DLVR: CPT | Performed by: RADIOLOGY

## 2025-08-14 PROCEDURE — 77387 GUIDANCE FOR RADJ TX DLVR: CPT | Performed by: RADIOLOGY

## 2025-08-14 PROCEDURE — 77412 RADIATION TX DELIVERY LVL 3: CPT | Performed by: RADIOLOGY

## 2025-08-15 PROCEDURE — 77412 RADIATION TX DELIVERY LVL 3: CPT | Performed by: RADIOLOGY

## 2025-08-15 PROCEDURE — 77336 RADIATION PHYSICS CONSULT: CPT | Performed by: RADIOLOGY

## 2025-08-15 PROCEDURE — 77387 GUIDANCE FOR RADJ TX DLVR: CPT | Performed by: RADIOLOGY

## 2025-08-18 ENCOUNTER — OFFICE VISIT (OUTPATIENT)
Dept: RADIATION ONCOLOGY | Facility: HOSPITAL | Age: 55
End: 2025-08-18
Attending: RADIOLOGY

## 2025-08-18 VITALS — TEMPERATURE: 98 F | OXYGEN SATURATION: 99 % | HEART RATE: 98 BPM | RESPIRATION RATE: 16 BRPM

## 2025-08-18 PROCEDURE — 77387 GUIDANCE FOR RADJ TX DLVR: CPT | Performed by: RADIOLOGY

## 2025-08-18 PROCEDURE — 77412 RADIATION TX DELIVERY LVL 3: CPT | Performed by: RADIOLOGY

## 2025-08-19 PROCEDURE — 77280 THER RAD SIMULAJ FIELD SMPL: CPT | Performed by: RADIOLOGY

## 2025-08-19 PROCEDURE — 77412 RADIATION TX DELIVERY LVL 3: CPT | Performed by: RADIOLOGY

## 2025-08-20 PROCEDURE — 77387 GUIDANCE FOR RADJ TX DLVR: CPT | Performed by: RADIOLOGY

## 2025-08-20 PROCEDURE — 77412 RADIATION TX DELIVERY LVL 3: CPT | Performed by: RADIOLOGY

## 2025-08-21 PROCEDURE — 77387 GUIDANCE FOR RADJ TX DLVR: CPT | Performed by: RADIOLOGY

## 2025-08-21 PROCEDURE — 77412 RADIATION TX DELIVERY LVL 3: CPT | Performed by: RADIOLOGY

## 2025-08-21 PROCEDURE — 77336 RADIATION PHYSICS CONSULT: CPT | Performed by: RADIOLOGY

## 2025-08-22 PROCEDURE — 77387 GUIDANCE FOR RADJ TX DLVR: CPT | Performed by: RADIOLOGY

## 2025-08-22 PROCEDURE — 77336 RADIATION PHYSICS CONSULT: CPT | Performed by: RADIOLOGY

## 2025-08-22 PROCEDURE — 77412 RADIATION TX DELIVERY LVL 3: CPT | Performed by: RADIOLOGY

## 2025-08-26 PROBLEM — N60.99 ATYPICAL DUCTAL HYPERPLASIA OF BREAST: Status: ACTIVE | Noted: 2025-08-26

## (undated) DEVICE — CAUTERY BLADE 2IN INS E1455

## (undated) DEVICE — LINE MNTR ADLT SET O2 INTMD

## (undated) DEVICE — YANKAUER,FLEXIBLE HANDLE,REGLR CAPACITY: Brand: MEDLINE INDUSTRIES, INC.

## (undated) DEVICE — MINOR GENERAL: Brand: MEDLINE INDUSTRIES, INC.

## (undated) DEVICE — SOLUTION IRRIG 1000ML 0.9% NACL USP BTL

## (undated) DEVICE — SUTURE PDS II 3-0 Z683G

## (undated) DEVICE — GAUZE SPONGES,12 PLY: Brand: CURITY

## (undated) DEVICE — DRAPE TAPE: Brand: CONVERTORS

## (undated) DEVICE — FLEXIBLE YANKAUER,MEDIUM TIP, NO VACUUM CONTROL: Brand: ARGYLE

## (undated) DEVICE — WECK HORIZON MED BLUE CLIP DISP

## (undated) DEVICE — CLIP SM INTNL HMCLP TNTLM ESCP

## (undated) DEVICE — ANTIBACTERIAL UNDYED BRAIDED (POLYGLACTIN 910), SYNTHETIC ABSORBABLE SUTURE: Brand: COATED VICRYL

## (undated) DEVICE — SOL  .9 1000ML BTL

## (undated) DEVICE — WECK HORIZON SMALL YELLOW CLIP DISP

## (undated) DEVICE — CONTAINER SPEC STR 4OZ GRY LID

## (undated) DEVICE — DRAPE SHEET LG

## (undated) DEVICE — SUTURE MONOCRYL 4-0 PS-2

## (undated) DEVICE — SUT MCRYL 4-0 18IN PS-2 ABSRB UD 19MM 3/8 CIR

## (undated) DEVICE — CONTAINER,SPECIMEN,OR STERILE,4OZ: Brand: MEDLINE

## (undated) DEVICE — Device: Brand: JELCO

## (undated) DEVICE — HEX-LOCKING BLADE ELECTRODE: Brand: EDGE

## (undated) DEVICE — CLIP MED INTNL HMCLP TNTLM

## (undated) DEVICE — NEEDLE 18G 1-1/2 BLUNT FILL

## (undated) DEVICE — 3M™ STERI-STRIP™ REINFORCED ADHESIVE SKIN CLOSURES, R1547, 1/2 IN X 4 IN (12 MM X 100 MM), 6 STRIPS/ENVELOPE: Brand: 3M™ STERI-STRIP™

## (undated) DEVICE — PACK,UNIVERSAL,SPLIT,II: Brand: MEDLINE

## (undated) DEVICE — BRA MASTECTOMY 2XL PNK ULT SFT PERF FAB STYL

## (undated) DEVICE — 6 ML SYRINGE LUER-LOCK TIP: Brand: MONOJECT

## (undated) DEVICE — STANDARD HYPODERMIC NEEDLE,POLYPROPYLENE HUB: Brand: MONOJECT

## (undated) DEVICE — SUTURE VICRYL 3-0 SH

## (undated) DEVICE — ADHESIVE MASTISOL 2/3CC VL

## (undated) DEVICE — GAMMEX® PI HYBRID SIZE 6.5, STERILE POWDER-FREE SURGICAL GLOVE, POLYISOPRENE AND NEOPRENE BLEND: Brand: GAMMEX

## (undated) DEVICE — VEST SRG 5 XL CUP R/L

## (undated) DEVICE — SUTURE SILK 2-0 FS

## (undated) DEVICE — KIT ENDO ORCAPOD 160/180/190

## (undated) DEVICE — DRAPE PACK CHEST & U BAR

## (undated) DEVICE — 35 ML SYRINGE REGULAR TIP: Brand: MONOJECT

## (undated) DEVICE — INSULATED BLADE ELECTRODE: Brand: EDGE

## (undated) DEVICE — CLIP SM INTNL HMCLP TI ESCP

## (undated) DEVICE — KIT CLEAN ENDOKIT 1.1OZ GOWNX2

## (undated) DEVICE — PAD,ABDOMINAL,8"X7.5",STERILE,LF,1/PK: Brand: MEDLINE

## (undated) DEVICE — FORCEP RADIAL JAW 4

## (undated) DEVICE — 12 ML SYRINGE LUER-LOCK TIP: Brand: MONOJECT

## (undated) DEVICE — ADHESIVE LIQ 2/3ML VI MASTISOL

## (undated) DEVICE — COVER PRB NEOGUARD 30X2.6CM US

## (undated) DEVICE — SUT PERMA- 2-0 18IN FS NABSRB BLK 26MM 3/8

## (undated) NOTE — MR AVS SNAPSHOT
[facility]  [address] [address] [phone]                    After Visit Summary   6/21/2017    Karen Brand    MRN: [id number]           Visit Information        Provider Department Dept Phone    6/21/2017  3: If you've recently had a stay at the Hospital you can access your discharge instructions in [employer] by going to Visits < Admission Summaries.  If you've been to the Emergency Department or your doctor's office, you can view your past visit information in My

## (undated) NOTE — LETTER
[address] [place] [address]  Authorization for Surgical Operation or Procedure    1.  I hereby authorize Dr. Regina Hooper , my physician and the assistant, to perform the following operation and/or procedure:  Magnetic Resonance Imaging [place] 5. I consent to the photographing of the operations or procedures to be performed for the purposes of advancing medicine, science, and/or education, provided my identity is not revealed.  If the procedure has been videotaped, the physician/surgeon will obta risks and benefits involved in the proposed treatment and any reasonable alternative to the proposed treatment. I have also explained the risks and benefits involved in the refusal of the proposed treatment and have answered the patient's questions.  If I h

## (undated) NOTE — LETTER
[facility]  [address] [facility] [address]  Authorization for Imaging Procedure  Date of Procedure:     I hereby authorize                                     , my physician and his/her assistants (if applicable), which may include medical students, residents, and/or fellows, to perform the following procedure and administer such anesthesia as may be determined necessary by my physician: MAGNETIC RESONANCE IMAGE GUIDED TWO SITE RIGHT BREAST BIOPSY WITH CLIP PLACEMENT on Chelsey Barroso.   2.  I recognize that during the procedure, unforeseen conditions may necessitate additional or different procedures than those listed above. I, therefore, further authorize and request that the above-named physician, assistants, or designees perform such procedures as are, in their judgment, necessary and desirable.    3.  My physician has discussed prior to my procedure the potential benefits, risks and side effects of this procedure; the likelihood of achieving goals; and potential problems that might occur during recuperation. They also discussed reasonable alternatives to the procedure, including risks, benefits, and side effects related to the alternatives and risks related to not receiving this procedure. I have had all my questions answered and I acknowledge that no guarantee has been made as to the result that may be obtained.    4.  Should the need arise during my procedure, which includes change of level of care prior to discharge, I also consent to the administration of blood and/or blood products. Further, I understand that despite careful testing and screening of blood or blood products by collecting agencies, I may still be subject to ill effects as a result of receiving a blood transfusion and/or blood products. The following are some, but not all, of the potential risks that can occur: fever and allergic reactions, hemolytic reactions, transmission of diseases such as  Hepatitis, AIDS and Cytomegalovirus (CMV) and fluid overload. In the event that I wish to have an autologous transfusion of my own blood, or a directed donor transfusion, I will discuss this with my physician.  Check only if Refusing Blood or Blood Products  I understand refusal of blood or blood products as deemed necessary by my physician may have serious consequences to my condition to include possible death. I hereby assume responsibility for my refusal and release the hospital, its personnel, and my physicians from any responsibility for the consequences of my refusal.   [  ] Patient Refuses Blood      5.  I authorize the use of any specimen, organs, tissues, body parts or foreign objects that may be removed from my body during the procedure for diagnosis, research or teaching purposes and their subsequent disposal by hospital authorities. I also authorize the release of specimen test results and/or written reports to my treating physician on the hospital medical staff or other referring or consulting physicians involved in my care, at the discretion of the Pathologist or my treating physician.    6.  I consent to the photographing or videotaping of the procedures to be performed, including appropriate portions of my body for medical, scientific, or educational purposes, provided my identity is not revealed by the pictures or by descriptive texts accompanying them. If the procedure has been photographed/videotaped, the physician will obtain the original picture, image, videotape or CD. The hospital will not be responsible for storage, release or maintenance of the picture, image, tape or CD.   7.  I consent to the presence of a  or observers in the operating room as deemed necessary by my physician or their designees.    8.  I recognize that in the event my procedure results in extended X-Ray/fluoroscopy time, I may develop a skin reaction.    9.  If I have a Do Not Attempt Resuscitation  (DNAR) order in place, that status will be suspended while in the operating room, procedural suite, and during the recovery period unless otherwise explicitly stated by me (or a person authorized to consent on my behalf). The performing physician or my attending physician will determine when the applicable recovery period ends for purposes of reinstating the DNAR order.  10.  I acknowledge that my physician has explained sedation/analgesia administration to me including the risk and benefits I consent to the administration of sedation/analgesia as may be necessary or desirable in the judgment of my physician.      I CERTIFY THAT I HAVE READ AND FULLY UNDERSTAND THE ABOVE CONSENT FOR THE PROCEDURE.     Signature of Patient: _____________________________________________________________  Responsible person in case of minor, unconscious: ____________________________________  Relationship to patient:  __________________________________________________________  Signature of Witness: _______________________________Date: _________Time: __________    Statement of Physician: My signature below affirms that prior to the time of the procedure, I have explained to the patient and/or her guardian, the risks and benefits involved in the proposed treatment and any reasonable alternative to the proposed treatment. I have also explained the risks and benefits involved in the refusal of the proposed treatment and have answered the patient's questions. If I have a significant financial interest in a co-management agreement or a significant financial interest in any product or implant, or other significant relationship used in the procedure/surgery, I have disclosed this and had a discussion with my patient.  Signature of Physician:   _________________________________Date:_____________Time:________    Patient Name: Chelsey Barroso : 1970  Printed: 2025   Medical Record #: [id number]

## (undated) NOTE — LETTER
[address], [place], [place]     AUTHORIZATION FOR SURGICAL OPERATION OR PROCEDURE    I hereby authorize                           , my Physician(s) and whomever may be designated as the doctor's Assistant, to perform the 4. I consent to the photographing of procedure(s) to be performed for the purposes of advancing medicine, science and/or education, provided my identity is not revealed.  If the procedure has been videotaped, the physician/surgeon will obtain the original v (Witness signature)                                                                                                  (Date)                                (Time)  STATEMENT OF PHYSICIAN My signature below affirms that prior to the time of the procedure;  I

## (undated) NOTE — Clinical Note
Thanks for the referral.Has binge eating disorder so I put her on VyvanseI also started her on hctz 12.5 mg for some edema and elevated  Blood pressure. Astrid Samuel

## (undated) NOTE — ED AVS SNAPSHOT
Ryan Wolf   MRN: [id number]    Department:  [facility] Emergency Department   Date of Visit:  1/17/2019           Disclosure     Insurance plans vary and the physician(s) referred by the ER may not be covered by your plan.  Please conta CARE PHYSICIAN AT ONCE OR RETURN IMMEDIATELY TO THE EMERGENCY DEPARTMENT. If you have been prescribed any medication(s), please fill your prescription right away and begin taking the medication(s) as directed.   If you believe that any of the medications

## (undated) NOTE — LETTER
2/10/2020              [address] [facility] [address]         Dear Gaviota Ponce,    [address] [facility] records indicate that the tests ordered for you by Pietro Villatoro PA-C  have not been done.   If you have, in fact, already completed the t

## (undated) NOTE — LETTER
Kavya Mera [address]  [facility] [address], [place]  [address]  INFORMED CONSENT FOR TRANSFUSION OF BLOOD OR BLOOD PRODUCTS  My physician has informed me of the nature, purpose, benefits and risks of transfusion for blood and blood components that ______________________________________________  (Signature of Patient)                                                            (Responsible party in case of Minor,

## (undated) NOTE — LETTER
[facility]  Administration of Anesthesia  1. Abhay Howell, or _________________________________ acting on her behalf, (Patient) (Dependent/Representative) request to receive anesthesia for my pending procedure/operation/treatment. A physician (anesthesiologist) alone or an anesthesiologist working with a nurse anesthetist may administer my anesthesia. 2. I understand that my anesthesiologist is not an employee or agent of the hospital, but is an independent medical practitioner who has been permitted to use its facilities for the care and treatment of his/her patients. 3. I acknowledge that a physician from [facility] or their designate(s), recommended anesthesia for me using her/his medical judgment. The type(s) of anesthesia I may receive include:                a) General Anesthesia, b) Spinal/Epidural Anesthesia, c) Regional Anesthesia or d) Monitored Anesthesia Care. 4. If my spinal, regional or monitored anesthesia care (local) is not satisfactory for my comfort, or if my medical condition requires, I consent to the administration of general anesthesia. 5. I am aware that the practice of anesthesiology is not an exact science and that some foreseeable risks or consequences may occur. Some common risks/consequences include sore throat and hoarseness, nausea and vomiting, muscle soreness, backache, damage to the mouth/teeth/vocal cords and eye injury. I understand that more rare but serious potential risks of anesthesia include blood pressure changes, drug reactions, cardiac arrest, brain damage, paralysis or death. These risks apply to whether I have general, spinal/epidural, regional or monitored anesthesia care. 6. OBSTETRIC PATIENTS: Specific risks/consequences of spinal/epidural anesthesia may include itching, low blood pressure, difficulty urinating, slowing of the baby's heart rate and headache.  Rare risks include infections, high spinal block, spinal bleeding, seizure, cardiac arrest and death. 7. AWARENESS: I understand that it is possible (but unlikely) to have explicit memory of events from the operating room while under general anesthesia. 8. ELECTROCONVULSIVE THERAPY PATIENTS: This consent serves for all treatments in a single course of therapy. 9. I understand that I must inform my anesthesiologist when I last ate and/or drank to minimize the risk of anesthesia. 10. If I am pregnant, or may pregnant, I understand that elective surgery should be postponed until after the baby is born. Anesthetics cross the placenta and may temporarily anesthetize the baby. Although fetal complications of anesthesia during pregnancy are rare, they may include birth defects, premature labor, brain damage and death. 11. I certify that I informed the anesthesiologist, to the best of my ability, about medication I take including blood thinners, anticoagulants, herbal remedies, narcotics and recreational drugs (e.g. cocaine, marijuana, PCP). Failure to inform my anesthesiologist about these medicines may increase my risk of anesthetic complications. The nature and purpose of my anesthetic management was explained to me. I had the opportunity to ask questions and the answers and information provided meet my satisfaction.   I retain the right to withdraw this consent at any time prior to the administration of said anesthetic.    ___________________________________________________           _____________________________________________________  Patient Signature                                                                                      Witness Signature                ___________________________________________________           _____________________________________________________  Date/Time                                                                                               Responsible person in case of minor/ unconscious pt /Relationship    My signature below affirms that prior to the time of the procedure, I have explained to the patient and/or his/her guardian, the risks and benefits of undergoing anesthesia, as well as any reasonable alternatives.     ___________________________________________________            _____________________________________________________  Physician Signature                            Date/Time  Patient Name: Sneha Carpenter     : 1970     Printed: 2022      Medical Record #: [id number]                              Page 1 of 1    ----------ANESTHESIA CONSENT----------

## (undated) NOTE — LETTER
[facility]  [address], [place], [place]    Authorization for Surgical Operation and Procedure                               I hereby authorize                                                   MD, my physician and his/her assistants (if applicable), which may include medical students, residents, and/or fellows, to perform the following surgical operation/ procedure and administer such anesthesia as may be determined necessary by my physician: Operation/Procedure name (s) Right breast wire bracketed  on Chelsey Racquel Dharmesh   2.   I recognize that during the surgical operation/procedure, unforeseen conditions may necessitate additional or different procedures than those listed above.  I, therefore, further authorize and request that the above-named surgeon, assistants, or designees perform such procedures as are, in their judgment, necessary and desirable.    3.   My surgeon/physician has discussed prior to my surgery the potential benefits, risks and side effects of this procedure; the likelihood of achieving goals; and potential problems that might occur during recuperation.  They also discussed reasonable alternatives to the procedure, including risks, benefits, and side effects related to the alternatives and risks related to not receiving this procedure.  I have had all my questions answered and I acknowledge that no guarantee has been made as to the result that may be obtained.    4.   Should the need arise during my operation/procedure, which includes change of level of care prior to discharge, I also consent to the administration of blood and/or blood products.  Further, I understand that despite careful testing and screening of blood or blood products by collecting agencies, I may still be subject to ill effects as a result of receiving a blood transfusion and/or blood products.  The following are some, but not all, of the potential risks that can occur: fever and allergic reactions,  hemolytic reactions, transmission of diseases such as Hepatitis, AIDS and Cytomegalovirus (CMV) and fluid overload.  In the event that I wish to have an autologous transfusion of my own blood, or a directed donor transfusion, I will discuss this with my physician.  Check only if Refusing Blood or Blood Products  I understand refusal of blood or blood products as deemed necessary by my physician may have serious consequences to my condition to include possible death. I hereby assume responsibility for my refusal and release the hospital, its personnel, and my physicians from any responsibility for the consequences of my refusal.    o  Refuse   5.   I authorize the use of any specimen, organs, tissues, body parts or foreign objects that may be removed from my body during the operation/procedure for diagnosis, research or teaching purposes and their subsequent disposal by hospital authorities.  I also authorize the release of specimen test results and/or written reports to my treating physician on the hospital medical staff or other referring or consulting physicians involved in my care, at the discretion of the Pathologist or my treating physician.    6.   I consent to the photographing or videotaping of the operations or procedures to be performed, including appropriate portions of my body for medical, scientific, or educational purposes, provided my identity is not revealed by the pictures or by descriptive texts accompanying them.  If the procedure has been photographed/videotaped, the surgeon will obtain the original picture, image, videotape or CD.  The hospital will not be responsible for storage, release or maintenance of the picture, image, tape or CD.    7.   I consent to the presence of a  or observers in the operating room as deemed necessary by my physician or their designees.    8.   I recognize that in the event my procedure results in extended X-Ray/fluoroscopy time, I may develop a  skin reaction.    9. If I have a Do Not Attempt Resuscitation (DNAR) order in place, that status will be suspended while in the operating room, procedural suite, and during the recovery period unless otherwise explicitly stated by me (or a person authorized to consent on my behalf). The surgeon or my attending physician will determine when the applicable recovery period ends for purposes of reinstating the DNAR order.  10. Patients having a sterilization procedure: I understand that if the procedure is successful the results will be permanent and it will therefore be impossible for me to inseminate, conceive, or bear children.  I also understand that the procedure is intended to result in sterility, although the result has not been guaranteed.   11. I acknowledge that my physician has explained sedation/analgesia administration to me including the risk and benefits I consent to the administration of sedation/analgesia as may be necessary or desirable in the judgment of my physician.    I CERTIFY THAT I HAVE READ AND FULLY UNDERSTAND THE ABOVE CONSENT TO OPERATION and/or OTHER PROCEDURE.     ____________________________________  _________________________________        ______________________________  Signature of Patient    Signature of Responsible Person                Printed Name of Responsible Person                                      ____________________________________  _____________________________                ________________________________  Signature of Witness        Date  Time         Relationship to Patient    STATEMENT OF PHYSICIAN My signature below affirms that prior to the time of the procedure; I have explained to the patient and/or his/her legal representative, the risks and benefits involved in the proposed treatment and any reasonable alternative to the proposed treatment. I have also explained the risks and benefits involved in refusal of the proposed treatment and alternatives to the  proposed treatment and have answered the patient's questions. If I have a significant financial interest in a co-management agreement or a significant financial interest in any product or implant, or other significant relationship used in this procedure/surgery, I have disclosed this and had a discussion with my patient.     _____________________________________________________              _____________________________  (Signature of Physician)                                                                                         (Date)                                   (Time)  Patient Name: Chelsey Clement Dharmesh      : 1970      Printed: 2025     Medical Record #: [id number]                                      Page 1 of 1

## (undated) NOTE — LETTER
[address] [place] [address] Rd, [place], [place]     AUTHORIZATION FOR SURGICAL OPERATION OR PROCEDURE    I hereby authorize Dr. Doug Cesar MD, my Physician(s) and whomever may be designated as the doctor's Assistant, to perform the f 4. I consent to the photographing of procedure(s) to be performed for the purposes of advancing medicine, science and/or education, provided my identity is not revealed.  If the procedure has been videotaped, the physician/surgeon will obtain the original v (Witness signature)                                                                                                  (Date)                                (Time)  STATEMENT OF PHYSICIAN My signature below affirms that prior to the time of the procedure;  I

## (undated) NOTE — LETTER
[facility]  [address], [place], [place]    Authorization for Surgical Operation and Procedure                               I hereby authorize Ailyn Williamson MD, my physician and his/her assistants (if applicable), which may include medical students, residents, and/or fellows, to perform the following surgical operation/ procedure and administer such anesthesia as may be determined necessary by my physician: Operation/Procedure name (s) Right breast excisional biopsy on Chelsey Barroso   2.   I recognize that during the surgical operation/procedure, unforeseen conditions may necessitate additional or different procedures than those listed above.  I, therefore, further authorize and request that the above-named surgeon, assistants, or designees perform such procedures as are, in their judgment, necessary and desirable.    3.   My surgeon/physician has discussed prior to my surgery the potential benefits, risks and side effects of this procedure; the likelihood of achieving goals; and potential problems that might occur during recuperation.  They also discussed reasonable alternatives to the procedure, including risks, benefits, and side effects related to the alternatives and risks related to not receiving this procedure.  I have had all my questions answered and I acknowledge that no guarantee has been made as to the result that may be obtained.    4.   Should the need arise during my operation/procedure, which includes change of level of care prior to discharge, I also consent to the administration of blood and/or blood products.  Further, I understand that despite careful testing and screening of blood or blood products by collecting agencies, I may still be subject to ill effects as a result of receiving a blood transfusion and/or blood products.  The following are some, but not all, of the potential risks that can occur: fever and allergic reactions, hemolytic reactions,  transmission of diseases such as Hepatitis, AIDS and Cytomegalovirus (CMV) and fluid overload.  In the event that I wish to have an autologous transfusion of my own blood, or a directed donor transfusion, I will discuss this with my physician.  Check only if Refusing Blood or Blood Products  I understand refusal of blood or blood products as deemed necessary by my physician may have serious consequences to my condition to include possible death. I hereby assume responsibility for my refusal and release the hospital, its personnel, and my physicians from any responsibility for the consequences of my refusal.    o  Refuse   5.   I authorize the use of any specimen, organs, tissues, body parts or foreign objects that may be removed from my body during the operation/procedure for diagnosis, research or teaching purposes and their subsequent disposal by hospital authorities.  I also authorize the release of specimen test results and/or written reports to my treating physician on the hospital medical staff or other referring or consulting physicians involved in my care, at the discretion of the Pathologist or my treating physician.    6.   I consent to the photographing or videotaping of the operations or procedures to be performed, including appropriate portions of my body for medical, scientific, or educational purposes, provided my identity is not revealed by the pictures or by descriptive texts accompanying them.  If the procedure has been photographed/videotaped, the surgeon will obtain the original picture, image, videotape or CD.  The hospital will not be responsible for storage, release or maintenance of the picture, image, tape or CD.    7.   I consent to the presence of a  or observers in the operating room as deemed necessary by my physician or their designees.    8.   I recognize that in the event my procedure results in extended X-Ray/fluoroscopy time, I may develop a skin reaction.    9. If  I have a Do Not Attempt Resuscitation (DNAR) order in place, that status will be suspended while in the operating room, procedural suite, and during the recovery period unless otherwise explicitly stated by me (or a person authorized to consent on my behalf). The surgeon or my attending physician will determine when the applicable recovery period ends for purposes of reinstating the DNAR order.  10. Patients having a sterilization procedure: I understand that if the procedure is successful the results will be permanent and it will therefore be impossible for me to inseminate, conceive, or bear children.  I also understand that the procedure is intended to result in sterility, although the result has not been guaranteed.   11. I acknowledge that my physician has explained sedation/analgesia administration to me including the risk and benefits I consent to the administration of sedation/analgesia as may be necessary or desirable in the judgment of my physician.    I CERTIFY THAT I HAVE READ AND FULLY UNDERSTAND THE ABOVE CONSENT TO OPERATION and/or OTHER PROCEDURE.     ____________________________________  _________________________________        ______________________________  Signature of Patient    Signature of Responsible Person                Printed Name of Responsible Person                                      ____________________________________  _____________________________                ________________________________  Signature of Witness        Date  Time         Relationship to Patient    STATEMENT OF PHYSICIAN My signature below affirms that prior to the time of the procedure; I have explained to the patient and/or his/her legal representative, the risks and benefits involved in the proposed treatment and any reasonable alternative to the proposed treatment. I have also explained the risks and benefits involved in refusal of the proposed treatment and alternatives to the proposed treatment and have  answered the patient's questions. If I have a significant financial interest in a co-management agreement or a significant financial interest in any product or implant, or other significant relationship used in this procedure/surgery, I have disclosed this and had a discussion with my patient.     _____________________________________________________              _____________________________  (Signature of Physician)                                                                                         (Date)                                   (Time)  Patient Name: Chelsey Clement Dharmesh      : 1970      Printed: 2025     Medical Record #: [id number]                                      Page 1 of 1

## (undated) NOTE — MR AVS SNAPSHOT
[facility] SPECIALTY [facility] [place] [address] [address] [address] [phone]               Thank you for choosing us for your health care visit with Tanisha Troncoso.  MD Ray.  We are glad to serve you and happy to provide you with this summary of yo Dizziness. Commonly known as:  ANTIVERT                   Today's Orders     ALT(SGPT) [E]    Complete by:  Jan 25, 2017 (Approximate)    Assoc Dx:   Well adult exam [Z00.00]           AST (SGOT) [E]    Complete by:  Jan 25, 2017 (Approximate)    Assoc Dx between 7:30am to 6pm and on Saturday between 8am and 1pm. Evening and weekend appointments for   your exam are available. Walk-in patients are welcome for most exams.      [facility] and [address]

## (undated) NOTE — LETTER
[address], [place]  Authorization for Invasive Procedures  1. I hereby authorize Dr. Damion Gu , my physician and whomever may be designated as the doctor's assistant, to perform the following operation and/or procedure:  Colonoscopy on Vesna Rosalia Meckel at [facility].    2. My physician has explained to me the nature and purpose of the operation or other procedure, possible alternative methods of treatment, the risks involved and the possibility of complications to me. I understand the probable consequences of declining the recommended procedure and the alternative methods of treatment. I acknowledge that no guarantee has been made as to the result that may be obtained. 3. I recognize that during the course of this operation or other procedure, unforeseen conditions may necessitate additional or different procedures than those listed above. I, therefore, further authorize and request that the above-named physician, his/her physician assistants, or designees perform such procedures as are, in his/her professional opinion, necessary and desirable. If I have a Do Not Attempt Resuscitation (DNAR) order in place, that status will be suspended while in the operating room, procedural suite, and during the recovery period unless otherwise explicitly stated by me (or a person authorized to consent on my behalf). The surgeon or my attending physician will determine when the applicable recovery period ends for purposes of reinstating the DNAR order. 4. Should the need arise during my operation or immediate post-operative period; I also consent to the administration of blood and/or blood products.  Further, I understand that despite careful testing and screening of blood and blood products, I may still be subject to ill effects as a result of recieving a blood transfusion an/or blood producst. The following are some, but not all, of the potential risks that can occur: fever and allergic reactions, hemolytic reactions, transmission of disease such as hepatitis, AIDS, cytomegalovirus (CMV), and flluid overload. In the event that I wish to have autologous transfusions of my own blood, or a directed donor transfusion, I will discuss this with my physician. 5. I consent to the photographing of the operations or procedures to be performed for the purposes of advancing medicine, science, and/or education, provided my identity is not revealed. If the procedure has been videotaped, the physician/surgeon will obtain the original videotape. The hospital will not be responsible for storage or maintenance of this tape. 6. I consent to the presence of a  or observer as deemed necessary by my physician or his designee. 7. Any tissues or organs removed in the operation or other procedure may be disposed of by and at the discretion of [facility].    8. I understand that the physician and his/her physician assistants may not be employees or agents of [facility], [facility], [facility], but are independent medical practitioners who have been permitted to use its facilities for the care and treatment of their patients. 9. Patients having a sterilization procedure: I understand that if the procedure is successful the results will be permanent and it will therefore be impossible for me to inseminate, conceive or bear children. I also understand that the procedure is intended to result in sterility, although the result has not been guaranteed. 10. I CERTIFY THAT I HAVE READ AND FULLY UNDERSTAND THE ABOVE CONSENT TO OPERATION and/or OTHER PROCEDURE. 11. I acknowledge that my physician has explained sedation/analgesia administration to me including the risks and benefits. I consent to the administration of sedation/analgesia as may be necessary or desirable in the judgment of my physician.      Signature of Patient:  ________________________________________________ Date: _________Time: _________    Responsible person in case of minor or unconscious: _____________________________Relationship: ____________     Witness Signature: ____________________________________________ Date: __________ Time: ___________    Statement of Physician  My signature below affirms that prior to the time of the procedure, I have explained to the patient and/or her legal representative, the risks and benefits involved in the proposed treatment and any reasonable alternative to the proposed treatment. I have also explained the risks and benefits involved in the refusal of the proposed treatment and have answered the patient's questions. If I have a significant financial interest in this procedure/surgery, I have disclosed this and had a discussion with my patient.     Signature of Physician:   ________________________________________Date: _________Time:_______ Patient Name: Esther Barton  : 1970   Printed: May 17, 2022    Medical Record #: [id number]

## (undated) NOTE — LETTER
[facility]  Administration of Anesthesia  1. Burton Vogt, or _________________________________ acting on her behalf, (Patient) (Dependent/Representative) request to receive anesthesia for my pending procedure/operation/treatment. infections, high spinal block, spinal bleeding, seizure, cardiac arrest and death. 7. AWARENESS: I understand that it is possible (but unlikely) to have explicit memory of events from the operating room while under general anesthesia.   8. ELECTROCONVULSIV unconscious pt /Relationship    My signature below affirms that prior to the time of the procedure, I have explained to the patient and/or his/her guardian, the risks and benefits of undergoing anesthesia, as well as any reasonable alternatives.     _______